# Patient Record
Sex: FEMALE | Race: WHITE | NOT HISPANIC OR LATINO | Employment: OTHER | URBAN - METROPOLITAN AREA
[De-identification: names, ages, dates, MRNs, and addresses within clinical notes are randomized per-mention and may not be internally consistent; named-entity substitution may affect disease eponyms.]

---

## 2017-04-10 ENCOUNTER — TRANSCRIBE ORDERS (OUTPATIENT)
Dept: MAMMOGRAPHY | Facility: HOSPITAL | Age: 67
End: 2017-04-10

## 2017-04-10 DIAGNOSIS — Z12.31 VISIT FOR SCREENING MAMMOGRAM: Primary | ICD-10-CM

## 2017-05-09 ENCOUNTER — HOSPITAL ENCOUNTER (OUTPATIENT)
Dept: MAMMOGRAPHY | Facility: HOSPITAL | Age: 67
Discharge: HOME | End: 2017-05-09
Admitting: PHYSICIAN ASSISTANT

## 2017-05-09 DIAGNOSIS — Z12.31 VISIT FOR SCREENING MAMMOGRAM: ICD-10-CM

## 2017-05-09 PROCEDURE — G0202 SCR MAMMO BI INCL CAD: HCPCS

## 2017-05-09 PROCEDURE — 77063 BREAST TOMOSYNTHESIS BI: CPT

## 2017-05-09 PROCEDURE — 77063 BREAST TOMOSYNTHESIS BI: CPT | Performed by: RADIOLOGY

## 2017-05-09 PROCEDURE — G0202 SCR MAMMO BI INCL CAD: HCPCS | Performed by: RADIOLOGY

## 2018-06-25 ENCOUNTER — TRANSCRIBE ORDERS (OUTPATIENT)
Dept: ADMINISTRATIVE | Facility: HOSPITAL | Age: 68
End: 2018-06-25

## 2018-06-25 DIAGNOSIS — Z12.31 VISIT FOR SCREENING MAMMOGRAM: Primary | ICD-10-CM

## 2018-08-30 ENCOUNTER — APPOINTMENT (OUTPATIENT)
Dept: MAMMOGRAPHY | Facility: HOSPITAL | Age: 68
End: 2018-08-30

## 2018-09-04 RX ORDER — CLONAZEPAM 1 MG/1
1 TABLET ORAL 2 TIMES DAILY PRN
COMMUNITY
End: 2022-04-12 | Stop reason: SDUPTHER

## 2018-09-04 RX ORDER — BUPROPION HYDROCHLORIDE 200 MG/1
200 TABLET, EXTENDED RELEASE ORAL 2 TIMES DAILY
COMMUNITY
End: 2022-06-27

## 2018-09-13 ENCOUNTER — OFFICE VISIT (OUTPATIENT)
Dept: BARIATRICS/WEIGHT MGMT | Facility: CLINIC | Age: 68
End: 2018-09-13

## 2018-09-13 VITALS
HEIGHT: 60 IN | RESPIRATION RATE: 18 BRPM | BODY MASS INDEX: 23.27 KG/M2 | SYSTOLIC BLOOD PRESSURE: 130 MMHG | HEART RATE: 67 BPM | OXYGEN SATURATION: 99 % | TEMPERATURE: 99.1 F | DIASTOLIC BLOOD PRESSURE: 88 MMHG | WEIGHT: 118.51 LBS

## 2018-09-13 DIAGNOSIS — R53.83 FATIGUE, UNSPECIFIED TYPE: ICD-10-CM

## 2018-09-13 DIAGNOSIS — E55.9 HYPOVITAMINOSIS D: ICD-10-CM

## 2018-09-13 DIAGNOSIS — K90.9 INTESTINAL MALABSORPTION, UNSPECIFIED TYPE: ICD-10-CM

## 2018-09-13 DIAGNOSIS — R11.0 NAUSEA: ICD-10-CM

## 2018-09-13 DIAGNOSIS — Z13.21 MALNUTRITION SCREEN: ICD-10-CM

## 2018-09-13 DIAGNOSIS — Z13.0 SCREENING, IRON DEFICIENCY ANEMIA: ICD-10-CM

## 2018-09-13 DIAGNOSIS — R10.84 GENERALIZED ABDOMINAL PAIN: ICD-10-CM

## 2018-09-13 DIAGNOSIS — Z98.84 STATUS POST BARIATRIC SURGERY: Primary | ICD-10-CM

## 2018-09-13 PROCEDURE — 99204 OFFICE O/P NEW MOD 45 MIN: CPT | Performed by: PHYSICIAN ASSISTANT

## 2018-09-13 RX ORDER — OMEPRAZOLE 40 MG/1
40 CAPSULE, DELAYED RELEASE ORAL DAILY
Qty: 30 CAPSULE | Refills: 0 | Status: SHIPPED | OUTPATIENT
Start: 2018-09-13 | End: 2018-10-10 | Stop reason: SDUPTHER

## 2018-09-13 RX ORDER — SUCRALFATE ORAL 1 G/10ML
1 SUSPENSION ORAL 4 TIMES DAILY
Qty: 1200 ML | Refills: 0 | Status: SHIPPED | OUTPATIENT
Start: 2018-09-13 | End: 2018-10-10 | Stop reason: SDUPTHER

## 2018-09-13 RX ORDER — GLUCOSAMINE/D3/BOSWELLIA SERRA 1500MG-400
TABLET ORAL
COMMUNITY
End: 2022-09-20

## 2018-09-13 RX ORDER — LANOLIN ALCOHOL/MO/W.PET/CERES
1000 CREAM (GRAM) TOPICAL DAILY
COMMUNITY
End: 2022-06-08 | Stop reason: SDUPTHER

## 2018-09-13 NOTE — PROGRESS NOTES
"[facility] Bariatric Surgery  [address]  [facility] [address] [phone]        Patient Name: Skye De Oliveira.  YOB: 1950      Date of Visit: 9/13/2018      CC: ADRIENNE s/p 4.5 years s/p RNY with abd pain/ nausea    HPI:  Patient is a 68yo pleasant female s/p laparoscopic gastric bypass with a 120 cm Minerva limb/ HHR by Dr. Shan Booker at [facility] in [facility] 1/22/14. S/p lap elicia/ VLAD for partial SBO by Dr. Booker 2/17/2014 presents to transfer care.  Patient brought her cousin with her today as support who is taking notes and making sure she asks all the right questions.     Since initial surgery, had intractable nausea/ vomiting x several months requiring  5-6 readmissions for elecrolyte replacement/ fluids/ antiemetics. Had multiple UGI swallow studies during this time period, no further procedures after the VLAD. Eventually was able to tolerate small amounts of food after several months, though has continued to have abdominal pain, nausea since POD#1. States the bypass was the \"worst decision she has ever made, but now she is just trying to deal with it\".  Persistent abdominal pain and inability to eat well has led to worsened depression. She has been successful in weight loss with presurgery weight 197.6lb, lowest weight 115lb, ,minimal fluctuation- weight today 118lb.  Has not had any recent imaging. Last EGD was prior to bypass.  She states HTN/ HLD completely resolved with weight loss s/p bypass.     Symptoms include nonradiating epigastric abdominal pain post prandial with all intake, daily. Every 3-4 days feels like she can't get food down/ feels like food is building up in chest that requires vomiting.   BM every 3-4 days, large amounts of diarrhea. Feels lethargic most of the time- no energy. Craves ice chips.  Denies reflux, paresthesias, vision change, memory issues, hair thinning. Though her cousin thinks her memory " is poor.       Diet-wise, not able to tolerate any sweets. Eats very limited portions and is hungry immediately after eating. Has extreme restrictions with dense meats, typically avoids this so she is able to eat more. Will go through a large bag of BBQ chips in 2 days between meals. Does not track intake or protein.  Does not supplement with protein. Drinking 64oz+ daily. Feels that she has to drink to get food to go down. Drinking mostly snapple diet tea (6 daily).     Breakfast: grits  Lunch: ham and cheese  Dinner: chili  Snacks: chips, popcorn    Taking Vitamins faithfully: biotin 69378, folic acid  800, B12 1000, probiotic, caltrate-D 600, K 595mcg, B complex, MVI.  Golfs regularly as hobby and exercise. Non smoker, not exposed to second hand smoke. Taking alkaselzer 4-6 a day, no other antacids. Gets steroid injection or PO steroids for costochondritis every 3 months. Takes tylenol arthritis daily. Avoids NSAIDS.     She feels her other medical issues are related to gastric bypass.  She struggles with costochondritis of ribs, what is described as SI joint/ pelvic pain, and arthritis of hands.  Recently told she has osteoporosis. Also been dealing with a lot of stress. Daughter is in an abusive relationship and she is no longer allowed to see her grandchildren after trying to help her get out of the relationship. Son is a drug addict.  Not in a happy marriage. Depressed and doesn't get out of the house much unless it is to golf or spend time with her cousin. Her cousin is prompting most of this information from patient.     Presugery weight 197.6lb. Lowest weight was 115lb.       Past Medical History:   Diagnosis Date   • Anxiety    • Chest pain     noted during the night, went to ED once. Relieved with alkaseltzer after 15 minutes   • Costochondral chest pain    • Depression    • Osteoporosis    • S/P bariatric surgery      Past Surgical History:   Procedure Laterality Date   • ABDOMINOPLASTY  2007   •  AUGMENTATION MAMMAPLASTY Bilateral 2008   • BREAST EXCISIONAL BIOPSY Bilateral [id number]   • CHOLECYSTECTOMY  2014   • COLONOSCOPY  2017   • ENDOSCOPY  2014    hiatal hernia, stomach polyps, ulcers   • GASTRIC BYPASS  2014    Dr. Jhony Matthews   • HAMMER TOE REPAIR     • HYSTERECTOMY  1975    AGE 25   • KNEE SURGERY  2010   • OOPHORECTOMY Right     10 YRS LATER AT AGE 35   • TONSILLECTOMY  1974         Current Outpatient Prescriptions:   •  Biotin 63429 MCG tablet, Take  by mouth., Disp: , Rfl:   •  buPROPion SR (WELLBUTRIN SR) 200 MG 12 hr tablet, Take 200 mg by mouth 2 (Two) Times a Day., Disp: , Rfl:   •  clonazePAM (KlonoPIN) 1 MG tablet, Take 1 mg by mouth 2 (Two) Times a Day As Needed for Seizures., Disp: , Rfl:   •  FOLIC ACID PO, Take  by mouth., Disp: , Rfl:   •  Potassium 95 MG tablet, Take  by mouth., Disp: , Rfl:   •  Probiotic Product (PROBIOTIC-10 PO), Take  by mouth., Disp: , Rfl:   •  vitamin B-12 (CYANOCOBALAMIN) 1000 MCG tablet, Take 1,000 mcg by mouth Daily., Disp: , Rfl:   •  omeprazole (priLOSEC) 40 MG capsule, Take 1 capsule by mouth Daily., Disp: 30 capsule, Rfl: 0  •  sucralfate (CARAFATE) 1 GM/10ML suspension, Take 10 mL by mouth 4 (Four) Times a Day for 30 days., Disp: 1200 mL, Rfl: 0    Allergies   Allergen Reactions   • Penicillins Hives     Tolerates cephalospins well       Family History   Problem Relation Age of Onset   • Lung cancer Mother    • Lung cancer Father    • Breast cancer Neg Hx    • Ovarian cancer Neg Hx      Social History     Social History   • Marital status:      Spouse name: N/A   • Number of children: N/A   • Years of education: N/A     Occupational History   • Not on file.     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 Lives with .        Review of Systems   Constitutional: Negative.    HENT: Negative.    Eyes: Negative.    Respiratory: Negative.    Cardiovascular: Positive for chest pain (occ CP at night, relieved with renaldo seltzer).   Gastrointestinal: Positive for abdominal pain, diarrhea, nausea and vomiting. Negative for blood in stool and constipation.   Genitourinary: Negative.    Musculoskeletal: Positive for arthralgias.   Skin: Negative.    Neurological: Negative.    Psychiatric/Behavioral: The patient is nervous/anxious.         Depression  insomnia       /88 (BP Location: Left arm, Patient Position: Sitting, Cuff Size: Large Adult)   Pulse 67   Temp 99.1 °F (37.3 °C) (Temporal Artery )   Resp 18   Ht 152.4 cm (60\")   Wt 53.8 kg (118 lb 8.2 oz)   SpO2 99%   BMI 23.14 kg/m²     Physical Exam   Constitutional: She is oriented to person, place, and time. She appears well-developed and well-nourished.   HENT:   Head: Normocephalic and atraumatic.   Mouth/Throat: Oropharynx is clear and moist.   Eyes: EOM are normal.   Neck: Normal range of motion. Neck supple. No thyromegaly present.   Cardiovascular: Normal rate, regular rhythm and normal heart sounds.    Pulmonary/Chest: Effort normal and breath sounds normal. No respiratory distress. She has no wheezes.   Abdominal: Soft. Bowel sounds are normal. She exhibits no distension. There is tenderness (epigastric TTP).   Musculoskeletal: Normal range of motion.   Neurological: She is alert and oriented to person, place, and time.   Skin: Skin is warm and dry.   Psychiatric: She has a normal mood and affect. Her behavior is normal. Judgment and thought content normal.   Vitals reviewed.        Assessment:   ADRIENNE s/p 4.5 years s/p RNY     ICD-10-CM ICD-9-CM   1. Status post bariatric surgery Z98.84 V45.86   2. Fatigue, unspecified type R53.83 780.79   3. Hypovitaminosis D E55.9 268.9   4. Screening, iron " deficiency anemia Z13.0 V78.0   5. Malnutrition screen Z13.21 V77.2   6. Intestinal malabsorption, unspecified type K90.9 579.9   7. Nausea R11.0 787.02   8. Generalized abdominal pain R10.84 789.07         Plan:  Will obtain UGI + SBFT, likely followed by EGD.  Advised start omeprazole 40mg daily and carafate slurry QID. Will obtain routine bariatric labs + h pylori. Handout given for medications to avoid such as ASA products (renaldo seltzer)/ NSAIDS, tramadol/ steroids/ tobacco.  Advised protein 70-100g with dietary handout. Advised starting full vitamin regimen with handout of recommended vitamins. Will further discuss plan with Dr. Jurado pending results. Please call or RTC with any concerns.     Addendum:  PPI was increased to BID, add zantac PRN. B1 injections x 3 weeks sent for memory issues.     UGI SBFT 9/19/18 at [facility] IMPRESSION:  Upper GI series:  1. Status post gastric bypass x5 years. There was no evidence of  extraluminal contrast. No postoperative strictures are seen.  2. Mild gastroesophageal reflux to the level of the thoracic inlet     Small bowel follow-through: Small bowel series appeared within normal  Limits.    Will proceed with EGD for further evaluation.        Aminta Mcclellan PA-C     Addendum:   UGI 9/19/18 at [facility] IMPRESSION:  Upper GI series:  1. Status post gastric bypass x5 years. There was no evidence of  extraluminal contrast. No postoperative strictures are seen.  2. Mild gastroesophageal reflux to the level of the thoracic inlet   Small bowel follow-through: Small bowel series appeared within normal  Limits.    EGD with Dr. Jurado 10/15/18- unremarkable. Couple small non obstructing sutures at GJ. No ulcers, esophagitis or visible HH. Small 3cm pouch measuring 37-40cm from incisors.     CT abd pelvis at [facility] 10/19/18 IMPRESSION:  Mild wall thickening seen diffusely throughout the right  colon. Findings suggesting possibly a mild colitis.

## 2018-09-18 LAB
25(OH)D3+25(OH)D2 SERPL-MCNC: 38.5 NG/ML (ref 30–100)
A-TOCOPHEROL VIT E SERPL-MCNC: 14.2 MG/L (ref 9–29)
ALBUMIN SERPL-MCNC: 4.4 G/DL (ref 3.6–4.8)
ALBUMIN/GLOB SERPL: 2.4 {RATIO} (ref 1.2–2.2)
ALP SERPL-CCNC: 99 IU/L (ref 39–117)
ALT SERPL-CCNC: 20 IU/L (ref 0–32)
AST SERPL-CCNC: 26 IU/L (ref 0–40)
BASOPHILS # BLD AUTO: 0 X10E3/UL (ref 0–0.2)
BASOPHILS NFR BLD AUTO: 0 %
BILIRUB SERPL-MCNC: 0.3 MG/DL (ref 0–1.2)
BUN SERPL-MCNC: 9 MG/DL (ref 8–27)
BUN/CREAT SERPL: 12 (ref 12–28)
CALCIUM SERPL-MCNC: 9.7 MG/DL (ref 8.7–10.3)
CHLORIDE SERPL-SCNC: 104 MMOL/L (ref 96–106)
CO2 SERPL-SCNC: 25 MMOL/L (ref 20–29)
CREAT SERPL-MCNC: 0.75 MG/DL (ref 0.57–1)
EOSINOPHIL # BLD AUTO: 0 X10E3/UL (ref 0–0.4)
EOSINOPHIL NFR BLD AUTO: 1 %
ERYTHROCYTE [DISTWIDTH] IN BLOOD BY AUTOMATED COUNT: 16 % (ref 12.3–15.4)
FOLATE SERPL-MCNC: 16.1 NG/ML
GAMMA TOCOPHEROL SERPL-MCNC: 1.5 MG/L (ref 0.5–4.9)
GLOBULIN SER CALC-MCNC: 1.8 G/DL (ref 1.5–4.5)
GLUCOSE SERPL-MCNC: 92 MG/DL (ref 65–99)
HCT VFR BLD AUTO: 39.4 % (ref 34–46.6)
HGB BLD-MCNC: 12.4 G/DL (ref 11.1–15.9)
IMM GRANULOCYTES # BLD: 0 X10E3/UL (ref 0–0.1)
IMM GRANULOCYTES NFR BLD: 0 %
IRON SERPL-MCNC: 62 UG/DL (ref 27–139)
LYMPHOCYTES # BLD AUTO: 1.8 X10E3/UL (ref 0.7–3.1)
LYMPHOCYTES NFR BLD AUTO: 37 %
Lab: NORMAL
MAGNESIUM SERPL-MCNC: 2.2 MG/DL (ref 1.6–2.3)
MCH RBC QN AUTO: 29.5 PG (ref 26.6–33)
MCHC RBC AUTO-ENTMCNC: 31.5 G/DL (ref 31.5–35.7)
MCV RBC AUTO: 94 FL (ref 79–97)
METHYLMALONATE SERPL-SCNC: 177 NMOL/L (ref 0–378)
MONOCYTES # BLD AUTO: 0.4 X10E3/UL (ref 0.1–0.9)
MONOCYTES NFR BLD AUTO: 8 %
NEUTROPHILS # BLD AUTO: 2.6 X10E3/UL (ref 1.4–7)
NEUTROPHILS NFR BLD AUTO: 54 %
PHOSPHATE SERPL-MCNC: 3.9 MG/DL (ref 2.5–4.5)
PLATELET # BLD AUTO: 284 X10E3/UL (ref 150–379)
POTASSIUM SERPL-SCNC: 5.8 MMOL/L (ref 3.5–5.2)
PREALB SERPL-MCNC: 22 MG/DL (ref 10–36)
PROT SERPL-MCNC: 6.2 G/DL (ref 6–8.5)
PTH-INTACT SERPL-MCNC: 21 PG/ML (ref 15–65)
RBC # BLD AUTO: 4.2 X10E6/UL (ref 3.77–5.28)
SODIUM SERPL-SCNC: 143 MMOL/L (ref 134–144)
TSH SERPL DL<=0.005 MIU/L-ACNC: 0.33 UIU/ML (ref 0.45–4.5)
UREA BREATH TEST QL: NEGATIVE
VIT A SERPL-MCNC: 39.3 UG/DL (ref 36.4–108)
VIT B1 BLD-SCNC: 131.8 NMOL/L (ref 66.5–200)
WBC # BLD AUTO: 4.9 X10E3/UL (ref 3.4–10.8)
ZINC SERPL-MCNC: 99 UG/DL (ref 56–134)

## 2018-09-19 ENCOUNTER — HOSPITAL ENCOUNTER (OUTPATIENT)
Dept: GENERAL RADIOLOGY | Facility: HOSPITAL | Age: 68
Discharge: HOME | End: 2018-09-19
Admitting: PHYSICIAN ASSISTANT

## 2018-09-19 DIAGNOSIS — R11.0 NAUSEA: ICD-10-CM

## 2018-09-19 DIAGNOSIS — R10.84 GENERALIZED ABDOMINAL PAIN: ICD-10-CM

## 2018-09-19 PROCEDURE — 74245: CPT

## 2018-09-19 PROCEDURE — A9270 NON-COVERED ITEM OR SERVICE: HCPCS | Performed by: PHYSICIAN ASSISTANT

## 2018-09-19 PROCEDURE — 63710000001 BARIUM SULFATE 96 % RECONSTITUTED SUSPENSION: Performed by: PHYSICIAN ASSISTANT

## 2018-09-19 RX ADMIN — BARIUM SULFATE 500 ML: 960 POWDER, FOR SUSPENSION ORAL at 13:00

## 2018-09-20 ENCOUNTER — TELEPHONE (OUTPATIENT)
Dept: BARIATRICS/WEIGHT MGMT | Facility: CLINIC | Age: 68
End: 2018-09-20

## 2018-09-20 DIAGNOSIS — R10.9 ABDOMINAL PAIN, UNSPECIFIED ABDOMINAL LOCATION: Primary | ICD-10-CM

## 2018-09-20 DIAGNOSIS — R13.10 DYSPHAGIA, UNSPECIFIED TYPE: ICD-10-CM

## 2018-09-20 NOTE — TELEPHONE ENCOUNTER
Notified pt that her vitamin levels looked ok, but her thyroid level was abnormal and her potassium was elevated and she needs to discuss this with her pcp.  I also let pt know that her UGI showed mild reflux otherwise ok.  I let pt know that Dr Jurado will provide more information when he reviews her records.  Told pt to increase her Omeprazole 40mg to bid and take zantac as needed instead of taking the renaldo seltzer and to continue taking the Carafate as prescribed.  Pt verbalized understanding.

## 2018-09-20 NOTE — TELEPHONE ENCOUNTER
Pt called in stating that she had her UGI done yesterday and done fine with that, but later in the day she started experiencing severe hear burn.  She stated she had to take a Shawna seltzer to help get rid of the heartburn, and she stated that she is not supposed to be taking this, but had no choice.  Pt stated he heart burn is getting worse, and not being able to really eat is getting worse and she was wondering if you have found out anything further to help her from her last appointment with you on 9/13/2018.  Please advise, thank you.

## 2018-09-24 RX ORDER — THIAMINE HYDROCHLORIDE 100 MG/ML
100 INJECTION, SOLUTION INTRAMUSCULAR; INTRAVENOUS DAILY
Qty: 21 ML | Refills: 0 | Status: SHIPPED | OUTPATIENT
Start: 2018-09-24 | End: 2022-06-08

## 2018-09-24 NOTE — TELEPHONE ENCOUNTER
Notified pt that Dr. Jurado has reviewed her UGI and wants to proceed with an EGD for further eval.  I let her know that Mauricio will be in contact with her to get her scheduled.  I also let pt know that we called in a 3 week rx for B1 injections into her pharmacy for her memory issues.  I sent in the rx.  Pt verbalized understanding.

## 2018-10-04 ENCOUNTER — HOSPITAL ENCOUNTER (OUTPATIENT)
Dept: MAMMOGRAPHY | Facility: HOSPITAL | Age: 68
Discharge: HOME | End: 2018-10-04
Admitting: PHYSICIAN ASSISTANT

## 2018-10-04 DIAGNOSIS — Z12.31 VISIT FOR SCREENING MAMMOGRAM: ICD-10-CM

## 2018-10-04 PROCEDURE — 77067 SCR MAMMO BI INCL CAD: CPT | Performed by: RADIOLOGY

## 2018-10-04 PROCEDURE — 77067 SCR MAMMO BI INCL CAD: CPT

## 2018-10-04 PROCEDURE — 77063 BREAST TOMOSYNTHESIS BI: CPT | Performed by: RADIOLOGY

## 2018-10-04 PROCEDURE — 77063 BREAST TOMOSYNTHESIS BI: CPT

## 2018-10-08 ENCOUNTER — TELEPHONE (OUTPATIENT)
Dept: BARIATRICS/WEIGHT MGMT | Facility: CLINIC | Age: 68
End: 2018-10-08

## 2018-10-08 NOTE — TELEPHONE ENCOUNTER
Pt called in requesting a refill on her carafate 1gm/10ml suspension and her Omeprazole 40mg to help her get through till she has her EGD when they can decide what they are going to do to help her with her nausea and vomiting. Pt saw Aminta.  Pt stated that the carafate and omeprazole has really helped her, and she is able to eat and drink now.

## 2018-10-10 RX ORDER — SUCRALFATE ORAL 1 G/10ML
1 SUSPENSION ORAL 4 TIMES DAILY
Qty: 3600 ML | Refills: 0 | Status: SHIPPED | OUTPATIENT
Start: 2018-10-10 | End: 2019-01-08

## 2018-10-10 RX ORDER — OMEPRAZOLE 40 MG/1
40 CAPSULE, DELAYED RELEASE ORAL 2 TIMES DAILY
Qty: 180 CAPSULE | Refills: 0 | Status: SHIPPED | OUTPATIENT
Start: 2018-10-10 | End: 2022-12-08

## 2018-10-10 NOTE — TELEPHONE ENCOUNTER
Notified pt that you are glad she is feeling better and to proceed with EGD as planned and her refills have been called into her pharmacy.  Pt verbalized understanding.

## 2018-10-11 ENCOUNTER — PREP FOR SURGERY (OUTPATIENT)
Dept: OTHER | Facility: HOSPITAL | Age: 68
End: 2018-10-11

## 2018-10-11 NOTE — H&P
"[facility] Bariatric Surgery  [address]  [facility] [address] [phone]           Patient Name: Skye De Oliveira.  YOB: 1950           CC: ADRIENNE s/p 4.5 years s/p RNY with abd pain/ nausea     HPI:  Patient is a 68yo pleasant female s/p laparoscopic gastric bypass with a 120 cm Minerva limb/ HHR by Dr. Shan Booker at [facility] in [facility] 1/22/14. S/p lap elicia/ VLAD for partial SBO by Dr. Booker 2/17/2014 presents to transfer care.  Patient brought her cousin with her today as support who is taking notes and making sure she asks all the right questions.      Since initial surgery, had intractable nausea/ vomiting x several months requiring  5-6 readmissions for elecrolyte replacement/ fluids/ antiemetics. Had multiple UGI swallow studies during this time period, no further procedures after the VLAD. Eventually was able to tolerate small amounts of food after several months, though has continued to have abdominal pain, nausea since POD#1. States the bypass was the \"worst decision she has ever made, but now she is just trying to deal with it\".  Persistent abdominal pain and inability to eat well has led to worsened depression. She has been successful in weight loss with presurgery weight 197.6lb, lowest weight 115lb, ,minimal fluctuation- weight today 118lb.  Has not had any recent imaging. Last EGD was prior to bypass.  She states HTN/ HLD completely resolved with weight loss s/p bypass.      Symptoms include nonradiating epigastric abdominal pain post prandial with all intake, daily. Every 3-4 days feels like she can't get food down/ feels like food is building up in chest that requires vomiting.   BM every 3-4 days, large amounts of diarrhea. Feels lethargic most of the time- no energy. Craves ice chips.  Denies reflux, paresthesias, vision change, memory issues, hair thinning. Though her cousin thinks her memory is poor.   "      Diet-wise, not able to tolerate any sweets. Eats very limited portions and is hungry immediately after eating. Has extreme restrictions with dense meats, typically avoids this so she is able to eat more. Will go through a large bag of BBQ chips in 2 days between meals. Does not track intake or protein.  Does not supplement with protein. Drinking 64oz+ daily. Feels that she has to drink to get food to go down. Drinking mostly snapple diet tea (6 daily).      Breakfast: grits  Lunch: ham and cheese  Dinner: chili  Snacks: chips, popcorn     Taking Vitamins faithfully: biotin 61311, folic acid  800, B12 1000, probiotic, caltrate-D 600, K 595mcg, B complex, MVI.  Golfs regularly as hobby and exercise. Non smoker, not exposed to second hand smoke. Taking alkaselzer 4-6 a day, no other antacids. Gets steroid injection or PO steroids for costochondritis every 3 months. Takes tylenol arthritis daily. Avoids NSAIDS.      She feels her other medical issues are related to gastric bypass.  She struggles with costochondritis of ribs, what is described as SI joint/ pelvic pain, and arthritis of hands.  Recently told she has osteoporosis. Also been dealing with a lot of stress. Daughter is in an abusive relationship and she is no longer allowed to see her grandchildren after trying to help her get out of the relationship. Son is a drug addict.  Not in a happy marriage. Depressed and doesn't get out of the house much unless it is to golf or spend time with her cousin. Her cousin is prompting most of this information from patient.      Presugery weight 197.6lb. Lowest weight was 115lb.        Past Medical History:   Diagnosis Date   • Anxiety    • Chest pain     noted during the night, went to ED once. Relieved with alkaseltzer after 15 minutes   • Costochondral chest pain    • Depression    • Osteoporosis    • S/P bariatric surgery      Past Surgical History:   Procedure Laterality Date   • ABDOMINOPLASTY  2007   •  AUGMENTATION MAMMAPLASTY Bilateral 2008   • BREAST EXCISIONAL BIOPSY Bilateral [id number]   • CHOLECYSTECTOMY  2014   • COLONOSCOPY  2017   • ENDOSCOPY  2014    hiatal hernia, stomach polyps, ulcers   • GASTRIC BYPASS  2014    Dr. Jhony Matthews   • HAMMER TOE REPAIR     • HYSTERECTOMY  1975    AGE 25   • KNEE SURGERY  2010   • OOPHORECTOMY Right     10 YRS LATER AT AGE 35   • TONSILLECTOMY  1974        Current Outpatient Prescriptions:   •  Biotin 97735 MCG tablet, Take  by mouth., Disp: , Rfl:   •  buPROPion SR (WELLBUTRIN SR) 200 MG 12 hr tablet, Take 200 mg by mouth 2 (Two) Times a Day., Disp: , Rfl:   •  clonazePAM (KlonoPIN) 1 MG tablet, Take 1 mg by mouth 2 (Two) Times a Day As Needed for Seizures., Disp: , Rfl:   •  FOLIC ACID PO, Take  by mouth., Disp: , Rfl:   •  Potassium 95 MG tablet, Take  by mouth., Disp: , Rfl:   •  Probiotic Product (PROBIOTIC-10 PO), Take  by mouth., Disp: , Rfl:   •  vitamin B-12 (CYANOCOBALAMIN) 1000 MCG tablet, Take 1,000 mcg by mouth Daily., Disp: , Rfl:   •  omeprazole (priLOSEC) 40 MG capsule, Take 1 capsule by mouth Daily., Disp: 30 capsule, Rfl: 0  •  sucralfate (CARAFATE) 1 GM/10ML suspension, Take 10 mL by mouth 4 (Four) Times a Day for 30 days., Disp: 1200 mL, Rfl: 0           Allergies   Allergen Reactions   • Penicillins Hives       Tolerates cephalospins well         Family History   Problem Relation Age of Onset   • Lung cancer Mother     • Lung cancer Father     • Breast cancer Neg Hx     • Ovarian cancer Neg Hx        Social History     Social History   • Marital status:      Spouse name: N/A   • Number of children: N/A   • Years of education: N/A     Occupational History   • Not on file.     Social History Main Topics   • Smoking status: Never Smoker   • Smokeless tobacco: Never Used   • Alcohol use No   • Drug use: No   • Sexual activity: Not on file      Comment: no hormones     Other Topics Concern   • Not on file     Social History  "Laurie    Works part time [place] [employer]- . Lives with .         Review of Systems   Constitutional: Negative.    HENT: Negative.    Eyes: Negative.    Respiratory: Negative.    Cardiovascular: Positive for chest pain (occ CP at night, relieved with renaldo seltzer).   Gastrointestinal: Positive for abdominal pain, diarrhea, nausea and vomiting. Negative for blood in stool and constipation.   Genitourinary: Negative.    Musculoskeletal: Positive for arthralgias.   Skin: Negative.    Neurological: Negative.    Psychiatric/Behavioral: The patient is nervous/anxious.         Depression, insomnia         /88 (BP Location: Left arm, Patient Position: Sitting, Cuff Size: Large Adult)   Pulse 67   Temp 99.1 °F (37.3 °C) (Temporal Artery )   Resp 18   Ht 152.4 cm (60\")   Wt 53.8 kg (118 lb 8.2 oz)   SpO2 99%   BMI 23.14 kg/m²      Physical Exam   Constitutional: She is oriented to person, place, and time. She appears well-developed and well-nourished.   HENT:   Head: Normocephalic and atraumatic.   Mouth/Throat: Oropharynx is clear and moist.   Eyes: EOM are normal.   Neck: Normal range of motion. Neck supple. No thyromegaly present.   Cardiovascular: Normal rate, regular rhythm and normal heart sounds.    Pulmonary/Chest: Effort normal and breath sounds normal. No respiratory distress. She has no wheezes.   Abdominal: Soft. Bowel sounds are normal. She exhibits no distension. There is tenderness (epigastric TTP).   Musculoskeletal: Normal range of motion.   Neurological: She is alert and oriented to person, place, and time.   Skin: Skin is warm and dry.   Psychiatric: She has a normal mood and affect. Her behavior is normal. Judgment and thought content normal.   Vitals reviewed.           Assessment:   ADRIENNE s/p 4.5 years s/p RNY       ICD-10-CM ICD-9-CM   1. Status post bariatric surgery Z98.84 V45.86   2. Fatigue, unspecified type R53.83 780.79   3. Hypovitaminosis D E55.9 " 268.9   4. Screening, iron deficiency anemia Z13.0 V78.0   5. Malnutrition screen Z13.21 V77.2   6. Intestinal malabsorption, unspecified type K90.9 579.9   7. Nausea R11.0 787.02   8. Generalized abdominal pain R10.84 789.07          Plan:  Will obtain UGI + SBFT, likely followed by EGD.  Advised start omeprazole 40mg daily and carafate slurry QID. Will obtain routine bariatric labs + h pylori. Handout given for medications to avoid such as ASA products (renaldo seltzer)/ NSAIDS, tramadol/ steroids/ tobacco.  Advised protein 70-100g with dietary handout. Advised starting full vitamin regimen with handout of recommended vitamins. Will further discuss plan with Dr. Jurado pending results. Please call or RTC with any concerns.      Addendum:  PPI was increased to BID, add zantac PRN. B1 injections x 3 weeks sent for memory issues.      UGI SBFT 9/19/18 at [facility] IMPRESSION:  1. Status post gastric bypass x5 years. There was no evidence of  extraluminal contrast. No postoperative strictures are seen.  2. Mild gastroesophageal reflux to the level of the thoracic inlet  3. Small bowel series appeared within normal limits.     Will proceed with EGD for further evaluation.

## 2018-10-15 ENCOUNTER — OUTSIDE FACILITY SERVICE (OUTPATIENT)
Dept: BARIATRICS/WEIGHT MGMT | Facility: CLINIC | Age: 68
End: 2018-10-15

## 2018-10-15 ENCOUNTER — LAB REQUISITION (OUTPATIENT)
Dept: LAB | Facility: HOSPITAL | Age: 68
End: 2018-10-15

## 2018-10-15 DIAGNOSIS — R10.9 ABDOMINAL PAIN: ICD-10-CM

## 2018-10-15 PROCEDURE — 43239 EGD BIOPSY SINGLE/MULTIPLE: CPT | Performed by: SURGERY

## 2018-10-15 PROCEDURE — 88305 TISSUE EXAM BY PATHOLOGIST: CPT | Performed by: SURGERY

## 2018-10-16 ENCOUNTER — TELEPHONE (OUTPATIENT)
Dept: BARIATRICS/WEIGHT MGMT | Facility: CLINIC | Age: 68
End: 2018-10-16

## 2018-10-16 DIAGNOSIS — R11.0 NAUSEA: ICD-10-CM

## 2018-10-16 DIAGNOSIS — R10.9 ABDOMINAL PAIN, UNSPECIFIED ABDOMINAL LOCATION: Primary | ICD-10-CM

## 2018-10-16 LAB
CYTO UR: NORMAL
LAB AP CASE REPORT: NORMAL
LAB AP CLINICAL INFORMATION: NORMAL
PATH REPORT.FINAL DX SPEC: NORMAL
PATH REPORT.GROSS SPEC: NORMAL

## 2018-10-16 NOTE — TELEPHONE ENCOUNTER
----- Message from Ton Jurado MD sent at 10/15/2018 10:39 AM EDT -----  EGD unrmk - order a CT scan abd/pelvis IV/po (barium based) contrast, ty

## 2018-10-16 NOTE — TELEPHONE ENCOUNTER
Notified pt that her EGD was reviewed and looked ok and Dr Jurado is wanting her to have a CT scan for further eval. Pt verbalized understanding.

## 2018-10-17 DIAGNOSIS — R10.9 ABDOMINAL PAIN, UNSPECIFIED ABDOMINAL LOCATION: ICD-10-CM

## 2018-10-17 DIAGNOSIS — R13.10 DYSPHAGIA, UNSPECIFIED TYPE: ICD-10-CM

## 2018-10-19 ENCOUNTER — HOSPITAL ENCOUNTER (OUTPATIENT)
Dept: CT IMAGING | Facility: HOSPITAL | Age: 68
Discharge: HOME | End: 2018-10-19
Admitting: PHYSICIAN ASSISTANT

## 2018-10-19 ENCOUNTER — APPOINTMENT (OUTPATIENT)
Dept: CT IMAGING | Facility: HOSPITAL | Age: 68
End: 2018-10-19

## 2018-10-19 DIAGNOSIS — R11.0 NAUSEA: ICD-10-CM

## 2018-10-19 DIAGNOSIS — R10.9 ABDOMINAL PAIN, UNSPECIFIED ABDOMINAL LOCATION: ICD-10-CM

## 2018-10-19 LAB — CREAT BLDA-MCNC: 0.8 MG/DL (ref 0.6–1.3)

## 2018-10-19 PROCEDURE — 82565 ASSAY OF CREATININE: CPT

## 2018-10-19 PROCEDURE — 74177 CT ABD & PELVIS W/CONTRAST: CPT

## 2018-10-19 PROCEDURE — A9270 NON-COVERED ITEM OR SERVICE: HCPCS | Performed by: SURGERY

## 2018-10-19 PROCEDURE — 63710000001 BARIUM 2 % SUSPENSION: Performed by: SURGERY

## 2018-10-19 PROCEDURE — 25010000002 IOPAMIDOL 61 % SOLUTION: Performed by: SURGERY

## 2018-10-19 RX ADMIN — IOPAMIDOL 95 ML: 612 INJECTION, SOLUTION INTRAVENOUS at 14:34

## 2018-10-19 RX ADMIN — BARIUM SULFATE 450 ML: 21 SUSPENSION ORAL at 13:25

## 2018-10-22 ENCOUNTER — TELEPHONE (OUTPATIENT)
Dept: BARIATRICS/WEIGHT MGMT | Facility: CLINIC | Age: 68
End: 2018-10-22

## 2018-10-22 NOTE — TELEPHONE ENCOUNTER
----- Message from Ton Jurado MD sent at 10/22/2018 10:03 AM EDT -----  Tell her all tests are normal.  The next step would be a diagnostic laparoscopy if she's interested.  No guarantee that it would alleviate her sx's.  Always welcome to get second opinion(s). Thanks,  ----- Message -----  From: Aminta Mcclellan PA-C  Sent: 10/22/2018   9:49 AM  To: Ton Jurado MD    Update- CT is resulted.   ADRIENNE- 4 years post bypass with nausea/ abdominal pain.  Let me know your thoughts, thanks!    UGI 9/19/18 at [facility] IMPRESSION:  Upper GI series:  1. Status post gastric bypass x5 years. There was no evidence of  extraluminal contrast. No postoperative strictures are seen.  2. Mild gastroesophageal reflux to the level of the thoracic inlet   Small bowel follow-through: Small bowel series appeared within normal  Limits.     EGD with Dr. Jurado 10/15/18- unremarkable. Couple small non obstructing sutures at GJ. No ulcers, esophagitis or visible HH. Small 3cm pouch measuring 37-40cm from incisors.      CT abd pelvis at [facility] 10/19/18 IMPRESSION:  Mild wall thickening seen diffusely throughout the right  colon. Findings suggesting possibly a mild colitis.    LOV- s/p laparoscopic gastric bypass with a 120 cm Minerva limb/ HHR by Dr. Shan Booker at [facility] in [facility] 1/22/14. S/p lap elicia/ VLAD for partial SBO by Dr. Booker 2/17/2014 presents to transfer care.  Patient brought her cousin with her today as support who is taking notes and making sure she asks all the right questions.      Since initial surgery, had intractable nausea/ vomiting x several months requiring  5-6 readmissions for elecrolyte replacement/ fluids/ antiemetics. Had multiple UGI swallow studies during this time period, no further procedures after the VLAD. Eventually was able to tolerate small amounts of food after several months, though has continued to have abdominal pain, nausea since POD#1. States the bypass was  "the \"worst decision she has ever made, but now she is just trying to deal with it\".  Persistent abdominal pain and inability to eat well has led to worsened depression. She has been successful in weight loss with presurgery weight 197.6lb, lowest weight 115lb, ,minimal fluctuation- weight today 118lb.  Has not had any recent imaging. Last EGD was prior to bypass.  She states HTN/ HLD completely resolved with weight loss s/p bypass.      Symptoms include nonradiating epigastric abdominal pain post prandial with all intake, daily. Every 3-4 days feels like she can't get food down/ feels like food is building up in chest that requires vomiting.   BM every 3-4 days, large amounts of diarrhea. Feels lethargic most of the time- no energy. Craves ice chips.  Denies reflux, paresthesias, vision change, memory issues, hair thinning. Though her cousin thinks her memory is poor.        Diet-wise, not able to tolerate any sweets. Eats very limited portions and is hungry immediately after eating. Has extreme restrictions with dense meats, typically avoids this so she is able to eat more. Will go through a large bag of BBQ chips in 2 days between meals. Does not track intake or protein.  Does not supplement with protein. Drinking 64oz+ daily. Feels that she has to drink to get food to go down. Drinking mostly snapple diet tea (6 daily).      Breakfast: grits  Lunch: ham and cheese  Dinner: chili  Snacks: chips, popcorn     Taking Vitamins faithfully: biotin 99122, folic acid  800, B12 1000, probiotic, caltrate-D 600, K 595mcg, B complex, MVI.  Golfs regularly as hobby and exercise. Non smoker, not exposed to second hand smoke. Taking alkaselzer 4-6 a day, no other antacids. Gets steroid injection or PO steroids for costochondritis every 3 months. Takes tylenol arthritis daily. Avoids NSAIDS.      She feels her other medical issues are related to gastric bypass.  She struggles with costochondritis of ribs, what is described as SI " joint/ pelvic pain, and arthritis of hands.  Recently told she has osteoporosis. Also been dealing with a lot of stress. Daughter is in an abusive relationship and she is no longer allowed to see her grandchildren after trying to help her get out of the relationship. Son is a drug addict.  Not in a happy marriage. Depressed and doesn't get out of the house much unless it is to golf or spend time with her cousin. Her cousin is prompting most of this information from patient.      Presugery weight 197.6lb. Lowest weight was 115lb.         ----- Message -----  From: Ton Jurado MD  Sent: 10/15/2018  10:39 AM  To: Aminta Mcclellan PA-C    EGD unrmk - order a CT scan abd/pelvis IV/po (barium based) contrast, ty

## 2018-10-22 NOTE — TELEPHONE ENCOUNTER
Notified pt that her CT scan showed mild wall thickening of her right colon suggesting possible colitis.  I let pt know that I will mail her the report so she can share the results with her pcp.  I let her know form a bariatric standpoint tests are normal.  I let pt know that the next step would be a diagnostic lap if interested.  The pt stated she was not interested in this at all.  I let her know that she needs to make a f/up appointment to discuss.  Pt is scheduling with Yecenia powell.

## 2018-10-23 ENCOUNTER — OFFICE VISIT (OUTPATIENT)
Dept: BARIATRICS/WEIGHT MGMT | Facility: CLINIC | Age: 68
End: 2018-10-23

## 2018-10-23 VITALS
HEART RATE: 73 BPM | OXYGEN SATURATION: 99 % | SYSTOLIC BLOOD PRESSURE: 122 MMHG | RESPIRATION RATE: 18 BRPM | WEIGHT: 118.5 LBS | HEIGHT: 60 IN | DIASTOLIC BLOOD PRESSURE: 70 MMHG | BODY MASS INDEX: 23.26 KG/M2 | TEMPERATURE: 97.8 F

## 2018-10-23 DIAGNOSIS — R10.13 DYSPEPSIA: Primary | ICD-10-CM

## 2018-10-23 PROCEDURE — 99214 OFFICE O/P EST MOD 30 MIN: CPT | Performed by: PHYSICIAN ASSISTANT

## 2019-04-09 RX ORDER — OMEPRAZOLE 40 MG/1
CAPSULE, DELAYED RELEASE ORAL
Qty: 180 CAPSULE | Refills: 0 | OUTPATIENT
Start: 2019-04-09

## 2020-01-09 ENCOUNTER — TRANSCRIBE ORDERS (OUTPATIENT)
Dept: ADMINISTRATIVE | Facility: HOSPITAL | Age: 70
End: 2020-01-09

## 2020-01-09 DIAGNOSIS — Z12.31 VISIT FOR SCREENING MAMMOGRAM: Primary | ICD-10-CM

## 2020-04-28 ENCOUNTER — APPOINTMENT (OUTPATIENT)
Dept: MAMMOGRAPHY | Facility: HOSPITAL | Age: 70
End: 2020-04-28

## 2020-06-06 ENCOUNTER — HOSPITAL ENCOUNTER (OUTPATIENT)
Dept: MAMMOGRAPHY | Facility: HOSPITAL | Age: 70
Discharge: HOME | End: 2020-06-06
Admitting: OBSTETRICS & GYNECOLOGY

## 2020-06-06 DIAGNOSIS — Z12.31 VISIT FOR SCREENING MAMMOGRAM: ICD-10-CM

## 2020-06-06 PROCEDURE — 77063 BREAST TOMOSYNTHESIS BI: CPT

## 2020-06-06 PROCEDURE — 77067 SCR MAMMO BI INCL CAD: CPT | Performed by: RADIOLOGY

## 2020-06-06 PROCEDURE — 77067 SCR MAMMO BI INCL CAD: CPT

## 2020-06-06 PROCEDURE — 77063 BREAST TOMOSYNTHESIS BI: CPT | Performed by: RADIOLOGY

## 2021-10-29 ENCOUNTER — TRANSCRIBE ORDERS (OUTPATIENT)
Dept: ADMINISTRATIVE | Facility: HOSPITAL | Age: 71
End: 2021-10-29

## 2021-10-29 DIAGNOSIS — Z12.31 VISIT FOR SCREENING MAMMOGRAM: Primary | ICD-10-CM

## 2021-12-04 ENCOUNTER — HOSPITAL ENCOUNTER (OUTPATIENT)
Dept: MAMMOGRAPHY | Facility: HOSPITAL | Age: 71
Discharge: HOME | End: 2021-12-04
Admitting: OBSTETRICS & GYNECOLOGY

## 2021-12-04 DIAGNOSIS — Z12.31 VISIT FOR SCREENING MAMMOGRAM: ICD-10-CM

## 2021-12-04 PROCEDURE — 77067 SCR MAMMO BI INCL CAD: CPT | Performed by: RADIOLOGY

## 2021-12-04 PROCEDURE — 77063 BREAST TOMOSYNTHESIS BI: CPT

## 2021-12-04 PROCEDURE — 77063 BREAST TOMOSYNTHESIS BI: CPT | Performed by: RADIOLOGY

## 2021-12-04 PROCEDURE — 77067 SCR MAMMO BI INCL CAD: CPT

## 2022-03-23 ENCOUNTER — TELEPHONE (OUTPATIENT)
Dept: FAMILY MEDICINE CLINIC | Facility: CLINIC | Age: 72
End: 2022-03-23

## 2022-03-23 NOTE — TELEPHONE ENCOUNTER
Patient is requesting medication refill for Klonopin 0.5mg to be sent to [facility] pharmacy. Patient states that she did her blood work on 3/18/22 and has a scheduled appointment with Dr. Garcia for a medication recheck on 4/12/22. She states that was the earliest appointment available. Patient will be out of town 3/31/22 - 4/9/22. Patient will run out at the end of the month.  She is requesting an earlier appointment or refill to be sent in before she leaves town.  Patient is available any time.  But on the 3/29 and 3/30 she can only do between 8am-11am. Patient would like a call back ph: [phone].

## 2022-03-24 NOTE — TELEPHONE ENCOUNTER
[facility] for her on 03/16/22 for 30 with 2 refills.  Pt informed and she will call us back if any trouble with the pharmacy.

## 2022-04-12 ENCOUNTER — OFFICE VISIT (OUTPATIENT)
Dept: FAMILY MEDICINE CLINIC | Facility: CLINIC | Age: 72
End: 2022-04-12

## 2022-04-12 VITALS
BODY MASS INDEX: 23.64 KG/M2 | DIASTOLIC BLOOD PRESSURE: 80 MMHG | SYSTOLIC BLOOD PRESSURE: 110 MMHG | TEMPERATURE: 98 F | OXYGEN SATURATION: 96 % | RESPIRATION RATE: 12 BRPM | HEART RATE: 76 BPM | WEIGHT: 120.4 LBS | HEIGHT: 60 IN

## 2022-04-12 DIAGNOSIS — E04.1 THYROID NODULE: ICD-10-CM

## 2022-04-12 DIAGNOSIS — E53.8 VITAMIN B12 DEFICIENCY: Primary | ICD-10-CM

## 2022-04-12 DIAGNOSIS — F51.01 PRIMARY INSOMNIA: ICD-10-CM

## 2022-04-12 DIAGNOSIS — F41.9 ANXIETY: ICD-10-CM

## 2022-04-12 PROBLEM — Z86.010 HISTORY OF ADENOMATOUS POLYP OF COLON: Status: ACTIVE | Noted: 2018-03-05

## 2022-04-12 PROBLEM — M15.9 PRIMARY OSTEOARTHRITIS INVOLVING MULTIPLE JOINTS: Status: ACTIVE | Noted: 2022-04-12

## 2022-04-12 PROBLEM — E55.9 VITAMIN D DEFICIENCY: Status: ACTIVE | Noted: 2021-06-28

## 2022-04-12 PROBLEM — M79.642 BILATERAL HAND PAIN: Status: ACTIVE | Noted: 2022-04-12

## 2022-04-12 PROBLEM — Z79.899 HIGH RISK MEDICATION USE: Status: ACTIVE | Noted: 2021-06-28

## 2022-04-12 PROBLEM — M81.0 SENILE OSTEOPOROSIS: Status: ACTIVE | Noted: 2022-04-12

## 2022-04-12 PROBLEM — K21.9 GASTROESOPHAGEAL REFLUX DISEASE WITHOUT ESOPHAGITIS: Status: ACTIVE | Noted: 2021-06-28

## 2022-04-12 PROBLEM — S52.501A CLOSED FRACTURE OF RIGHT DISTAL RADIUS: Status: ACTIVE | Noted: 2021-06-09

## 2022-04-12 PROBLEM — R29.6 RECURRENT FALLS: Status: ACTIVE | Noted: 2020-01-13

## 2022-04-12 PROBLEM — R41.3 MEMORY DEFICITS: Status: ACTIVE | Noted: 2020-01-13

## 2022-04-12 PROBLEM — M70.70 BURSITIS OF HIP: Status: ACTIVE | Noted: 2019-06-07

## 2022-04-12 PROBLEM — E87.6 HYPOKALEMIA: Status: ACTIVE | Noted: 2021-06-28

## 2022-04-12 PROBLEM — Z98.84 H/O GASTRIC BYPASS: Status: ACTIVE | Noted: 2017-07-13

## 2022-04-12 PROBLEM — R43.0 ANOSMIA: Status: ACTIVE | Noted: 2020-01-13

## 2022-04-12 PROBLEM — S76.019A TEAR OF GLUTEUS MEDIUS TENDON: Status: ACTIVE | Noted: 2020-03-03

## 2022-04-12 PROBLEM — F32.5 MAJOR DEPRESSION IN REMISSION: Status: ACTIVE | Noted: 2018-03-05

## 2022-04-12 PROBLEM — Z86.0101 HISTORY OF ADENOMATOUS POLYP OF COLON: Status: ACTIVE | Noted: 2018-03-05

## 2022-04-12 PROBLEM — M79.641 BILATERAL HAND PAIN: Status: ACTIVE | Noted: 2022-04-12

## 2022-04-12 PROBLEM — S92.101A: Status: ACTIVE | Noted: 2021-06-09

## 2022-04-12 PROBLEM — M15.0 PRIMARY OSTEOARTHRITIS INVOLVING MULTIPLE JOINTS: Status: ACTIVE | Noted: 2022-04-12

## 2022-04-12 PROBLEM — N18.2 CHRONIC KIDNEY DISEASE, STAGE 2 (MILD): Status: ACTIVE | Noted: 2018-03-05

## 2022-04-12 PROCEDURE — 99214 OFFICE O/P EST MOD 30 MIN: CPT | Performed by: FAMILY MEDICINE

## 2022-04-12 RX ORDER — DIPHENOXYLATE HYDROCHLORIDE AND ATROPINE SULFATE 2.5; .025 MG/1; MG/1
1 TABLET ORAL DAILY
COMMUNITY

## 2022-04-12 RX ORDER — RALOXIFENE HYDROCHLORIDE 60 MG/1
1 TABLET, FILM COATED ORAL DAILY
COMMUNITY
Start: 2021-11-12 | End: 2022-06-08

## 2022-04-12 RX ORDER — TOBRAMYCIN AND DEXAMETHASONE 3; 1 MG/ML; MG/ML
SUSPENSION/ DROPS OPHTHALMIC
COMMUNITY
Start: 2022-02-08 | End: 2022-06-08

## 2022-04-12 RX ORDER — CLONAZEPAM 0.5 MG/1
TABLET ORAL
COMMUNITY
Start: 2021-10-05 | End: 2022-04-12 | Stop reason: SDUPTHER

## 2022-04-12 RX ORDER — BENZONATATE 200 MG/1
CAPSULE ORAL
COMMUNITY
Start: 2022-01-19 | End: 2022-04-12

## 2022-04-12 RX ORDER — DENOSUMAB 60 MG/ML
1 INJECTION SUBCUTANEOUS
COMMUNITY
Start: 2021-11-09 | End: 2022-06-27

## 2022-04-12 RX ORDER — DEXTROMETHORPHAN HYDROBROMIDE AND PROMETHAZINE HYDROCHLORIDE 15; 6.25 MG/5ML; MG/5ML
SYRUP ORAL
COMMUNITY
Start: 2022-02-28 | End: 2022-06-08

## 2022-04-12 RX ORDER — TRAMADOL HYDROCHLORIDE 50 MG/1
TABLET ORAL
COMMUNITY
Start: 2022-03-16 | End: 2022-06-16 | Stop reason: SDUPTHER

## 2022-04-12 RX ORDER — BUPROPION HYDROCHLORIDE 150 MG/1
150 TABLET ORAL DAILY
COMMUNITY
End: 2022-04-12 | Stop reason: SDUPTHER

## 2022-04-12 RX ORDER — FERROUS SULFATE 325(65) MG
1 TABLET ORAL EVERY 12 HOURS
COMMUNITY
Start: 2021-10-05 | End: 2022-05-02

## 2022-04-12 RX ORDER — RALOXIFENE HYDROCHLORIDE 60 MG/1
TABLET, FILM COATED ORAL
COMMUNITY
Start: 2022-04-08 | End: 2022-04-12 | Stop reason: SDUPTHER

## 2022-04-12 NOTE — TELEPHONE ENCOUNTER
Rx Refill Note    Requested Prescriptions     Pending Prescriptions Disp Refills   • Cholecalciferol (vitamin D3) 125 MCG (5000 UT) tablet tablet [Pharmacy Med Name: VITAMIN D3 (CHOLECAL) 5000 IU TAB] 30 tablet      Sig: TAKE ONE TABLET BY MOUTH EVERY EVENING        Last office visit with prescribing clinician: 01/04/2022      Next office visit with prescribing clinician: 4/12/2022   Last labs: 01/04/2022  Last refill: 01/04/2022  Pharmacy [facility]

## 2022-04-12 NOTE — PROGRESS NOTES
Patient Name: Skye De Oliveira  : 1950   MRN: [id number]     Chief Complaint:    Chief Complaint   Patient presents with   • Anxiety     Pt here for medication refills       History of Present Illness: Skye De Oliveira is a 71 y.o. female who is here today for follow up on anxiety  HPI        Review of Systems:   Review of Systems   Constitutional: Negative.    HENT: Negative.    Eyes: Negative.    Respiratory: Negative.    Cardiovascular: Negative.    Gastrointestinal: Negative.    Neurological: Negative.         Past Medical History:   Past Medical History:   Diagnosis Date   • Adenomatous polyp of colon    • Age-related osteoporosis without current pathological fracture    • Anxiety    • Anxiety    • Anxiety    • Bowel obstruction (HCC)     W/ADHESIONS   • Chest pain     noted during the night, went to ED once. Relieved with alkaseltzer after 15 minutes   • Chronic kidney disease     STAGE 2   • Costochondral chest pain    • Depression    • DJD (degenerative joint disease)     MULTIPLE JOINTS   • GERD without esophagitis    • H/O mammogram 2016    [facility]   • High risk medication use    • Hypokalemia    • Major depression in remission (HCC)    • Osteoporosis    • Primary insomnia    • Primary insomnia    • Primary osteoarthritis involving multiple joints    • S/P bariatric surgery    • Senile osteoporosis    • Thyroid nodule     MULTIPLE   • Vitamin D deficiency        Past Surgical History:   Past Surgical History:   Procedure Laterality Date   • ABDOMINOPLASTY     • APPENDECTOMY     • AUGMENTATION MAMMAPLASTY Bilateral    • BLADDER REPAIR      [facility]   • BREAST EXCISIONAL BIOPSY Bilateral [id number]   • BREAST SURGERY      REMOVED IMPLANTS AND REMOVED MORE FIROIDS   • CATARACT EXTRACTION     • CHOLECYSTECTOMY     • COLONOSCOPY     • ENDOSCOPY  2014    hiatal hernia, stomach polyps, ulcers   • GASTRIC BYPASS      Dr. Jhony Matthews   • HAMMER TOE REPAIR      • HYSTERECTOMY  1975    AGE 25   • HYSTERECTOMY  1974    AND UILATERAL OOPHHORETOMY   • KNEE SURGERY  2010   • KNEE SURGERY Left    • MASTOPEXY Bilateral 1982    WITH IMPLANT RECONSTRUCTION   • OOPHORECTOMY Right     10 YRS LATER AT AGE 35   • OVARY SURGERY Right 1998    REMOVED   • SHOULDER SURGERY Bilateral     ROTATOR CUFF  RIGHT 2000 LEFT 2001   • STOMACH SURGERY      TUMMY TUCK   • TONSILLECTOMY  1974    ADENOIODECTOMY   • VOCAL CORD BIOPSY  1992    POLYPS REMOVED       Family History:   Family History   Problem Relation Age of Onset   • Lung cancer Mother    • Lung cancer Father 49   • Breast cancer Neg Hx    • Ovarian cancer Neg Hx    • Endometrial cancer Neg Hx        Social History:   Social History     Socioeconomic History   • Marital status:    Tobacco Use   • Smoking status: Never Smoker   • Smokeless tobacco: Never Used   Substance and Sexual Activity   • Alcohol use: No   • Drug use: No       Medications:     Current Outpatient Medications:   •  Biotin 62198 MCG tablet, Take  by mouth., Disp: , Rfl:   •  Cholecalciferol (vitamin D3) 125 MCG (5000 UT) tablet tablet, TAKE ONE TABLET BY MOUTH EVERY EVENING, Disp: 30 tablet, Rfl: 5  •  clonazePAM (KlonoPIN) 0.5 MG tablet, take 1 tablet by oral route  every NIGHT, Disp: , Rfl:   •  ferrous sulfate 325 (65 FE) MG tablet, Take 1 tablet by mouth Every 12 (Twelve) Hours., Disp: , Rfl:   •  FOLIC ACID PO, Take  by mouth., Disp: , Rfl:   •  multivitamin (THERAGRAN) tablet tablet, Take 1 tablet by mouth Daily., Disp: , Rfl:   •  omeprazole (priLOSEC) 40 MG capsule, Take 1 capsule by mouth 2 (Two) Times a Day., Disp: 180 capsule, Rfl: 0  •  Potassium 95 MG tablet, Take  by mouth., Disp: , Rfl:   •  Probiotic Product (PROBIOTIC-10 PO), Take  by mouth., Disp: , Rfl:   •  thiamine (B-1) 100 MG/ML injection, Inject 1 mL into the appropriate muscle as directed by prescriber Daily. Please provide pt needles and syringes x21 days.  Thank you., Disp: 21 mL, Rfl:  "0  •  traMADol (ULTRAM) 50 MG tablet, , Disp: , Rfl:   •  buPROPion SR (WELLBUTRIN SR) 200 MG 12 hr tablet, Take 200 mg by mouth 2 (Two) Times a Day., Disp: , Rfl:   •  clonazePAM (KlonoPIN) 1 MG tablet, Take 1 mg by mouth 2 (Two) Times a Day As Needed for Seizures., Disp: , Rfl:   •  denosumab (Prolia) 60 MG/ML solution prefilled syringe syringe, Inject 1 mL under the skin into the appropriate area as directed Every 6 (Six) Months., Disp: , Rfl:   •  promethazine-dextromethorphan (PROMETHAZINE-DM) 6.25-15 MG/5ML syrup, , Disp: , Rfl:   •  raloxifene (EVISTA) 60 MG tablet, Take 1 tablet by mouth Daily., Disp: , Rfl:   •  tobramycin-dexamethasone (TOBRADEX) 0.3-0.1 % ophthalmic suspension, , Disp: , Rfl:   •  vitamin B-12 (CYANOCOBALAMIN) 1000 MCG tablet, Take 1,000 mcg by mouth Daily., Disp: , Rfl:     Allergies:   Allergies   Allergen Reactions   • Penicillins Hives and Unknown - High Severity     Tolerates cephalospins well  Tolerates cephalospins well           Physical Exam:  Vital Signs:   Vitals:    04/12/22 0930   BP: 110/80   BP Location: Left arm   Patient Position: Sitting   Cuff Size: Adult   Pulse: 76   Resp: 12   Temp: 98 °F (36.7 °C)   SpO2: 96%   Weight: 54.6 kg (120 lb 6.4 oz)   Height: 152.4 cm (60\")   PainSc: 0-No pain     Body mass index is 23.51 kg/m².     Physical Exam  Vitals and nursing note reviewed.   Constitutional:       Appearance: Normal appearance. She is normal weight.   HENT:      Head: Normocephalic and atraumatic.      Right Ear: Tympanic membrane, ear canal and external ear normal.      Left Ear: Tympanic membrane, ear canal and external ear normal.      Nose: Nose normal.      Mouth/Throat:      Mouth: Mucous membranes are dry.      Pharynx: Oropharynx is clear.   Eyes:      Extraocular Movements: Extraocular movements intact.      Conjunctiva/sclera: Conjunctivae normal.      Pupils: Pupils are equal, round, and reactive to light.   Cardiovascular:      Rate and Rhythm: Normal " rate and regular rhythm.      Pulses: Normal pulses.      Heart sounds: Normal heart sounds.   Pulmonary:      Effort: Pulmonary effort is normal.      Breath sounds: Normal breath sounds.   Musculoskeletal:      Cervical back: Normal range of motion and neck supple.   Feet:      Comments:      Neurological:      Mental Status: She is alert.         Procedures      Assessment/Plan:   Diagnoses and all orders for this visit:    1. Vitamin B12 deficiency (Primary)  Assessment & Plan:  B12 is good.  We will continue to hold off on injections.  May restart in 6 months.    Orders:  -     Vitamin B12; Future  -     Comprehensive Metabolic Panel; Future  -     TSH Rfx On Abnormal To Free T4; Future  -     CBC & Differential; Future    2. Thyroid nodule  Assessment & Plan:  Last thyroid ultrasound was stable with no need for follow-up.    Orders:  -     Vitamin B12; Future  -     Comprehensive Metabolic Panel; Future  -     TSH Rfx On Abnormal To Free T4; Future  -     CBC & Differential; Future    3. Primary insomnia  -     Vitamin B12; Future  -     Comprehensive Metabolic Panel; Future  -     TSH Rfx On Abnormal To Free T4; Future  -     CBC & Differential; Future    4. Anxiety  Assessment & Plan:  Patient has done well on Klonopin.  We will continue for now.    Orders:  -     Vitamin B12; Future  -     Comprehensive Metabolic Panel; Future  -     TSH Rfx On Abnormal To Free T4; Future  -     CBC & Differential; Future           Follow Up:   No follow-ups on file.    Markus Garcia MD  [facility]

## 2022-04-12 NOTE — ASSESSMENT & PLAN NOTE
Patient has continued to have pain in her hands.  She had an x-ray in 2018 showing arthritis of both hands and an anti-CCP that was negative.  She may end up going to the [facility].

## 2022-04-14 ENCOUNTER — TELEPHONE (OUTPATIENT)
Dept: FAMILY MEDICINE CLINIC | Facility: CLINIC | Age: 72
End: 2022-04-14

## 2022-04-14 DIAGNOSIS — M79.642 BILATERAL HAND PAIN: Primary | ICD-10-CM

## 2022-04-14 DIAGNOSIS — M79.641 BILATERAL HAND PAIN: Primary | ICD-10-CM

## 2022-04-14 NOTE — TELEPHONE ENCOUNTER
Call pt and ask her if it is only her hands that hurt? How long and what has she taken to relieve pain?

## 2022-04-15 NOTE — TELEPHONE ENCOUNTER
Call pt and ask her if it is only her hands that hurt? How long and what has she taken to relieve pain?     Pt states yes only her hands, they have been really bad for 6 months, said you have tried meloxicam, celebrex, and now is on tramadol but it is not helping with the pain.

## 2022-05-02 RX ORDER — FERROUS SULFATE 325(65) MG
TABLET ORAL
Qty: 180 TABLET | Refills: 0 | Status: SHIPPED | OUTPATIENT
Start: 2022-05-02 | End: 2022-08-08

## 2022-05-02 NOTE — TELEPHONE ENCOUNTER
Rx Refill Note    Requested Prescriptions     Pending Prescriptions Disp Refills   • FeroSul 325 (65 Fe) MG tablet [Pharmacy Med Name: FEROSUL 325 MG TABLET] 180 tablet 0     Sig: TAKE ONE TABLET BY MOUTH TWICE A DAY        Last office visit with prescribing clinician: 4/12/2022      Next office visit with prescribing clinician: Visit date not found   Last labs:   Last refill: 01/04/22   Pharmacy (be sure to add in Epic). correct

## 2022-06-06 DIAGNOSIS — Z11.59 NEED FOR HEPATITIS C SCREENING TEST: Primary | ICD-10-CM

## 2022-06-07 ENCOUNTER — LAB (OUTPATIENT)
Dept: FAMILY MEDICINE CLINIC | Facility: CLINIC | Age: 72
End: 2022-06-07

## 2022-06-07 DIAGNOSIS — F41.9 ANXIETY: ICD-10-CM

## 2022-06-07 DIAGNOSIS — E04.1 THYROID NODULE: ICD-10-CM

## 2022-06-07 DIAGNOSIS — Z11.59 NEED FOR HEPATITIS C SCREENING TEST: ICD-10-CM

## 2022-06-07 DIAGNOSIS — F51.01 PRIMARY INSOMNIA: ICD-10-CM

## 2022-06-07 DIAGNOSIS — E53.8 VITAMIN B12 DEFICIENCY: ICD-10-CM

## 2022-06-07 PROCEDURE — 36415 COLL VENOUS BLD VENIPUNCTURE: CPT | Performed by: FAMILY MEDICINE

## 2022-06-08 ENCOUNTER — TELEPHONE (OUTPATIENT)
Dept: FAMILY MEDICINE CLINIC | Facility: CLINIC | Age: 72
End: 2022-06-08

## 2022-06-08 ENCOUNTER — OFFICE VISIT (OUTPATIENT)
Dept: FAMILY MEDICINE CLINIC | Facility: CLINIC | Age: 72
End: 2022-06-08

## 2022-06-08 VITALS
WEIGHT: 117.3 LBS | HEART RATE: 83 BPM | DIASTOLIC BLOOD PRESSURE: 74 MMHG | SYSTOLIC BLOOD PRESSURE: 118 MMHG | HEIGHT: 61 IN | OXYGEN SATURATION: 98 % | BODY MASS INDEX: 22.15 KG/M2

## 2022-06-08 DIAGNOSIS — R59.1 LYMPHADENOPATHY: Primary | ICD-10-CM

## 2022-06-08 PROBLEM — R07.9 CHEST PAIN: Status: ACTIVE | Noted: 2021-06-28

## 2022-06-08 PROBLEM — F32.A DEPRESSION: Status: ACTIVE | Noted: 2021-06-28

## 2022-06-08 PROBLEM — Z98.84 S/P BARIATRIC SURGERY: Status: ACTIVE | Noted: 2021-06-28

## 2022-06-08 PROBLEM — M81.0 OSTEOPOROSIS: Status: ACTIVE | Noted: 2021-06-28

## 2022-06-08 PROBLEM — R07.89 COSTOCHONDRAL CHEST PAIN: Status: ACTIVE | Noted: 2021-06-28

## 2022-06-08 PROBLEM — F41.9 ANXIETY: Status: ACTIVE | Noted: 2018-03-05

## 2022-06-08 LAB
ALBUMIN SERPL-MCNC: 4.3 G/DL (ref 3.8–4.8)
ALBUMIN/GLOB SERPL: 2.9 {RATIO} (ref 1.2–2.2)
ALP SERPL-CCNC: 97 IU/L (ref 44–121)
ALT SERPL-CCNC: 19 IU/L (ref 0–32)
AST SERPL-CCNC: 20 IU/L (ref 0–40)
BASOPHILS # BLD AUTO: 0 X10E3/UL (ref 0–0.2)
BASOPHILS NFR BLD AUTO: 0 %
BILIRUB SERPL-MCNC: 0.4 MG/DL (ref 0–1.2)
BUN SERPL-MCNC: 8 MG/DL (ref 8–27)
BUN/CREAT SERPL: 13 (ref 12–28)
CALCIUM SERPL-MCNC: 9.1 MG/DL (ref 8.7–10.3)
CHLORIDE SERPL-SCNC: 103 MMOL/L (ref 96–106)
CO2 SERPL-SCNC: 27 MMOL/L (ref 20–29)
CREAT SERPL-MCNC: 0.62 MG/DL (ref 0.57–1)
EGFRCR SERPLBLD CKD-EPI 2021: 96 ML/MIN/1.73
EOSINOPHIL # BLD AUTO: 0.1 X10E3/UL (ref 0–0.4)
EOSINOPHIL NFR BLD AUTO: 1 %
ERYTHROCYTE [DISTWIDTH] IN BLOOD BY AUTOMATED COUNT: 11.9 % (ref 11.7–15.4)
GLOBULIN SER CALC-MCNC: 1.5 G/DL (ref 1.5–4.5)
GLUCOSE SERPL-MCNC: 86 MG/DL (ref 65–99)
HCT VFR BLD AUTO: 42.5 % (ref 34–46.6)
HCV AB S/CO SERPL IA: 0.1 S/CO RATIO (ref 0–0.9)
HGB BLD-MCNC: 13.7 G/DL (ref 11.1–15.9)
IMM GRANULOCYTES # BLD AUTO: 0 X10E3/UL (ref 0–0.1)
IMM GRANULOCYTES NFR BLD AUTO: 0 %
LYMPHOCYTES # BLD AUTO: 1.7 X10E3/UL (ref 0.7–3.1)
LYMPHOCYTES NFR BLD AUTO: 34 %
MCH RBC QN AUTO: 31.3 PG (ref 26.6–33)
MCHC RBC AUTO-ENTMCNC: 32.2 G/DL (ref 31.5–35.7)
MCV RBC AUTO: 97 FL (ref 79–97)
MONOCYTES # BLD AUTO: 0.4 X10E3/UL (ref 0.1–0.9)
MONOCYTES NFR BLD AUTO: 7 %
NEUTROPHILS # BLD AUTO: 2.8 X10E3/UL (ref 1.4–7)
NEUTROPHILS NFR BLD AUTO: 58 %
PLATELET # BLD AUTO: 271 X10E3/UL (ref 150–450)
POTASSIUM SERPL-SCNC: 4.7 MMOL/L (ref 3.5–5.2)
PROT SERPL-MCNC: 5.8 G/DL (ref 6–8.5)
RBC # BLD AUTO: 4.38 X10E6/UL (ref 3.77–5.28)
SODIUM SERPL-SCNC: 143 MMOL/L (ref 134–144)
TSH SERPL DL<=0.005 MIU/L-ACNC: 1.63 UIU/ML (ref 0.45–4.5)
VIT B12 SERPL-MCNC: 308 PG/ML (ref 232–1245)
WBC # BLD AUTO: 4.9 X10E3/UL (ref 3.4–10.8)

## 2022-06-08 PROCEDURE — 99213 OFFICE O/P EST LOW 20 MIN: CPT | Performed by: FAMILY MEDICINE

## 2022-06-08 RX ORDER — OMEPRAZOLE 40 MG/1
1 CAPSULE, DELAYED RELEASE ORAL
COMMUNITY
Start: 2022-01-04 | End: 2022-06-08

## 2022-06-08 RX ORDER — TRAMADOL HYDROCHLORIDE 50 MG/1
TABLET ORAL
COMMUNITY
End: 2022-06-08

## 2022-06-08 RX ORDER — ACETAMINOPHEN 160 MG
TABLET,DISINTEGRATING ORAL EVERY 24 HOURS
COMMUNITY
End: 2022-06-08

## 2022-06-08 RX ORDER — RALOXIFENE HYDROCHLORIDE 60 MG/1
TABLET, FILM COATED ORAL EVERY 24 HOURS
COMMUNITY
End: 2022-06-08

## 2022-06-08 RX ORDER — CLONAZEPAM 0.5 MG/1
TABLET ORAL
COMMUNITY
End: 2022-06-16 | Stop reason: SDUPTHER

## 2022-06-08 RX ORDER — DIAZEPAM 5 MG/1
TABLET ORAL
COMMUNITY
Start: 2022-05-03 | End: 2022-06-08

## 2022-06-08 RX ORDER — DEXTROMETHORPHAN HYDROBROMIDE AND PROMETHAZINE HYDROCHLORIDE 15; 6.25 MG/5ML; MG/5ML
5 SYRUP ORAL EVERY 6 HOURS
COMMUNITY
Start: 2022-02-04 | End: 2022-06-08

## 2022-06-08 RX ORDER — OMEPRAZOLE 40 MG/1
CAPSULE, DELAYED RELEASE ORAL EVERY 24 HOURS
COMMUNITY
End: 2022-06-08

## 2022-06-08 RX ORDER — CLONAZEPAM 0.5 MG/1
TABLET ORAL
COMMUNITY
Start: 2022-05-29 | End: 2022-06-08

## 2022-06-08 NOTE — TELEPHONE ENCOUNTER
Patient called to check on the status of her rheumatology appt.  She has been waiting since April.

## 2022-06-09 LAB
CRP SERPL-MCNC: <1 MG/L (ref 0–10)
ERYTHROCYTE [SEDIMENTATION RATE] IN BLOOD BY WESTERGREN METHOD: 2 MM/HR (ref 0–40)

## 2022-06-10 ENCOUNTER — TELEPHONE (OUTPATIENT)
Dept: FAMILY MEDICINE CLINIC | Facility: CLINIC | Age: 72
End: 2022-06-10

## 2022-06-10 NOTE — TELEPHONE ENCOUNTER
Patient called for an update on her referral for an ultrasound for her lymph nodes.  She was informed that it is still pending.  She is concerned because her lymph nodes are swollen and because of the blood work she had done.  She does not want to wait too long for an appointment. She wanted a message sent to Dr. Auguste. Ph: [phone]

## 2022-06-10 NOTE — TELEPHONE ENCOUNTER
Can somebody check on the status of her ultrasound and see when we are able to get this scheduled for her

## 2022-06-16 ENCOUNTER — OFFICE VISIT (OUTPATIENT)
Dept: FAMILY MEDICINE CLINIC | Facility: CLINIC | Age: 72
End: 2022-06-16

## 2022-06-16 ENCOUNTER — TELEPHONE (OUTPATIENT)
Dept: FAMILY MEDICINE CLINIC | Facility: CLINIC | Age: 72
End: 2022-06-16

## 2022-06-16 VITALS
WEIGHT: 115.5 LBS | SYSTOLIC BLOOD PRESSURE: 110 MMHG | DIASTOLIC BLOOD PRESSURE: 60 MMHG | BODY MASS INDEX: 21.81 KG/M2 | HEIGHT: 61 IN | HEART RATE: 73 BPM | RESPIRATION RATE: 12 BRPM | TEMPERATURE: 97.7 F | OXYGEN SATURATION: 99 %

## 2022-06-16 DIAGNOSIS — N18.31 CHRONIC KIDNEY DISEASE, STAGE 3A: ICD-10-CM

## 2022-06-16 DIAGNOSIS — F41.9 ANXIETY: ICD-10-CM

## 2022-06-16 DIAGNOSIS — R10.11 RIGHT UPPER QUADRANT ABDOMINAL PAIN: Primary | ICD-10-CM

## 2022-06-16 DIAGNOSIS — Z79.899 ENCOUNTER FOR LONG-TERM (CURRENT) USE OF OTHER MEDICATIONS: ICD-10-CM

## 2022-06-16 LAB
POC AMPHETAMINES: NEGATIVE
POC BARBITURATES: NEGATIVE
POC BENZODIAZEPHINES: POSITIVE
POC COCAINE: NEGATIVE
POC METHADONE: NEGATIVE
POC METHAMPHETAMINE SCREEN URINE: NEGATIVE
POC OPIATES: NEGATIVE
POC OXYCODONE: NEGATIVE
POC PHENCYCLIDINE: NEGATIVE
POC PROPOXYPHENE: NEGATIVE
POC THC: NEGATIVE
POC TRICYCLIC ANTIDEPRESSANTS: NEGATIVE

## 2022-06-16 PROCEDURE — 99214 OFFICE O/P EST MOD 30 MIN: CPT | Performed by: FAMILY MEDICINE

## 2022-06-16 PROCEDURE — 80305 DRUG TEST PRSMV DIR OPT OBS: CPT | Performed by: FAMILY MEDICINE

## 2022-06-16 RX ORDER — TRAMADOL HYDROCHLORIDE 50 MG/1
50 TABLET ORAL DAILY
Qty: 30 TABLET | Refills: 2 | Status: SHIPPED | OUTPATIENT
Start: 2022-06-16

## 2022-06-16 RX ORDER — CLONAZEPAM 0.5 MG/1
0.5 TABLET ORAL NIGHTLY PRN
Qty: 30 TABLET | Refills: 2 | Status: SHIPPED | OUTPATIENT
Start: 2022-06-16 | End: 2022-09-14 | Stop reason: SDUPTHER

## 2022-06-16 NOTE — TELEPHONE ENCOUNTER
THIS REQUEST WAS FA[facility]ED TO [facility] ON 4/29/22. I SPOKE TO DION TODAY, SHE DID NOT HAVE IT EVEN THOUGH IT WAS FAXED TO THE CORRECT NUMBER. I CONFIRMED THE FAX AND REFAXED IT, AND ASKED HER TO LOOK FOR IT TO GET PATIENT SCHEDULED.

## 2022-06-16 NOTE — PROGRESS NOTES
Patient Name: Skye De Oliveira  : 1950   MRN: [id number]     Chief Complaint:    Chief Complaint   Patient presents with   • lab results     Pt here for lab results   • Anxiety   • lump under arm pit     Pt has a lump under her left arm pit that is very sore x 2 weeks, has appt for U/S today at 2pm [facility]       History of Present Illness: Skye De Oliveira is a 71 y.o. female who is here today for follow up on abdominal pain  HPI        Review of Systems:   Review of Systems   Constitutional: Negative.    HENT: Negative.    Eyes: Negative.    Respiratory: Negative.    Cardiovascular: Negative.    Gastrointestinal: Positive for abdominal distention and abdominal pain.   Neurological: Negative.         Past Medical History:   Past Medical History:   Diagnosis Date   • Adenomatous polyp of colon    • Age-related osteoporosis without current pathological fracture    • Anxiety    • Anxiety    • Anxiety    • Bowel obstruction (HCC)     W/ADHESIONS   • Chest pain     noted during the night, went to ED once. Relieved with alkaseltzer after 15 minutes   • Chronic kidney disease     STAGE 2   • Costochondral chest pain    • Depression    • DJD (degenerative joint disease)     MULTIPLE JOINTS   • GERD without esophagitis    • H/O mammogram 2016    [facility]   • High risk medication use    • Hypokalemia    • Major depression in remission (HCC)    • Osteoporosis    • Primary insomnia    • Primary insomnia    • Primary osteoarthritis involving multiple joints    • S/P bariatric surgery    • Senile osteoporosis    • Thyroid nodule     MULTIPLE   • Vitamin D deficiency        Past Surgical History:   Past Surgical History:   Procedure Laterality Date   • ABDOMINOPLASTY     • APPENDECTOMY     • AUGMENTATION MAMMAPLASTY Bilateral    • BLADDER REPAIR      [facility]   • BREAST EXCISIONAL BIOPSY Bilateral [id number]   • BREAST SURGERY      REMOVED IMPLANTS AND REMOVED MORE FIROIDS   • CATARACT EXTRACTION     •  CHOLECYSTECTOMY  2014   • COLONOSCOPY  2017   • ENDOSCOPY  2014    hiatal hernia, stomach polyps, ulcers   • GASTRIC BYPASS  2014    Dr. Jhony Matthews   • HAMMER TOE REPAIR     • HYSTERECTOMY  1975    AGE 25   • HYSTERECTOMY  1974    AND UILATERAL OOPHHORETOMY   • KNEE SURGERY  2010   • KNEE SURGERY Left    • MASTOPEXY Bilateral 1982    WITH IMPLANT RECONSTRUCTION   • OOPHORECTOMY Right     10 YRS LATER AT AGE 35   • OVARY SURGERY Right 1998    REMOVED   • SHOULDER SURGERY Bilateral     ROTATOR CUFF  RIGHT 2000 LEFT 2001   • STOMACH SURGERY      TUMMY TUCK   • TONSILLECTOMY  1974    ADENOIODECTOMY   • VOCAL CORD BIOPSY  1992    POLYPS REMOVED       Family History:   Family History   Problem Relation Age of Onset   • Lung cancer Mother    • Lung cancer Father 49   • Breast cancer Neg Hx    • Ovarian cancer Neg Hx    • Endometrial cancer Neg Hx        Social History:   Social History     Socioeconomic History   • Marital status:    Tobacco Use   • Smoking status: Never Smoker   • Smokeless tobacco: Never Used   Substance and Sexual Activity   • Alcohol use: No   • Drug use: No   • Sexual activity: Not Currently     Partners: Male     Comment: no hormones       Medications:     Current Outpatient Medications:   •  Biotin 30061 MCG tablet, Take  by mouth., Disp: , Rfl:   •  buPROPion SR (WELLBUTRIN SR) 200 MG 12 hr tablet, Take 200 mg by mouth 2 (Two) Times a Day., Disp: , Rfl:   •  Cholecalciferol (vitamin D3) 125 MCG (5000 UT) tablet tablet, TAKE ONE TABLET BY MOUTH EVERY EVENING, Disp: 30 tablet, Rfl: 5  •  clonazePAM (KlonoPIN) 0.5 MG tablet, 1 tab(s), Disp: , Rfl:   •  denosumab (Prolia) 60 MG/ML solution prefilled syringe syringe, Inject 1 mL under the skin into the appropriate area as directed Every 6 (Six) Months., Disp: , Rfl:   •  FeroSul 325 (65 Fe) MG tablet, TAKE ONE TABLET BY MOUTH TWICE A DAY, Disp: 180 tablet, Rfl: 0  •  FOLIC ACID PO, Take  by mouth., Disp: , Rfl:   •   "loratadine-pseudoephedrine (CLARITIN-D 12-hour) 5-120 MG per 12 hr tablet, Every 12 (Twelve) Hours., Disp: , Rfl:   •  multivitamin (THERAGRAN) tablet tablet, Take 1 tablet by mouth Daily., Disp: , Rfl:   •  omeprazole (priLOSEC) 40 MG capsule, Take 1 capsule by mouth 2 (Two) Times a Day., Disp: 180 capsule, Rfl: 0  •  Potassium 95 MG tablet, Take  by mouth., Disp: , Rfl:   •  Probiotic Product (PROBIOTIC-10 PO), Take  by mouth., Disp: , Rfl:   •  traMADol (ULTRAM) 50 MG tablet, , Disp: , Rfl:     Allergies:   Allergies   Allergen Reactions   • Penicillins Hives and Unknown - High Severity     Tolerates cephalospins well  Tolerates cephalospins well     • Penicillin V Rash         Physical Exam:  Vital Signs:   Vitals:    06/16/22 0830   BP: 110/60   BP Location: Left arm   Patient Position: Sitting   Cuff Size: Adult   Pulse: 73   Resp: 12   Temp: 97.7 °F (36.5 °C)   TempSrc: Temporal   SpO2: 99%   Weight: 52.4 kg (115 lb 8 oz)   Height: 154.9 cm (61\")   PainSc: 0-No pain     Body mass index is 21.82 kg/m².     Physical Exam  Vitals and nursing note reviewed.   Constitutional:       Appearance: Normal appearance. She is normal weight.   HENT:      Head: Normocephalic and atraumatic.      Right Ear: Tympanic membrane, ear canal and external ear normal.      Left Ear: Tympanic membrane, ear canal and external ear normal.      Nose: Nose normal.      Mouth/Throat:      Mouth: Mucous membranes are dry.      Pharynx: Oropharynx is clear.   Eyes:      Extraocular Movements: Extraocular movements intact.      Conjunctiva/sclera: Conjunctivae normal.      Pupils: Pupils are equal, round, and reactive to light.   Cardiovascular:      Rate and Rhythm: Normal rate and regular rhythm.      Pulses: Normal pulses.      Heart sounds: Normal heart sounds.   Pulmonary:      Effort: Pulmonary effort is normal.      Breath sounds: Normal breath sounds.   Abdominal:      Comments: Midepigastric abdominal tenderness.   Musculoskeletal: "      Cervical back: Normal range of motion and neck supple.   Feet:      Comments:      Neurological:      Mental Status: She is alert.         Procedures      Assessment/Plan:   Diagnoses and all orders for this visit:    1. Right upper quadrant abdominal pain (Primary)  Assessment & Plan:  Patient is tender in her midepigastrium.  She had very severe abdominal pain last week.  It woke her from sleep.  She did not have any associated symptoms with it.  She has never had this pain before.  Since she is really tender in the midepigastrium I feel that a CT would be warranted.  Her CBC and CMP were normal.  Recheck in 1 week.    Orders:  -     CT Abdomen With & Without Contrast; Future    2. Anxiety  Assessment & Plan:  Refill Klonopin.  We will follow.    Orders:  -     CT Abdomen With & Without Contrast; Future    3. Chronic kidney disease, stage 3a (HCC)  -     CT Abdomen With & Without Contrast; Future           Follow Up:   Return in about 1 week (around 6/23/2022).    Markus Garcia MD  [facility] [place] [facility]     Answers for HPI/ROS submitted by the patient on 6/9/2022  Please describe your symptoms.: Prescription refill  Have you had these symptoms before?: No  How long have you been having these symptoms?: 1-4 days  Please list any medications you are currently taking for this condition.: Already on file  What is the primary reason for your visit?: Other

## 2022-06-16 NOTE — ASSESSMENT & PLAN NOTE
Patient is tender in her midepigastrium.  She had very severe abdominal pain last week.  It woke her from sleep.  She did not have any associated symptoms with it.  She has never had this pain before.  Since she is really tender in the midepigastrium I feel that a CT would be warranted.  Her CBC and CMP were normal.  Recheck in 1 week.

## 2022-06-17 ENCOUNTER — TELEPHONE (OUTPATIENT)
Dept: FAMILY MEDICINE CLINIC | Facility: CLINIC | Age: 72
End: 2022-06-17

## 2022-06-17 DIAGNOSIS — R59.1 LYMPHADENOPATHY: ICD-10-CM

## 2022-06-24 DIAGNOSIS — R10.11 RIGHT UPPER QUADRANT ABDOMINAL PAIN: ICD-10-CM

## 2022-06-24 DIAGNOSIS — N18.31 CHRONIC KIDNEY DISEASE, STAGE 3A: ICD-10-CM

## 2022-06-24 DIAGNOSIS — Z79.899 ENCOUNTER FOR LONG-TERM (CURRENT) USE OF OTHER MEDICATIONS: ICD-10-CM

## 2022-06-24 DIAGNOSIS — F41.9 ANXIETY: ICD-10-CM

## 2022-06-27 ENCOUNTER — OFFICE VISIT (OUTPATIENT)
Dept: FAMILY MEDICINE CLINIC | Facility: CLINIC | Age: 72
End: 2022-06-27

## 2022-06-27 VITALS
BODY MASS INDEX: 22.28 KG/M2 | WEIGHT: 118 LBS | RESPIRATION RATE: 12 BRPM | SYSTOLIC BLOOD PRESSURE: 130 MMHG | HEIGHT: 61 IN | DIASTOLIC BLOOD PRESSURE: 80 MMHG | TEMPERATURE: 97.1 F | HEART RATE: 71 BPM | OXYGEN SATURATION: 97 %

## 2022-06-27 DIAGNOSIS — R10.11 RIGHT UPPER QUADRANT ABDOMINAL PAIN: Primary | ICD-10-CM

## 2022-06-27 DIAGNOSIS — M79.622 ARMPIT PAIN, LEFT: ICD-10-CM

## 2022-06-27 DIAGNOSIS — R63.4 LOSS OF WEIGHT: ICD-10-CM

## 2022-06-27 PROCEDURE — 99213 OFFICE O/P EST LOW 20 MIN: CPT | Performed by: FAMILY MEDICINE

## 2022-06-27 NOTE — PROGRESS NOTES
Patient Name: Skye De Oliveira  : 1950   MRN: [id number]     Chief Complaint:    Chief Complaint   Patient presents with   • Results     Pt here for CT of abd, and U/S results       History of Present Illness: Skye De Oliveira is a 71 y.o. female who is here today for follow up.  HPI        Review of Systems:   Review of Systems   Constitutional: Negative.  Negative for fever.   HENT: Negative.    Eyes: Negative.    Respiratory: Negative.    Cardiovascular: Negative.    Gastrointestinal: Positive for abdominal pain. Negative for constipation, diarrhea, nausea and vomiting.   Genitourinary: Negative for dysuria, frequency and hematuria.   Musculoskeletal: Negative for arthralgias and myalgias.   Neurological: Negative.  Negative for headaches.        Past Medical History:   Past Medical History:   Diagnosis Date   • Adenomatous polyp of colon    • Age-related osteoporosis without current pathological fracture    • Anxiety    • Anxiety    • Anxiety    • Bowel obstruction (HCC)     W/ADHESIONS   • Chest pain     noted during the night, went to ED once. Relieved with alkaseltzer after 15 minutes   • Chronic kidney disease     STAGE 2   • Costochondral chest pain    • Depression    • DJD (degenerative joint disease)     MULTIPLE JOINTS   • GERD without esophagitis    • H/O mammogram 2016    [facility]   • High risk medication use    • Hypokalemia    • Major depression in remission (HCC)    • Osteoporosis    • Primary insomnia    • Primary insomnia    • Primary osteoarthritis involving multiple joints    • S/P bariatric surgery    • Senile osteoporosis    • Thyroid nodule     MULTIPLE   • Vitamin D deficiency        Past Surgical History:   Past Surgical History:   Procedure Laterality Date   • ABDOMINOPLASTY     • APPENDECTOMY     • AUGMENTATION MAMMAPLASTY Bilateral    • BLADDER REPAIR      [facility] [place]   • BREAST EXCISIONAL BIOPSY Bilateral [id number]   • BREAST SURGERY      REMOVED IMPLANTS AND  REMOVED MORE FIROIDS   • CATARACT EXTRACTION     • CHOLECYSTECTOMY  2014   • COLONOSCOPY  2017   • ENDOSCOPY  2014    hiatal hernia, stomach polyps, ulcers   • GASTRIC BYPASS  2014    Dr. Jhony Matthews   • HAMMER TOE REPAIR     • HYSTERECTOMY  1975    AGE 25   • HYSTERECTOMY  1974    AND UILATERAL OOPHHORETOMY   • KNEE SURGERY  2010   • KNEE SURGERY Left    • MASTOPEXY Bilateral 1982    WITH IMPLANT RECONSTRUCTION   • OOPHORECTOMY Right     10 YRS LATER AT AGE 35   • OVARY SURGERY Right 1998    REMOVED   • SHOULDER SURGERY Bilateral     ROTATOR CUFF  RIGHT 2000 LEFT 2001   • STOMACH SURGERY      TUMMY TUCK   • TONSILLECTOMY  1974    ADENOIODECTOMY   • VOCAL CORD BIOPSY  1992    POLYPS REMOVED       Family History:   Family History   Problem Relation Age of Onset   • Lung cancer Mother    • Lung cancer Father 49   • Breast cancer Neg Hx    • Ovarian cancer Neg Hx    • Endometrial cancer Neg Hx        Social History:   Social History     Socioeconomic History   • Marital status:    Tobacco Use   • Smoking status: Never Smoker   • Smokeless tobacco: Never Used   Substance and Sexual Activity   • Alcohol use: No   • Drug use: No   • Sexual activity: Not Currently     Partners: Male     Comment: no hormones       Medications:     Current Outpatient Medications:   •  Biotin 25526 MCG tablet, Take  by mouth., Disp: , Rfl:   •  Cholecalciferol (vitamin D3) 125 MCG (5000 UT) tablet tablet, TAKE ONE TABLET BY MOUTH EVERY EVENING, Disp: 30 tablet, Rfl: 5  •  clonazePAM (KlonoPIN) 0.5 MG tablet, Take 1 tablet by mouth At Night As Needed for Anxiety., Disp: 30 tablet, Rfl: 2  •  FeroSul 325 (65 Fe) MG tablet, TAKE ONE TABLET BY MOUTH TWICE A DAY, Disp: 180 tablet, Rfl: 0  •  FOLIC ACID PO, Take  by mouth., Disp: , Rfl:   •  multivitamin (THERAGRAN) tablet tablet, Take 1 tablet by mouth Daily., Disp: , Rfl:   •  omeprazole (priLOSEC) 40 MG capsule, Take 1 capsule by mouth 2 (Two) Times a Day., Disp: 180  "capsule, Rfl: 0  •  Potassium 95 MG tablet, Take  by mouth., Disp: , Rfl:   •  Probiotic Product (PROBIOTIC-10 PO), Take  by mouth., Disp: , Rfl:   •  traMADol (ULTRAM) 50 MG tablet, Take 1 tablet by mouth Daily., Disp: 30 tablet, Rfl: 2  •  buPROPion SR (WELLBUTRIN SR) 200 MG 12 hr tablet, Take 200 mg by mouth 2 (Two) Times a Day., Disp: , Rfl:   •  denosumab (Prolia) 60 MG/ML solution prefilled syringe syringe, Inject 1 mL under the skin into the appropriate area as directed Every 6 (Six) Months., Disp: , Rfl:   •  loratadine-pseudoephedrine (CLARITIN-D 12-hour) 5-120 MG per 12 hr tablet, Every 12 (Twelve) Hours., Disp: , Rfl:     Allergies:   Allergies   Allergen Reactions   • Penicillins Hives and Unknown - High Severity     Tolerates cephalospins well  Tolerates cephalospins well     • Penicillin V Rash         Physical Exam:  Vital Signs:   Vitals:    06/27/22 1450   BP: 130/80   BP Location: Left arm   Patient Position: Sitting   Cuff Size: Adult   Pulse: 71   Resp: 12   Temp: 97.1 °F (36.2 °C)   TempSrc: Temporal   SpO2: 97%   Weight: 53.5 kg (118 lb)   Height: 154.9 cm (61\")   PainSc: 0-No pain     Body mass index is 22.3 kg/m².     Physical Exam  Vitals and nursing note reviewed.   Constitutional:       Appearance: Normal appearance. She is normal weight.   HENT:      Head: Normocephalic and atraumatic.      Right Ear: Tympanic membrane, ear canal and external ear normal.      Left Ear: Tympanic membrane, ear canal and external ear normal.      Nose: Nose normal.      Mouth/Throat:      Mouth: Mucous membranes are dry.      Pharynx: Oropharynx is clear.   Eyes:      Extraocular Movements: Extraocular movements intact.      Conjunctiva/sclera: Conjunctivae normal.      Pupils: Pupils are equal, round, and reactive to light.   Cardiovascular:      Rate and Rhythm: Normal rate and regular rhythm.      Pulses: Normal pulses.      Heart sounds: Normal heart sounds.   Pulmonary:      Effort: Pulmonary effort is " normal.      Breath sounds: Normal breath sounds.   Musculoskeletal:      Cervical back: Normal range of motion and neck supple.   Feet:      Comments:      Neurological:      Mental Status: She is alert.         Procedures      Assessment/Plan:   Diagnoses and all orders for this visit:    1. Right upper quadrant abdominal pain (Primary)  Assessment & Plan:  CT of abdomen pelvis looked okay.  It does appear that she could have had some organ rearrangement from surgery.  But nothing it looks acute.      2. Armpit pain, left  Assessment & Plan:  Eye exam today did not find any abnormality.  Her C-reactive protein was negative.  Her diagnostic ultrasound was negative.  Recheck in 3 months unless she gets night sweats fevers or any other abnormality      3. Loss of weight  Assessment & Plan:  She weighs 118.  She got down to 112.  She is doing better.  Recheck in 3 months.             Follow Up:   Return in about 3 months (around 9/27/2022).    Markus Garcia MD  [facility]     Answers for HPI/ROS submitted by the patient on 6/21/2022  What is the primary reason for your visit?: Abdominal Pain

## 2022-06-27 NOTE — ASSESSMENT & PLAN NOTE
CT of abdomen pelvis looked okay.  It does appear that she could have had some organ rearrangement from surgery.  But nothing it looks acute.

## 2022-08-08 RX ORDER — FERROUS SULFATE 325(65) MG
TABLET ORAL
Qty: 180 TABLET | Refills: 0 | Status: SHIPPED | OUTPATIENT
Start: 2022-08-08 | End: 2022-09-20

## 2022-08-08 NOTE — TELEPHONE ENCOUNTER
Rx Refill Note    Requested Prescriptions     Pending Prescriptions Disp Refills   • FeroSul 325 (65 Fe) MG tablet [Pharmacy Med Name: FEROSUL 325 MG TABLET] 180 tablet 0     Sig: TAKE ONE TABLET BY MOUTH TWICE A DAY        Last office visit with prescribing clinician: 6/27/2022      Next office visit with prescribing clinician: Visit date not found   Last labs:   Last refill: 05/02/2022   Pharmacy (be sure to add in Epic). correct

## 2022-09-01 ENCOUNTER — OFFICE VISIT (OUTPATIENT)
Dept: BARIATRICS/WEIGHT MGMT | Facility: CLINIC | Age: 72
End: 2022-09-01

## 2022-09-01 VITALS
HEART RATE: 76 BPM | BODY MASS INDEX: 21.69 KG/M2 | DIASTOLIC BLOOD PRESSURE: 84 MMHG | RESPIRATION RATE: 18 BRPM | SYSTOLIC BLOOD PRESSURE: 136 MMHG | WEIGHT: 110.5 LBS | TEMPERATURE: 97.5 F | OXYGEN SATURATION: 99 % | HEIGHT: 60 IN

## 2022-09-01 DIAGNOSIS — N18.9 ANEMIA DUE TO CHRONIC KIDNEY DISEASE, UNSPECIFIED CKD STAGE: ICD-10-CM

## 2022-09-01 DIAGNOSIS — E55.9 VITAMIN D DEFICIENCY: ICD-10-CM

## 2022-09-01 DIAGNOSIS — D63.1 ANEMIA DUE TO CHRONIC KIDNEY DISEASE, UNSPECIFIED CKD STAGE: ICD-10-CM

## 2022-09-01 DIAGNOSIS — Z13.0 SCREENING FOR IRON DEFICIENCY ANEMIA: ICD-10-CM

## 2022-09-01 DIAGNOSIS — Z98.84 H/O GASTRIC BYPASS: ICD-10-CM

## 2022-09-01 DIAGNOSIS — R53.83 FATIGUE, UNSPECIFIED TYPE: Primary | ICD-10-CM

## 2022-09-01 DIAGNOSIS — R63.4 WEIGHT LOSS, UNINTENTIONAL: ICD-10-CM

## 2022-09-01 DIAGNOSIS — R10.84 GENERALIZED ABDOMINAL PAIN: ICD-10-CM

## 2022-09-01 DIAGNOSIS — K21.9 GASTROESOPHAGEAL REFLUX DISEASE WITHOUT ESOPHAGITIS: ICD-10-CM

## 2022-09-01 DIAGNOSIS — Z13.21 MALNUTRITION SCREEN: ICD-10-CM

## 2022-09-01 PROCEDURE — 99204 OFFICE O/P NEW MOD 45 MIN: CPT | Performed by: PHYSICIAN ASSISTANT

## 2022-09-01 RX ORDER — CLONAZEPAM 0.5 MG/1
1 TABLET ORAL EVERY 12 HOURS
COMMUNITY
End: 2022-09-01

## 2022-09-01 NOTE — PROGRESS NOTES
[facility]  [address]  [address]  [facility] [address] [phone]        Patient Name:  Skye De Oliveira  :  1950      Date of Visit: 2022      Reason for Visit:   Weight loss      HPI: Skye De Oliveira is a 71 y.o. female s/p laparoscopic gastric bypass with a 120 cm Minerva limb/ HHR by Dr. Shan Booker at [facility] in [facility] 14. S/p lap elicia/ VLAD for partial SBO by Dr. Booker 2014    LOV 10/2018- complaints of chronic nausea and abdominal pain since POD #1.     Patient was initially seen by our office in 2018 as a transfer of care.  During that initial visit she reported having intractable nausea/vomiting since having her Minerva-en-Y.  She has had had multiple readmissions for electrolyte replacement/fluids and multiple upper GI swallow studies during that time period.  Her main with complaint was nonradiating epigastric abdominal pain postprandial with all intake.  She underwent an extensive work-up with our office that was unremarkable.  She was seen in follow-up in 2018 and had no new complaints.    UGI 18 at [facility] IMPRESSION:  Upper GI series:  1. Status post gastric bypass x5 years. There was no evidence of  extraluminal contrast. No postoperative strictures are seen.  2. Mild gastroesophageal reflux to the level of the thoracic inlet   Small bowel follow-through: Small bowel series appeared within normal  Limits.     EGD with Dr. Jurado 10/15/18- unremarkable. Couple small non obstructing sutures at GJ. No ulcers, esophagitis or visible HH. Small 3cm pouch measuring 37-40cm from incisors.      CT abd pelvis at [facility] 10/19/18 IMPRESSION:  Mild wall thickening seen diffusely throughout the right  colon. Findings suggesting possibly a mild colitis.      She returns today to our office after a 4-year hiatus with concerns of weight loss.   Lost about 10 pounds since May unintentionally. Diet has  "not changed, and may actually be eating more. Notes she feels more fatigued.Still reports the same \"abdominal pain\" and \"sick feeling\" every time she eats-her symptoms have been the same since she had surgery in 2014. No vomiting. Has BM every 2-3 days-usually diarrhea-this is not new.     She did report she had an acute episode of epigastric pain in May that was sharp, stabbing in nature. It only happened one time and has not had any more episodes. She denies any vomiting, fever, recurrent abdominal pain of that nature. 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  No evidence for mechanical obstructive process however.    Taking Omeprazole 40mg daily and renaldo seltzer PRN.  Denies reflux    Taking MVI, Calcium, Vitamin D, Iron.      No ulcerogenics     Past Medical History:   Diagnosis Date   • Adenomatous polyp of colon    • Age-related osteoporosis without current pathological fracture    • Anxiety    • Anxiety    • Anxiety    • Bowel obstruction (HCC)     W/ADHESIONS   • Chest pain     noted during the night, went to ED once. Relieved with alkaseltzer after 15 minutes   • Chronic kidney disease     STAGE 2   • Costochondral chest pain    • Depression    • DJD (degenerative joint disease)     MULTIPLE JOINTS   • GERD without esophagitis    • H/O mammogram 05/24/2016    [facility]   • High risk medication use    • Hypokalemia    • Major depression in remission (HCC)    • Osteoporosis    • Primary insomnia    • Primary insomnia    • Primary osteoarthritis involving multiple joints    • S/P bariatric surgery    • Senile osteoporosis    • Thyroid nodule     MULTIPLE   • Vitamin D deficiency  "     Past Surgical History:   Procedure Laterality Date   • ABDOMINOPLASTY  2007   • APPENDECTOMY     • AUGMENTATION MAMMAPLASTY Bilateral 2008   • BLADDER REPAIR  2011    [facility]   • BREAST AUGMENTATION  2008   • BREAST BIOPSY  2008    Both sides   • BREAST EXCISIONAL BIOPSY Bilateral [id number]   • BREAST SURGERY  2008    REMOVED IMPLANTS AND REMOVED MORE FIROIDS   • CATARACT EXTRACTION     • CHOLECYSTECTOMY  2014   • COLONOSCOPY  2017   • ENDOSCOPY  2014    hiatal hernia, stomach polyps, ulcers   • GASTRIC BYPASS  2014    Dr. Jhony Matthews   • HAMMER TOE REPAIR     • HYSTERECTOMY  1975    AGE 25   • HYSTERECTOMY  1974    AND UILATERAL OOPHHORETOMY   • KNEE SURGERY  2010   • KNEE SURGERY Left    • MASTOPEXY Bilateral 1982    WITH IMPLANT RECONSTRUCTION   • OOPHORECTOMY Right     10 YRS LATER AT AGE 35   • OVARY SURGERY Right 1998    REMOVED   • SHOULDER SURGERY Bilateral     ROTATOR CUFF  RIGHT 2000 LEFT 2001   • STOMACH SURGERY      TUMMY TUCK   • TONSILLECTOMY  1974    ADENOIODECTOMY   • VOCAL CORD BIOPSY  1992    POLYPS REMOVED     Outpatient Medications Marked as Taking for the 9/1/22 encounter (Office Visit) with Ankita Jefferson PA   Medication Sig Dispense Refill   • Biotin 81110 MCG tablet Take  by mouth.     • Cholecalciferol (vitamin D3) 125 MCG (5000 UT) tablet tablet TAKE ONE TABLET BY MOUTH EVERY EVENING 30 tablet 5   • clonazePAM (KlonoPIN) 0.5 MG tablet Take 1 tablet by mouth At Night As Needed for Anxiety. 30 tablet 2   • FeroSul 325 (65 Fe) MG tablet TAKE ONE TABLET BY MOUTH TWICE A  tablet 0   • FOLIC ACID PO Take  by mouth.     • loratadine-pseudoephedrine (CLARITIN-D 12-hour) 5-120 MG per 12 hr tablet Take 1 tablet by mouth Every 12 (Twelve) Hours.     • multivitamin (THERAGRAN) tablet tablet Take 1 tablet by mouth Daily.     • omeprazole (priLOSEC) 40 MG capsule Take 1 capsule by mouth 2 (Two) Times a Day. 180 capsule 0   • Potassium 95 MG tablet Take  by mouth.     •  "Probiotic Product (PROBIOTIC-10 PO) Take  by mouth.     • traMADol (ULTRAM) 50 MG tablet Take 1 tablet by mouth Daily. 30 tablet 2       Allergies   Allergen Reactions   • Penicillins Hives and Unknown - High Severity     Tolerates cephalospins well  Tolerates cephalospins well     • Penicillin V Rash       Social History     Socioeconomic History   • Marital status:    Tobacco Use   • Smoking status: Never Smoker   • Smokeless tobacco: Never Used   Substance and Sexual Activity   • Alcohol use: No   • Drug use: No   • Sexual activity: Not Currently     Partners: Male     Comment: no hormones     Social History     Social History Narrative    Works part time [place] [employer]- . Lives with .        Review of Systems   General: Positive for unintentional weight loss. Denies fever, chills  HEENT: Denies nasal congestion, change in vision, ear pain, change in hearing, soar throat   CARDIAC: Denies chest pain, palpitations, edema   RESP: denies shortness of breath, cough, wheezing   GI: Positive for abdominal pain, nausea, nausea.  Denies vomiting, reflux, dark, tarry stools, melena  Urinary: Denies polyuria, dysuria, hematuria   MSK: denies joint pain, swelling, gout, joint stiffness   Neuro: Denies seizures, weakness, numbness/tingling, LOC   Heme/Endo: denies bleeding, bruising, heat/cold intolerance, sweating   Psych: denies depression, anxiety    /84 (BP Location: Left arm, Patient Position: Sitting)   Pulse 76   Temp 97.5 °F (36.4 °C)   Resp 18   Ht 152.4 cm (60\")   Wt 50.1 kg (110 lb 8 oz)   SpO2 99%   BMI 21.58 kg/m²     Physical Exam  Constitutional:       Appearance: She is obese.   HENT:      Head: Normocephalic and atraumatic.   Cardiovascular:      Rate and Rhythm: Normal rate and regular rhythm.   Pulmonary:      Effort: Pulmonary effort is normal.      Breath sounds: Normal breath sounds.   Abdominal:      General: Bowel sounds are normal. There is no " distension.      Palpations: Abdomen is soft. There is no mass.      Tenderness: There is no abdominal tenderness. There is no guarding or rebound.   Skin:     General: Skin is warm and dry.   Neurological:      General: No focal deficit present.      Mental Status: She is alert and oriented to person, place, and time.   Psychiatric:         Mood and Affect: Mood normal.         Behavior: Behavior normal.           Assessment:      ICD-10-CM ICD-9-CM   1. Fatigue, unspecified type  R53.83 780.79   2. Vitamin D deficiency  E55.9 268.9   3. Malnutrition screen  Z13.21 V77.2   4. Screening for iron deficiency anemia  Z13.0 V78.0   5. Weight loss, unintentional  R63.4 783.21   6. H/O gastric bypass  Z98.84 V45.86   7. Anemia due to chronic kidney disease, unspecified CKD stage  N18.9 285.21    D63.1    8. Gastroesophageal reflux disease without esophagitis  K21.9 530.81       BMI is within normal parameters. No other follow-up for BMI required.      Plan: I will check routine bariatric labs on patient today.  I will also order an upper GI with small bowel follow-through.  She is not having any acute complaints and only notes the same chronic nausea/epigastric pain she has been having since she underwent gastric bypass surgery.  She does however have the CT scan from an outside facility with evidence for the possibility of some malrotation of the small bowel in the absence of acute symptoms.  I will attempt to get these images sent to our office for Dr. Jurado to review.  Avoid aspirin, NSAIDs, steroids for life.    Follow-up in 6 months, sooner if needed    Addendum:    I discussed CT report with Dr. Jurado and he would like to proceed with diagnostic laparoscopy at [facility] in [place].  I have discussed this with the patient and she is agreeable to proceed and understands that depending on findings at time of surgery could result in at least an overnight admission.

## 2022-09-02 ENCOUNTER — LAB (OUTPATIENT)
Dept: FAMILY MEDICINE CLINIC | Facility: CLINIC | Age: 72
End: 2022-09-02

## 2022-09-02 PROCEDURE — 36415 COLL VENOUS BLD VENIPUNCTURE: CPT | Performed by: PHYSICIAN ASSISTANT

## 2022-09-03 LAB
25(OH)D3+25(OH)D2 SERPL-MCNC: 62.9 NG/ML (ref 30–100)
ALBUMIN SERPL-MCNC: 4.2 G/DL (ref 3.8–4.8)
ALBUMIN/GLOB SERPL: 3.2 {RATIO} (ref 1.2–2.2)
ALP SERPL-CCNC: 87 IU/L (ref 44–121)
ALT SERPL-CCNC: 17 IU/L (ref 0–32)
AST SERPL-CCNC: 17 IU/L (ref 0–40)
BASOPHILS # BLD AUTO: 0 X10E3/UL (ref 0–0.2)
BASOPHILS NFR BLD AUTO: 0 %
BILIRUB SERPL-MCNC: 0.4 MG/DL (ref 0–1.2)
BUN SERPL-MCNC: 10 MG/DL (ref 8–27)
BUN/CREAT SERPL: 13 (ref 12–28)
CALCIUM SERPL-MCNC: 9.2 MG/DL (ref 8.7–10.3)
CHLORIDE SERPL-SCNC: 104 MMOL/L (ref 96–106)
CO2 SERPL-SCNC: 27 MMOL/L (ref 20–29)
CREAT SERPL-MCNC: 0.76 MG/DL (ref 0.57–1)
EGFRCR-CYS SERPLBLD CKD-EPI 2021: 84 ML/MIN/1.73
EOSINOPHIL # BLD AUTO: 0 X10E3/UL (ref 0–0.4)
EOSINOPHIL NFR BLD AUTO: 1 %
ERYTHROCYTE [DISTWIDTH] IN BLOOD BY AUTOMATED COUNT: 12.3 % (ref 11.7–15.4)
FERRITIN SERPL-MCNC: 139 NG/ML (ref 15–150)
FOLATE SERPL-MCNC: 9.8 NG/ML
GLOBULIN SER CALC-MCNC: 1.3 G/DL (ref 1.5–4.5)
GLUCOSE SERPL-MCNC: 88 MG/DL (ref 65–99)
HCT VFR BLD AUTO: 40.7 % (ref 34–46.6)
HGB BLD-MCNC: 13.3 G/DL (ref 11.1–15.9)
IMM GRANULOCYTES # BLD AUTO: 0 X10E3/UL (ref 0–0.1)
IMM GRANULOCYTES NFR BLD AUTO: 0 %
IRON SERPL-MCNC: 145 UG/DL (ref 27–139)
LYMPHOCYTES # BLD AUTO: 1.8 X10E3/UL (ref 0.7–3.1)
LYMPHOCYTES NFR BLD AUTO: 39 %
MAGNESIUM SERPL-MCNC: 2.5 MG/DL (ref 1.6–2.3)
MCH RBC QN AUTO: 32.2 PG (ref 26.6–33)
MCHC RBC AUTO-ENTMCNC: 32.7 G/DL (ref 31.5–35.7)
MCV RBC AUTO: 99 FL (ref 79–97)
MONOCYTES # BLD AUTO: 0.3 X10E3/UL (ref 0.1–0.9)
MONOCYTES NFR BLD AUTO: 7 %
NEUTROPHILS # BLD AUTO: 2.4 X10E3/UL (ref 1.4–7)
NEUTROPHILS NFR BLD AUTO: 53 %
PHOSPHATE SERPL-MCNC: 4 MG/DL (ref 3–4.3)
PLATELET # BLD AUTO: 237 X10E3/UL (ref 150–450)
POTASSIUM SERPL-SCNC: 5.4 MMOL/L (ref 3.5–5.2)
PREALB SERPL-MCNC: 21 MG/DL (ref 10–36)
PROT SERPL-MCNC: 5.5 G/DL (ref 6–8.5)
PTH-INTACT SERPL-MCNC: 32 PG/ML (ref 15–65)
RBC # BLD AUTO: 4.13 X10E6/UL (ref 3.77–5.28)
SODIUM SERPL-SCNC: 142 MMOL/L (ref 134–144)
WBC # BLD AUTO: 4.5 X10E3/UL (ref 3.4–10.8)

## 2022-09-06 RX ORDER — SCOLOPAMINE TRANSDERMAL SYSTEM 1 MG/1
1 PATCH, EXTENDED RELEASE TRANSDERMAL ONCE
Status: CANCELLED | OUTPATIENT
Start: 2022-09-06 | End: 2022-09-06

## 2022-09-06 RX ORDER — ACETAMINOPHEN 500 MG
1000 TABLET ORAL ONCE
Status: CANCELLED | OUTPATIENT
Start: 2022-09-06 | End: 2022-09-06

## 2022-09-06 RX ORDER — SODIUM CHLORIDE 9 MG/ML
150 INJECTION, SOLUTION INTRAVENOUS CONTINUOUS
Status: CANCELLED | OUTPATIENT
Start: 2022-09-06

## 2022-09-08 LAB
A-TOCOPHEROL VIT E SERPL-MCNC: 12.5 MG/L (ref 9–29)
GAMMA TOCOPHEROL SERPL-MCNC: 1.5 MG/L (ref 0.5–4.9)
METHYLMALONATE SERPL-SCNC: 353 NMOL/L (ref 0–378)
VIT A SERPL-MCNC: 38 UG/DL (ref 22–69.5)

## 2022-09-09 LAB — PHYTONADIONE SERPL-MCNC: 0.31 NG/ML (ref 0.1–2.2)

## 2022-09-10 LAB — VIT B1 BLD-SCNC: 113.7 NMOL/L (ref 66.5–200)

## 2022-09-13 LAB
25(OH)D3+25(OH)D2 SERPL-MCNC: 60.8 NG/ML (ref 30–100)
A-TOCOPHEROL VIT E SERPL-MCNC: 17.1 MG/L (ref 9–29)
ALBUMIN SERPL-MCNC: 4.4 G/DL (ref 3.8–4.8)
ALBUMIN/GLOB SERPL: 3.7 {RATIO} (ref 1.2–2.2)
ALP SERPL-CCNC: 92 IU/L (ref 44–121)
ALT SERPL-CCNC: 20 IU/L (ref 0–32)
AST SERPL-CCNC: 15 IU/L (ref 0–40)
BASOPHILS # BLD AUTO: 0 X10E3/UL (ref 0–0.2)
BASOPHILS NFR BLD AUTO: 0 %
BILIRUB SERPL-MCNC: 0.4 MG/DL (ref 0–1.2)
BUN SERPL-MCNC: 12 MG/DL (ref 8–27)
BUN/CREAT SERPL: 16 (ref 12–28)
CALCIUM SERPL-MCNC: 9.4 MG/DL (ref 8.7–10.3)
CHLORIDE SERPL-SCNC: 102 MMOL/L (ref 96–106)
CO2 SERPL-SCNC: 26 MMOL/L (ref 20–29)
CREAT SERPL-MCNC: 0.76 MG/DL (ref 0.57–1)
EGFRCR-CYS SERPLBLD CKD-EPI 2021: 84 ML/MIN/1.73
EOSINOPHIL # BLD AUTO: 0 X10E3/UL (ref 0–0.4)
EOSINOPHIL NFR BLD AUTO: 0 %
ERYTHROCYTE [DISTWIDTH] IN BLOOD BY AUTOMATED COUNT: 12.1 % (ref 11.7–15.4)
FERRITIN SERPL-MCNC: 139 NG/ML (ref 15–150)
FOLATE SERPL-MCNC: 10.4 NG/ML
GAMMA TOCOPHEROL SERPL-MCNC: 1.7 MG/L (ref 0.5–4.9)
GLOBULIN SER CALC-MCNC: 1.2 G/DL (ref 1.5–4.5)
GLUCOSE SERPL-MCNC: 91 MG/DL (ref 65–99)
HCT VFR BLD AUTO: 42.6 % (ref 34–46.6)
HGB BLD-MCNC: 14.2 G/DL (ref 11.1–15.9)
IMM GRANULOCYTES # BLD AUTO: 0 X10E3/UL (ref 0–0.1)
IMM GRANULOCYTES NFR BLD AUTO: 0 %
IRON SERPL-MCNC: 193 UG/DL (ref 27–139)
LYMPHOCYTES # BLD AUTO: 1.6 X10E3/UL (ref 0.7–3.1)
LYMPHOCYTES NFR BLD AUTO: 29 %
MAGNESIUM SERPL-MCNC: 2.3 MG/DL (ref 1.6–2.3)
MCH RBC QN AUTO: 32.3 PG (ref 26.6–33)
MCHC RBC AUTO-ENTMCNC: 33.3 G/DL (ref 31.5–35.7)
MCV RBC AUTO: 97 FL (ref 79–97)
METHYLMALONATE SERPL-SCNC: 361 NMOL/L (ref 0–378)
MONOCYTES # BLD AUTO: 0.4 X10E3/UL (ref 0.1–0.9)
MONOCYTES NFR BLD AUTO: 8 %
NEUTROPHILS # BLD AUTO: 3.4 X10E3/UL (ref 1.4–7)
NEUTROPHILS NFR BLD AUTO: 63 %
PHOSPHATE SERPL-MCNC: 4.3 MG/DL (ref 3–4.3)
PHYTONADIONE SERPL-MCNC: 0.35 NG/ML (ref 0.1–2.2)
PLATELET # BLD AUTO: 231 X10E3/UL (ref 150–450)
POTASSIUM SERPL-SCNC: 5.9 MMOL/L (ref 3.5–5.2)
PREALB SERPL-MCNC: 22 MG/DL (ref 10–36)
PROT SERPL-MCNC: 5.6 G/DL (ref 6–8.5)
PTH-INTACT SERPL-MCNC: 56 PG/ML (ref 15–65)
RBC # BLD AUTO: 4.4 X10E6/UL (ref 3.77–5.28)
SODIUM SERPL-SCNC: 141 MMOL/L (ref 134–144)
VIT A SERPL-MCNC: 44.2 UG/DL (ref 22–69.5)
VIT B1 BLD-SCNC: 170.5 NMOL/L (ref 66.5–200)
WBC # BLD AUTO: 5.5 X10E3/UL (ref 3.4–10.8)
ZINC SERPL-MCNC: 100 UG/DL (ref 44–115)

## 2022-09-14 ENCOUNTER — OFFICE VISIT (OUTPATIENT)
Dept: FAMILY MEDICINE CLINIC | Facility: CLINIC | Age: 72
End: 2022-09-14

## 2022-09-14 VITALS
HEIGHT: 60 IN | HEART RATE: 70 BPM | DIASTOLIC BLOOD PRESSURE: 60 MMHG | RESPIRATION RATE: 12 BRPM | WEIGHT: 116.5 LBS | SYSTOLIC BLOOD PRESSURE: 138 MMHG | BODY MASS INDEX: 22.87 KG/M2 | OXYGEN SATURATION: 97 % | TEMPERATURE: 98 F

## 2022-09-14 DIAGNOSIS — Z79.899 ENCOUNTER FOR LONG-TERM (CURRENT) USE OF OTHER MEDICATIONS: ICD-10-CM

## 2022-09-14 DIAGNOSIS — N18.31 CHRONIC KIDNEY DISEASE, STAGE 3A: ICD-10-CM

## 2022-09-14 DIAGNOSIS — R63.4 LOSS OF WEIGHT: ICD-10-CM

## 2022-09-14 DIAGNOSIS — F41.9 ANXIETY: Primary | ICD-10-CM

## 2022-09-14 PROCEDURE — 80305 DRUG TEST PRSMV DIR OPT OBS: CPT | Performed by: FAMILY MEDICINE

## 2022-09-14 PROCEDURE — 99214 OFFICE O/P EST MOD 30 MIN: CPT | Performed by: FAMILY MEDICINE

## 2022-09-14 RX ORDER — CLONAZEPAM 0.5 MG/1
0.5 TABLET ORAL NIGHTLY PRN
Qty: 30 TABLET | Refills: 2 | Status: SHIPPED | OUTPATIENT
Start: 2022-09-14 | End: 2022-12-09 | Stop reason: SDUPTHER

## 2022-09-14 NOTE — ASSESSMENT & PLAN NOTE
Patient has lost some weight and that is unexplained.  All Blood work that is normal.  Is following up with weight loss surgeon as they are looking into this problem.  Told her if they cannot find a reason to come see me.  Recheck in 3 months.

## 2022-09-14 NOTE — PROGRESS NOTES
Patient Name: Skye De Oliveira  : 1950   MRN: [id number]     Chief Complaint:    Chief Complaint   Patient presents with   • lab results     Pt here for lab results   • Anxiety   • Med Refill       History of Present Illness: Skye De Oliveira is a 71 y.o. female who is here today for follow up. On anxiety  HPI        Review of Systems:   Review of Systems   Constitutional: Negative.    HENT: Negative.    Eyes: Negative.    Respiratory: Negative.    Cardiovascular: Negative.    Gastrointestinal: Negative.    Neurological: Negative.         Past Medical History:   Past Medical History:   Diagnosis Date   • Adenomatous polyp of colon    • Age-related osteoporosis without current pathological fracture    • Anxiety    • Anxiety    • Anxiety    • Bowel obstruction (HCC)     W/ADHESIONS   • Chest pain     noted during the night, went to ED once. Relieved with alkaseltzer after 15 minutes   • Chronic kidney disease     STAGE 2   • Costochondral chest pain    • Depression    • DJD (degenerative joint disease)     MULTIPLE JOINTS   • GERD without esophagitis    • H/O mammogram 2016    [facility]   • High risk medication use    • Hypokalemia    • Major depression in remission (HCC)    • Osteoporosis    • Primary insomnia    • Primary insomnia    • Primary osteoarthritis involving multiple joints    • S/P bariatric surgery    • Senile osteoporosis    • Thyroid nodule     MULTIPLE   • Vitamin D deficiency        Past Surgical History:   Past Surgical History:   Procedure Laterality Date   • ABDOMINOPLASTY     • APPENDECTOMY     • AUGMENTATION MAMMAPLASTY Bilateral    • BLADDER REPAIR      [facility]   • BREAST AUGMENTATION     • BREAST BIOPSY  2008    Both sides   • BREAST EXCISIONAL BIOPSY Bilateral [id number]   • BREAST SURGERY      REMOVED IMPLANTS AND REMOVED MORE FIROIDS   • CATARACT EXTRACTION     • CHOLECYSTECTOMY     • COLONOSCOPY     • ENDOSCOPY  2014    hiatal hernia, stomach  polyps, ulcers   • GASTRIC BYPASS  2014    Dr. Jhony Matthews   • HAMMER TOE REPAIR     • HYSTERECTOMY  1975    AGE 25   • HYSTERECTOMY  1974    AND UILATERAL OOPHHORETOMY   • KNEE SURGERY  2010   • KNEE SURGERY Left    • MASTOPEXY Bilateral 1982    WITH IMPLANT RECONSTRUCTION   • OOPHORECTOMY Right     10 YRS LATER AT AGE 35   • OVARY SURGERY Right 1998    REMOVED   • SHOULDER SURGERY Bilateral     ROTATOR CUFF  RIGHT 2000 LEFT 2001   • STOMACH SURGERY      TUMMY TUCK   • TONSILLECTOMY  1974    ADENOIODECTOMY   • VOCAL CORD BIOPSY  1992    POLYPS REMOVED       Family History:   Family History   Problem Relation Age of Onset   • Lung cancer Mother    • Arthritis Mother    • Cancer Mother         Lung cancer   • Lung cancer Father 49   • Cancer Father         Lung cancer   • Diabetes Brother    • Hearing loss Brother    • Hypertension Brother    • Breast cancer Neg Hx    • Ovarian cancer Neg Hx    • Endometrial cancer Neg Hx        Social History:   Social History     Socioeconomic History   • Marital status:    Tobacco Use   • Smoking status: Never Smoker   • Smokeless tobacco: Never Used   Substance and Sexual Activity   • Alcohol use: No   • Drug use: No   • Sexual activity: Not Currently     Partners: Male     Comment: no hormones       Medications:     Current Outpatient Medications:   •  Biotin 42349 MCG tablet, Take  by mouth., Disp: , Rfl:   •  Cholecalciferol (vitamin D3) 125 MCG (5000 UT) tablet tablet, TAKE ONE TABLET BY MOUTH EVERY EVENING, Disp: 30 tablet, Rfl: 5  •  clonazePAM (KlonoPIN) 0.5 MG tablet, Take 1 tablet by mouth At Night As Needed for Anxiety., Disp: 30 tablet, Rfl: 2  •  Cyanocobalamin 1000 MCG/ML kit, Inject 1,000 mcg as directed Daily for 14 days., Disp: 14 kit, Rfl: 0  •  FeroSul 325 (65 Fe) MG tablet, TAKE ONE TABLET BY MOUTH TWICE A DAY, Disp: 180 tablet, Rfl: 0  •  FOLIC ACID PO, Take  by mouth., Disp: , Rfl:   •  loratadine-pseudoephedrine (CLARITIN-D 12-hour)  "5-120 MG per 12 hr tablet, Take 1 tablet by mouth Every 12 (Twelve) Hours., Disp: , Rfl:   •  multivitamin (THERAGRAN) tablet tablet, Take 1 tablet by mouth Daily., Disp: , Rfl:   •  omeprazole (priLOSEC) 40 MG capsule, Take 1 capsule by mouth 2 (Two) Times a Day., Disp: 180 capsule, Rfl: 0  •  Potassium 95 MG tablet, Take  by mouth., Disp: , Rfl:   •  Probiotic Product (PROBIOTIC-10 PO), Take  by mouth., Disp: , Rfl:   •  traMADol (ULTRAM) 50 MG tablet, Take 1 tablet by mouth Daily., Disp: 30 tablet, Rfl: 2    Allergies:   Allergies   Allergen Reactions   • Penicillins Hives and Unknown - High Severity     Tolerates cephalospins well  Tolerates cephalospins well     • Penicillin V Rash         Physical Exam:  Vital Signs:   Vitals:    09/14/22 1427   BP: 138/60   BP Location: Left arm   Patient Position: Sitting   Cuff Size: Adult   Pulse: 70   Resp: 12   Temp: 98 °F (36.7 °C)   TempSrc: Temporal   SpO2: 97%   Weight: 52.8 kg (116 lb 8 oz)   Height: 152.4 cm (60\")   PainSc: 0-No pain     Body mass index is 22.75 kg/m².     Physical Exam  Vitals and nursing note reviewed.   Constitutional:       Appearance: Normal appearance. She is normal weight.   HENT:      Head: Normocephalic and atraumatic.      Right Ear: Tympanic membrane, ear canal and external ear normal.      Left Ear: Tympanic membrane, ear canal and external ear normal.      Nose: Nose normal.      Mouth/Throat:      Mouth: Mucous membranes are dry.      Pharynx: Oropharynx is clear.   Eyes:      Extraocular Movements: Extraocular movements intact.      Conjunctiva/sclera: Conjunctivae normal.      Pupils: Pupils are equal, round, and reactive to light.   Cardiovascular:      Rate and Rhythm: Normal rate and regular rhythm.      Pulses: Normal pulses.      Heart sounds: Normal heart sounds.   Pulmonary:      Effort: Pulmonary effort is normal.      Breath sounds: Normal breath sounds.   Musculoskeletal:      Cervical back: Normal range of motion and neck " supple.   Feet:      Comments:      Neurological:      Mental Status: She is alert.         Procedures      Assessment/Plan:   Diagnoses and all orders for this visit:    1. Anxiety (Primary)  Assessment & Plan:  Refill Klonopin.  House bill 1.    Orders:  -     clonazePAM (KlonoPIN) 0.5 MG tablet; Take 1 tablet by mouth At Night As Needed for Anxiety.  Dispense: 30 tablet; Refill: 2    2. Encounter for long-term (current) use of other medications  Assessment & Plan:  We will follow.    Orders:  -     POC Urine Drug Screen, Triage    3. Chronic kidney disease, stage 3a (HCC)  Assessment & Plan:  Kidney function normal.    Orders:  -     clonazePAM (KlonoPIN) 0.5 MG tablet; Take 1 tablet by mouth At Night As Needed for Anxiety.  Dispense: 30 tablet; Refill: 2    4. Loss of weight  Assessment & Plan:  Patient has lost some weight and that is unexplained.  All Blood work that is normal.  Is following up with weight loss surgeon as they are looking into this problem.  Told her if they cannot find a reason to come see me.  Recheck in 3 months.             Follow Up:   Return in about 3 months (around 12/14/2022).    Markus Garcia MD  [facility]     Answers for HPI/ROS submitted by the patient on 9/7/2022  Please describe your symptoms.: Refill prescription  Have you had these symptoms before?: Yes  How long have you been having these symptoms?: Greater than 2 weeks  What is the primary reason for your visit?: Other

## 2022-09-16 ENCOUNTER — OFFICE VISIT (OUTPATIENT)
Dept: BARIATRICS/WEIGHT MGMT | Facility: CLINIC | Age: 72
End: 2022-09-16

## 2022-09-16 VITALS
HEART RATE: 78 BPM | BODY MASS INDEX: 21.89 KG/M2 | TEMPERATURE: 98 F | DIASTOLIC BLOOD PRESSURE: 70 MMHG | SYSTOLIC BLOOD PRESSURE: 126 MMHG | HEIGHT: 60 IN | WEIGHT: 111.5 LBS | OXYGEN SATURATION: 98 %

## 2022-09-16 DIAGNOSIS — Z01.818 PREOPERATIVE CLEARANCE: Primary | ICD-10-CM

## 2022-09-16 PROCEDURE — 99214 OFFICE O/P EST MOD 30 MIN: CPT | Performed by: PHYSICIAN ASSISTANT

## 2022-09-16 RX ORDER — RALOXIFENE HYDROCHLORIDE 60 MG/1
60 TABLET, FILM COATED ORAL DAILY
COMMUNITY
Start: 2022-09-05 | End: 2022-11-08

## 2022-09-16 NOTE — PROGRESS NOTES
"[facility]  [address]  [address]  [facility] [id number] [phone]        Patient Name:  Skye De Oliveira  :  1950      Date of Visit: 2022      Reason for Visit:   Abdominal pain    HPI: Skye De Oliveira is a 71 y.o. female s/p laparoscopic gastric bypass with a 120 cm Minerva limb/ HHR by Dr. Shan Booker at [facility] in [facility] 14. S/p lap elicia/ VLAD for partial SBO by Dr. Booker 2014    LOV 22:     \"LOV 10/2018- complaints of chronic nausea and abdominal pain since POD #1.      Patient was initially seen by our office in 2018 as a transfer of care.  During that initial visit she reported having intractable nausea/vomiting since having her Minerva-en-Y.  She has had had multiple readmissions for electrolyte replacement/fluids and multiple upper GI swallow studies during that time period.  Her main with complaint was nonradiating epigastric abdominal pain postprandial with all intake.  She underwent an extensive work-up with our office that was unremarkable.  She was seen in follow-up in 2018 and had no new complaints.     UGI 18 at [facility] IMPRESSION:  Upper GI series:  1. Status post gastric bypass x5 years. There was no evidence of  extraluminal contrast. No postoperative strictures are seen.  2. Mild gastroesophageal reflux to the level of the thoracic inlet   Small bowel follow-through: Small bowel series appeared within normal  Limits.     EGD with Dr. Jurado 10/15/18- unremarkable. Couple small non obstructing sutures at GJ. No ulcers, esophagitis or visible HH. Small 3cm pouch measuring 37-40cm from incisors.      CT abd pelvis at [facility] 10/19/18 IMPRESSION:  Mild wall thickening seen diffusely throughout the right  colon. Findings suggesting possibly a mild colitis.        She returns today to our office after a 4-year hiatus with concerns of weight loss.   Lost about 10 pounds since May " "unintentionally. Diet has not changed, and may actually be eating more. Notes she feels more fatigued.Still reports the same \"abdominal pain\" and \"sick feeling\" every time she eats-her symptoms have been the same since she had surgery in 2014. No vomiting. Has BM every 2-3 days-usually diarrhea-this is not new.      She did report she had an acute episode of epigastric pain in May that was sharp, stabbing in nature. It only happened one time and has not had any more episodes. She denies any vomiting, fever, recurrent abdominal pain of that nature. 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  No evidence for mechanical obstructive process however.     Taking Omeprazole 40mg daily and renaldo seltzer PRN.  Denies reflux     Taking MVI, Calcium, Vitamin D, Iron.       No ulcerogenics\"      Today's update:     Patient presents today to discuss her previous CT scan findings as well as diagnostic laparoscopy with possible small bowel resection as recommended by Dr. Jurado.  She presents today with her  and all questions were answered regarding her CT scan and the benefits versus risks of diagnostic laparoscopy.  She has not had any further acute episodes of abdominal pain.  She still has the same chronic discomfort she has had since having her Minerva-en-Y gastric bypass surgery.  She has not had any further weight loss and does feel that it has tapered.      Past Medical History:   Diagnosis Date   • Adenomatous polyp of colon    • Age-related osteoporosis without current pathological fracture    • Anxiety    • Anxiety    • Anxiety    • Bowel obstruction (HCC)     W/ADHESIONS   • Chest " pain     noted during the night, went to ED once. Relieved with alkaseltzer after 15 minutes   • Chronic kidney disease     STAGE 2   • Costochondral chest pain    • Depression    • DJD (degenerative joint disease)     MULTIPLE JOINTS   • GERD without esophagitis    • H/O mammogram 05/24/2016    [facility]   • High risk medication use    • Hypokalemia    • Major depression in remission (HCC)    • Osteoporosis    • Primary insomnia    • Primary insomnia    • Primary osteoarthritis involving multiple joints    • S/P bariatric surgery    • Senile osteoporosis    • Thyroid nodule     MULTIPLE   • Vitamin D deficiency      Past Surgical History:   Procedure Laterality Date   • ABDOMINOPLASTY  2007   • APPENDECTOMY     • AUGMENTATION MAMMAPLASTY Bilateral 2008   • BLADDER REPAIR  2011    [facility]   • BREAST AUGMENTATION  2008   • BREAST AUGMENTATION  2008   • BREAST AUGMENTATION  2008   • BREAST AUGMENTATION  2008   • BREAST AUGMENTATION  2008   • BREAST BIOPSY  2008    Both sides   • BREAST EXCISIONAL BIOPSY Bilateral [id number]   • BREAST SURGERY  2008    REMOVED IMPLANTS AND REMOVED MORE FIROIDS   • CATARACT EXTRACTION     • CHOLECYSTECTOMY  2014   • COLONOSCOPY  2017   • ENDOSCOPY  2014    hiatal hernia, stomach polyps, ulcers   • GASTRIC BYPASS  2014    Dr. Jhony Matthews   • HAMMER TOE REPAIR     • HYSTERECTOMY  1975    AGE 25   • HYSTERECTOMY  1974    AND UILATERAL OOPHHORETOMY   • KNEE SURGERY  2010   • KNEE SURGERY Left    • MASTOPEXY Bilateral 1982    WITH IMPLANT RECONSTRUCTION   • OOPHORECTOMY Right     10 YRS LATER AT AGE 35   • OVARY SURGERY Right 1998    REMOVED   • SHOULDER SURGERY Bilateral     ROTATOR CUFF  RIGHT 2000 LEFT 2001   • STOMACH SURGERY      [facility]   • TONSILLECTOMY  1974    ADENOIODECTOMY   • VOCAL CORD BIOPSY  1992    POLYPS REMOVED     Outpatient Medications Marked as Taking for the 9/16/22 encounter (Office Visit) with Ankita Jefferson PA   Medication Sig Dispense Refill   • Biotin  09047 MCG tablet Take  by mouth.     • Cholecalciferol (vitamin D3) 125 MCG (5000 UT) tablet tablet TAKE ONE TABLET BY MOUTH EVERY EVENING 30 tablet 5   • clonazePAM (KlonoPIN) 0.5 MG tablet Take 1 tablet by mouth At Night As Needed for Anxiety. 30 tablet 2   • Cyanocobalamin 1000 MCG/ML kit Inject 1,000 mcg into the appropriate muscle as directed by prescriber Daily for 14 days. 14 kit 0   • FeroSul 325 (65 Fe) MG tablet TAKE ONE TABLET BY MOUTH TWICE A  tablet 0   • FOLIC ACID PO Take  by mouth.     • loratadine-pseudoephedrine (CLARITIN-D 12-hour) 5-120 MG per 12 hr tablet Take 1 tablet by mouth Every 12 (Twelve) Hours.     • multivitamin (THERAGRAN) tablet tablet Take 1 tablet by mouth Daily.     • Needles & Syringes misc Inject 1 mL into the appropriate muscle as directed by prescriber Daily for 14 days. 14 each 0   • omeprazole (priLOSEC) 40 MG capsule Take 1 capsule by mouth 2 (Two) Times a Day. 180 capsule 0   • Potassium 95 MG tablet Take  by mouth.     • Probiotic Product (PROBIOTIC-10 PO) Take  by mouth.     • raloxifene (EVISTA) 60 MG tablet      • traMADol (ULTRAM) 50 MG tablet Take 1 tablet by mouth Daily. 30 tablet 2       Allergies   Allergen Reactions   • Penicillins Hives and Unknown - High Severity     Tolerates cephalospins well  Tolerates cephalospins well     • Penicillin V Rash       Social History     Socioeconomic History   • Marital status:    Tobacco Use   • Smoking status: Never Smoker   • Smokeless tobacco: Never Used   Substance and Sexual Activity   • Alcohol use: No   • Drug use: No   • Sexual activity: Not Currently     Partners: Male     Comment: no hormones     Social History     Social History Narrative    Works part time [place] [employer]- . Lives with .        Review of Systems   General: Denies fever, chills, unintentional weight loss   HEENT: Denies nasal congestion, change in vision, ear pain, change in hearing, soar throat  "  CARDIAC: Denies chest pain, palpitations, edema   RESP: denies shortness of breath, cough, wheezing   GI: Positive for abdominal pain, denies nausea, vomiting, diarrhea, constipation, reflux   Urinary: Denies polyuria, dysuria, hematuria   MSK: denies joint pain, swelling, gout, joint stiffness   Neuro: Denies seizures, weakness, numbness/tingling, LOC   Heme/Endo: denies bleeding, bruising, heat/cold intolerance, sweating   Psych: denies depression, anxiety    /70   Pulse 78   Temp 98 °F (36.7 °C)   Ht 152.4 cm (60\")   Wt 50.6 kg (111 lb 8 oz)   SpO2 98%   BMI 21.78 kg/m²     Physical Exam  HENT:      Head: Normocephalic and atraumatic.   Cardiovascular:      Rate and Rhythm: Normal rate and regular rhythm.   Pulmonary:      Effort: Pulmonary effort is normal.      Breath sounds: Normal breath sounds.   Abdominal:      General: Bowel sounds are normal. There is no distension.      Palpations: Abdomen is soft. There is no mass.      Tenderness: There is no abdominal tenderness.   Skin:     General: Skin is warm and dry.   Neurological:      General: No focal deficit present.      Mental Status: She is alert and oriented to person, place, and time.   Psychiatric:         Mood and Affect: Mood normal.         Behavior: Behavior normal.           Assessment:      ICD-10-CM ICD-9-CM   1. Preoperative clearance  Z01.818 V72.84       BMI is within normal parameters. No other follow-up for BMI required.      Plan: Recent CT scan with evidence of swirl sign, concerning for mesenteric defect and possible internal hernia from previous gastric bypass.  Dr. Jurado has recommended diagnostic laparoscopy at [facility] in [place] with the possibility of small bowel resection.    Will proceed with diagnostic laparoscopy with possible small bowel resection. R/b/a rx discussed including but not limited to bleeding, infection, damage to surrounding structures including bowel injury, pulm complications, venothromboembolic events, " death etc and wishes to proceed.     I will have patient obtain cardiac clearance prior to surgery.  She would like to discuss this further with her  as far as timing.  She understands that should she develop any acute symptoms she needs to contact our office or present to the ER.

## 2022-09-20 ENCOUNTER — OFFICE VISIT (OUTPATIENT)
Dept: CARDIOLOGY | Facility: CLINIC | Age: 72
End: 2022-09-20

## 2022-09-20 VITALS
BODY MASS INDEX: 22.19 KG/M2 | HEART RATE: 66 BPM | OXYGEN SATURATION: 98 % | WEIGHT: 113 LBS | HEIGHT: 60 IN | DIASTOLIC BLOOD PRESSURE: 88 MMHG | SYSTOLIC BLOOD PRESSURE: 154 MMHG

## 2022-09-20 DIAGNOSIS — Z01.818 PRE-OPERATIVE CLEARANCE: Primary | ICD-10-CM

## 2022-09-20 PROBLEM — R94.31 ABNORMAL EKG: Status: ACTIVE | Noted: 2022-09-20

## 2022-09-20 PROBLEM — R94.31 ABNORMAL EKG: Status: RESOLVED | Noted: 2022-09-20 | Resolved: 2022-09-20

## 2022-09-20 PROCEDURE — 93000 ELECTROCARDIOGRAM COMPLETE: CPT | Performed by: PHYSICIAN ASSISTANT

## 2022-09-20 PROCEDURE — 99213 OFFICE O/P EST LOW 20 MIN: CPT | Performed by: PHYSICIAN ASSISTANT

## 2022-09-20 NOTE — PROGRESS NOTES
[facility]  INITIAL OFFICE CONSULT      Skye De Oliveira  1950  PCP: Markus Garcia MD    SUBJECTIVE:   Skye De Oliveira is a 71 y.o. female seen for a consultation visit regarding the following:     Chief Complaint:   Chief Complaint   Patient presents with   • Surgical Clearance     consult          Consultation is requested by WILLIAMS Magaña for evaluation of Surgical Clearance (consult)        History:  Pleasant 71-year-old female retired from [employer] presents today for initial consultation and evaluation prior to requiring abdominal surgery.  Patient denies any previous cardiac history.  She states she underwent a cardiac evaluation previous to her Minerva-en-Y surgery back in 2014 as well as she recently had right hip surgery 2 years ago at .  She did well with both the surgeries from a cardiac standpoint.  She denies having any chest pain or chest tightness exertion suggesting angina pectoris.  She denies any heart failure symptoms dizziness near syncope or syncope.  She denies any history of having a cardiac murmur.  She states she is a quite active person she bowls on a regular basis she also plays golf on a regular basis and actually played golf treatment today.  She is quite active while walking dog is also exercising doing yoga she has had no symptoms that are concerning for cardiac issues.  Unfortunately she is having lower abdominal pain and this has been present for the most part ever since her previous gastric bypass surgery although she is lost a symptomatic weight she is also dealt with a lot of significant gastric pain.  She may require further surgery regarding small bowel disease.  She presents today for cardiac evaluation prior to her surgery.      Cardiac PMH: (Old records have been reviewed and summarized below)  1. Pre op evulation  2.  Moderate obesity, Gastric bypass 2014 with Lap eliciaVLAD for partial SBO Dr. Booker 2/17/2014.    3. 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   4. Right hip muscle tear, surgical repair  Hospital 2020  5. Reflux, GERD  6. Chang        Past Medical History, Past Surgical History, Family history, Social History, and Medications were all reviewed with the patient today and updated as necessary.     Current Outpatient Medications   Medication Sig Dispense Refill   • Cholecalciferol (vitamin D3) 125 MCG (5000 UT) tablet tablet TAKE ONE TABLET BY MOUTH EVERY EVENING 30 tablet 5   • clonazePAM (KlonoPIN) 0.5 MG tablet Take 1 tablet by mouth At Night As Needed for Anxiety. 30 tablet 2   • Cyanocobalamin 1000 MCG/ML kit Inject 1,000 mcg into the appropriate muscle as directed by prescriber Daily for 14 days. 14 kit 0   • loratadine-pseudoephedrine (CLARITIN-D 12-hour) 5-120 MG per 12 hr tablet Take 1 tablet by mouth Every 12 (Twelve) Hours.     • multivitamin (THERAGRAN) tablet tablet Take 1 tablet by mouth Daily.     • Needles & Syringes misc Inject 1 mL into the appropriate muscle as directed by prescriber Daily for 14 days. 14 each 0   • omeprazole (priLOSEC) 40 MG capsule Take 1 capsule by mouth 2 (Two) Times a Day. 180 capsule 0   • Probiotic Product (PROBIOTIC-10 PO) Take  by mouth.     • raloxifene (EVISTA) 60 MG tablet      • traMADol (ULTRAM) 50 MG tablet Take 1 tablet by mouth Daily. 30 tablet 2     No current facility-administered medications for this visit.     Allergies   Allergen Reactions   • Penicillins Hives and Unknown - High Severity     Tolerates cephalospins well  Tolerates cephalospins well     • Penicillin V Rash         Past Medical History:   Diagnosis Date   • Adenomatous polyp of colon    • Age-related osteoporosis without current pathological fracture    • Anxiety    • Anxiety    • Anxiety    • Bowel obstruction (HCC)     W/ADHESIONS   • Chest pain     noted during the night, went to ED once. Relieved with alkaseltzer after 15 minutes   • Chronic kidney  disease     STAGE 2   • Costochondral chest pain    • Depression    • DJD (degenerative joint disease)     MULTIPLE JOINTS   • GERD without esophagitis    • H/O mammogram 05/24/2016    [facility]   • High risk medication use    • Hypokalemia    • Major depression in remission (HCC)    • Osteoporosis    • Primary insomnia    • Primary insomnia    • Primary osteoarthritis involving multiple joints    • S/P bariatric surgery    • Senile osteoporosis    • Thyroid nodule     MULTIPLE   • Vitamin D deficiency      Past Surgical History:   Procedure Laterality Date   • ABDOMINOPLASTY  2007   • APPENDECTOMY     • AUGMENTATION MAMMAPLASTY Bilateral 2008   • BLADDER REPAIR  2011    [facility]   • BREAST AUGMENTATION  2008   • BREAST AUGMENTATION  2008   • BREAST AUGMENTATION  2008   • BREAST AUGMENTATION  2008   • BREAST AUGMENTATION  2008   • BREAST BIOPSY  2008    Both sides   • BREAST EXCISIONAL BIOPSY Bilateral [id number]   • BREAST SURGERY  2008    REMOVED IMPLANTS AND REMOVED MORE FIROIDS   • CATARACT EXTRACTION     • CHOLECYSTECTOMY  2014   • COLONOSCOPY  2017   • ENDOSCOPY  2014    hiatal hernia, stomach polyps, ulcers   • GASTRIC BYPASS  2014    Dr. Jhony Matthews   • HAMMER TOE REPAIR     • HIP ARTHROPLASTY  2020   • HYSTERECTOMY  1975    AGE 25   • HYSTERECTOMY  1974    AND UILATERAL OOPHHORETOMY   • KNEE SURGERY  2010   • KNEE SURGERY Left    • MASTOPEXY Bilateral 1982    WITH IMPLANT RECONSTRUCTION   • OOPHORECTOMY Right     10 YRS LATER AT AGE 35   • OVARY SURGERY Right 1998    REMOVED   • SHOULDER SURGERY Bilateral     ROTATOR CUFF  RIGHT 2000 LEFT 2001   • STOMACH SURGERY      TUMMY TUCK   • TONSILLECTOMY  1974    ADENOIODECTOMY   • VOCAL CORD BIOPSY  1992    POLYPS REMOVED     Family History   Problem Relation Age of Onset   • Lung cancer Mother    • Arthritis Mother    • Cancer Mother         Lung cancer   • Lung cancer Father 49   • Cancer Father         Lung cancer   • Diabetes Brother    • Hearing loss  "Brother    • Hypertension Brother    • Breast cancer Neg Hx    • Ovarian cancer Neg Hx    • Endometrial cancer Neg Hx      Social History     Tobacco Use   • Smoking status: Never Smoker   • Smokeless tobacco: Never Used   Substance Use Topics   • Alcohol use: No       ROS:  Review of Symptoms:  General: no recent weight loss/gain, weakness or fatigue  Skin: no rashes, lumps, or other skin changes  HEENT: no dizziness, lightheadedness, or vision changes  Respiratory: no cough or hemoptysis  Cardiovascular: no palpitations, and tachycardia  Gastrointestinal: no black/tarry stools or diarrhea  Urinary: no change in frequency or urgency  Peripheral Vascular: no claudication or leg cramps  Musculoskeletal: Right hip pain  Psychiatric: Anxiety/depression  Neurological: no sensory or motor loss, no syncope  Hematologic: no anemia, easy bruising or bleeding  Endocrine: no thyroid problems, nor heat or cold intolerance         PHYSICAL EXAM:   /88 (BP Location: Right arm, Patient Position: Sitting)   Pulse 66   Ht 152.4 cm (60\")   Wt 51.3 kg (113 lb)   SpO2 98%   BMI 22.07 kg/m²      Wt Readings from Last 5 Encounters:   09/20/22 51.3 kg (113 lb)   09/16/22 50.6 kg (111 lb 8 oz)   09/14/22 52.8 kg (116 lb 8 oz)   09/01/22 50.1 kg (110 lb 8 oz)   06/27/22 53.5 kg (118 lb)     BP Readings from Last 5 Encounters:   09/20/22 154/88   09/16/22 126/70   09/14/22 138/60   09/01/22 136/84   06/27/22 130/80       General-Well Nourished, Well developed  Eyes - PERRLA  Neck- supple, No mass  CV- regular rate and rhythm, no MRG  Lung- clear bilaterally  Abd- soft, +BS  Musc/skel - Norm strength and range of motion  Skin- warm and dry  Neuro - Alert & Oriented x 3, appropriate mood.    Patient's external notes were reviewed.  Independent interpretation of test performed by another physician in facility were reviewed.  Outside laboratory data was also reviewed.    Medical problems and test results were reviewed with the " "patient today.     Results for orders placed or performed in visit on 09/14/22   POC Urine Drug Screen, Triage    Specimen: Urine   Result Value Ref Range    Methamphetaine Screen, Urine Negative Negative    POC Amphetamines Negative Negative    Barbiturates Screen Negative Negative    Benzodiazepine Screen Positive (A) Negative    Cocaine Screen Negative Negative    Methadone Screen Negative Negative    Opiate Screen Negative Negative    Oxycodone, Screen Negative Negative    Phencyclidine (PCP) Screen Negative Negative    Propoxyphene Screen Negative Negative    THC, Screen Negative Negative    Tricyclic Antidepressants Screen Negative Negative           EKG:  (EKG/Tracing has been independently visualized by me and summarized below)      ECG 12 Lead    Date/Time: 9/20/2022 9:30 AM  Performed by: Herrera Raymond PA  Authorized by: Herrera Raymond PA   Rhythm: sinus rhythm  Rate: normal  Conduction: conduction normal  ST Segments: ST segments normal  T Waves: T waves normal  QRS axis: normal    Clinical impression: normal ECG            ASSESSMENT   1. Pre op clearance  2. Epigastric pain/Abnormal CT scan.   3.  Remote gastric bypass surgery revision 2014  4.  Right hip muscle tear, surgical revision  2020  5.  Anxiety            PLAN  · Pleasant 71-year-old female with no previous cardiac history.  Her EKG today is normal.  She is having no symptoms that suggest cardiac disease such as no angina type symptoms or heart failure symptoms.  She has had no palpitations dizziness near syncope or syncope.  Her physical exam today is normal.  She did have elevated blood pressure which she states could be \"whitecoat\" syndrome as she has been nervous coming to the cardiologist in the past.  Overall from a cardiac standpoint she is low risk to pursue abdominal surgery.  She return follow-up her office as needed basis.          Cardiology/Electrophysiology  09/20/22  09:26 EDT  Will Segundo FRANCO  "

## 2022-09-21 PROBLEM — R10.84 GENERALIZED ABDOMINAL PAIN: Status: ACTIVE | Noted: 2022-09-21

## 2022-09-28 ENCOUNTER — HOSPITAL ENCOUNTER (OUTPATIENT)
Dept: GENERAL RADIOLOGY | Facility: HOSPITAL | Age: 72
Discharge: HOME | End: 2022-09-28
Admitting: PHYSICIAN ASSISTANT

## 2022-09-28 DIAGNOSIS — K21.9 GASTROESOPHAGEAL REFLUX DISEASE WITHOUT ESOPHAGITIS: ICD-10-CM

## 2022-09-28 PROCEDURE — 74248 X-RAY SM INT F-THRU STD: CPT

## 2022-09-28 PROCEDURE — 74240 X-RAY XM UPR GI TRC 1CNTRST: CPT

## 2022-10-03 ENCOUNTER — TELEMEDICINE (OUTPATIENT)
Dept: BARIATRICS/WEIGHT MGMT | Facility: CLINIC | Age: 72
End: 2022-10-03

## 2022-10-03 VITALS — WEIGHT: 110 LBS | HEIGHT: 60 IN | BODY MASS INDEX: 21.6 KG/M2

## 2022-10-03 DIAGNOSIS — G89.29 CHRONIC ABDOMINAL PAIN: Primary | ICD-10-CM

## 2022-10-03 DIAGNOSIS — R10.9 CHRONIC ABDOMINAL PAIN: Primary | ICD-10-CM

## 2022-10-03 DIAGNOSIS — K21.9 CHRONIC GERD: ICD-10-CM

## 2022-10-03 DIAGNOSIS — R11.0 CHRONIC NAUSEA: ICD-10-CM

## 2022-10-03 DIAGNOSIS — R93.89 ABNORMAL FINDING ON CT SCAN: ICD-10-CM

## 2022-10-03 PROCEDURE — 99214 OFFICE O/P EST MOD 30 MIN: CPT | Performed by: PHYSICIAN ASSISTANT

## 2022-10-03 NOTE — PROGRESS NOTES
"[facility]  [address]  [address]  [facility] [id number] [phone]        Patient Name:  Skye De Oliveira  :  1950      Date of Visit: 10/03/2022      Reason for Visit:   chronic abdominal pain, nausea    HPI: Skye De Oliveira is a 71 y.o. female s/p laparoscopic gastric bypass with a 120 cm Minerva limb/ HHR by Dr. Shan Booker at [facility] in [facility] 14, s/p lap elicia/ VLAD for partial SBO by Dr. Booker 14.    Transferred care to [facility] 2018 w/ numerous complaints.  Most noteworthy, c/o chronic nausea and abd.pain since POD#1.      Prior Eval included:  Bariatric labs 18 WNL.  H.Pylori UBT negative.  UGI/SBFT 18 - reflux, s/p RNY, o/w unremarkable.  EGJ 10/15/18 w/ Dr. Jurado - small pouch, essentially unremarkable.  CT ab/pel w/ IV/PO contrast 10/19/18 - suggestive of mild colitis, o/w unremarkable.  Patient declined further eval (w/ dx lap) or 2nd opinion.  Did not wish to discuss bypass reversal.      Returned to the office 2022 w/ ongoing issues/concerns - chronic nausea unchanged.  Reported isolated episode of acute epigastric pain 2022 - sharp, stabbing, no associated symptoms. PCP ordered CT Abd w/wo contrast 22 @[facility] (scanned into media) - showing \"Postsurgical changes proximal gastric region without acute inflammatory change however swirling in the right mesentery concerning for malrotation configuration of the small bowel and root of mesentery with moderate colonic stool burden.  No evidence for mechanical obstructive process however.\"    UGI/SBFT 22 @[facility] reads small sliding hiatal hernia, significant reflux, normal small bowel.     Continues to c/o chronic postprandial nausea and abd.bloating.  Has uncontrolled heartburn/reflux, since having surgery, despite taking BID Prilosec - \"eats renaldo-seltzer all day long.\"  Has frequent dysphagia, often has to stand up " or lean back to help her food pass through.  Notes unintentional weight loss (10+lbs), over the last 4 months.      Taking Tramadol daily (x 3 months) for arthritis pain.  Denies NSAIDS/steroids/tobacco.     Bariatric labs 9/2/22 - high potassium, high magnesium, high iron, high MMA, prealbumin 21.  Taking MVI + Vit D daily.  Finishing course of daily B12 injections.        Past Medical History:   Diagnosis Date   • Abnormal EKG 09/20/2022   • Adenomatous polyp of colon    • Age-related osteoporosis without current pathological fracture    • Anxiety    • Chronic kidney disease     STAGE 2   • Costochondral chest pain    • Depression    • DJD (degenerative joint disease)     MULTIPLE JOINTS   • GERD without esophagitis    • H/O mammogram 05/24/2016    [facility]   • High risk medication use    • Hypokalemia    • Major depression in remission (HCC)    • Primary insomnia    • Primary osteoarthritis involving multiple joints    • S/P bariatric surgery    • Thyroid nodule     MULTIPLE   • Vitamin D deficiency      Past Surgical History:   Procedure Laterality Date   • ABDOMINOPLASTY  2007   • APPENDECTOMY     • BLADDER REPAIR  2011    [facility]   • BREAST SURGERY  2008    REMOVED IMPLANTS AND REMOVED MORE FIROIDS   • CATARACT EXTRACTION     • CHOLECYSTECTOMY  2014    s/p lap elicia/ VLAD for partial SBO by Dr. Booker 2/17/14.   • COLONOSCOPY  2017   • GASTRIC BYPASS  2014    with a 120 cm Minerva limb/ HHR by Dr. Shan Booker at [facility] in [facility] 1/22/14   • HAMMER TOE REPAIR     • HIP ARTHROPLASTY  2020   • HYSTERECTOMY  1974    AND UILATERAL OOPHHORETOMY   • KNEE SURGERY  2010   • MASTOPEXY Bilateral 1982    WITH IMPLANT RECONSTRUCTION   • OVARY SURGERY Right 1998    REMOVED   • SHOULDER SURGERY Bilateral     ROTATOR CUFF  RIGHT 2000 LEFT 2001   • TONSILLECTOMY  1974    ADENOIODECTOMY   • VOCAL CORD BIOPSY  1992    POLYPS REMOVED     Outpatient Medications Marked as Taking for the 10/3/22 encounter  "(Telemedicine) with Peggy Peña PA   Medication Sig Dispense Refill   • Cholecalciferol (vitamin D3) 125 MCG (5000 UT) tablet tablet TAKE ONE TABLET BY MOUTH EVERY EVENING 30 tablet 5   • clonazePAM (KlonoPIN) 0.5 MG tablet Take 1 tablet by mouth At Night As Needed for Anxiety. 30 tablet 2   • multivitamin (THERAGRAN) tablet tablet Take 1 tablet by mouth Daily.     • omeprazole (priLOSEC) 40 MG capsule Take 1 capsule by mouth 2 (Two) Times a Day. 180 capsule 0   • raloxifene (EVISTA) 60 MG tablet      • traMADol (ULTRAM) 50 MG tablet Take 1 tablet by mouth Daily. 30 tablet 2       Allergies   Allergen Reactions   • Penicillins Hives     Tolerates cephalospins well         Social History     Socioeconomic History   • Marital status:    Tobacco Use   • Smoking status: Never Smoker   • Smokeless tobacco: Never Used   Vaping Use   • Vaping Use: Never used   Substance and Sexual Activity   • Alcohol use: No   • Drug use: No   • Sexual activity: Not Currently     Partners: Male     Comment: no hormones     Social History     Social History Narrative    Works part time [place] [employer]- . Lives with .        Review of Systems   General: denies fever, chills, unintentional weight loss   HEENT: denies nasal congestion, change in vision, ear pain, change in hearing, sore throat   CARDIAC: denies chest pain, palpitations, edema   RESP: denies shortness of breath, cough, wheezing   GI: positive for abdominal pain, nausea reflux, denies vomiting, diarrhea, constipation   Urinary: denies polyuria, dysuria, hematuria   MSK: denies joint pain, swelling, gout, joint stiffness   Neuro: denies seizures, weakness, numbness/tingling, LOC   Heme/Endo: denies bleeding, bruising, heat/cold intolerance, sweating   Psych: denies depression, anxiety    ROS reviewed - updated.     Ht 152.4 cm (60\")   Wt 49.9 kg (110 lb)   BMI 21.48 kg/m²     From LOV 9/16/22:   Physical Exam  HENT:      Head: " Normocephalic and atraumatic.   Cardiovascular:      Rate and Rhythm: Normal rate and regular rhythm.   Pulmonary:      Effort: Pulmonary effort is normal.      Breath sounds: Normal breath sounds.   Abdominal:      General: Bowel sounds are normal. There is no distension.      Palpations: Abdomen is soft. There is no mass.      Tenderness: There is no abdominal tenderness.   Skin:     General: Skin is warm and dry.   Neurological:      General: No focal deficit present.      Mental Status: She is alert and oriented to person, place, and time.   Psychiatric:         Mood and Affect: Mood normal.         Behavior: Behavior normal.           Assessment:  71 y.o. female s/p lap RNY/HHR 2014 w/ Dr. Booker in [place]      ICD-10-CM ICD-9-CM   1. Chronic abdominal pain  R10.9 789.00    G89.29 338.29   2. Chronic nausea  R11.0 787.02   3. Chronic GERD  K21.9 530.81   4. Abnormal finding on CT scan  R93.89 793.99       Plan:  Diagnostic laparoscopy w/ possible small bowel resection, laparoscopic recurrent hiatal hernia repair, possible upper endoscopy @[facility] w/ Dr. Jurado.  Avoid ASA/NSAIDS/tramadol x 1 week prior.     Risks/benefits discussed.  Patient understands there is no guarantee symptoms will be alleviated - willing to proceed.    Cardiac Clearance obtained 9/20/22 - low risk.        Note: This was an audio and video enabled telemedicine encounter conducted via [employer].  Patient is located in [place].  Provider is at her office.  Consent was obtained prior to the visit.

## 2022-10-03 NOTE — H&P (VIEW-ONLY)
"[facility]ariat[facility] Surgery  [address]  [address]  [place], located within [facility] [address] [phone]        Patient Name:  Skye De Oliveira  :  1950      Date of Visit: 10/03/2022      Reason for Visit:   chronic abdominal pain, nausea    HPI: Skye De Oliveira is a 71 y.o. female s/p laparoscopic gastric bypass with a 120 cm Minerva limb/ HHR by Dr. Shan Booker at [facility] in [facility] 14, s/p lap elicia/ VLAD for partial SBO by Dr. Booker 14.    Transferred care to [facility] 2018 w/ numerous complaints.  Most noteworthy, c/o chronic nausea and abd.pain since POD#1.      Prior Eval included:  Bariatric labs 18 WNL.  H.Pylori UBT negative.  UGI/SBFT 18 - reflux, s/p RNY, o/w unremarkable.  EGJ 10/15/18 w/ Dr. Jurado - small pouch, essentially unremarkable.  CT ab/pel w/ IV/PO contrast 10/19/18 - suggestive of mild colitis, o/w unremarkable.  Patient declined further eval (w/ dx lap) or 2nd opinion.  Did not wish to discuss bypass reversal.      Returned to the office 2022 w/ ongoing issues/concerns - chronic nausea unchanged.  Reported isolated episode of acute epigastric pain 2022 - sharp, stabbing, no associated symptoms. PCP ordered CT Abd w/wo contrast 22 @[facility] (scanned into media) - showing \"Postsurgical changes proximal gastric region without acute inflammatory change however swirling in the right mesentery concerning for malrotation configuration of the small bowel and root of mesentery with moderate colonic stool burden.  No evidence for mechanical obstructive process however.\"    UGI/SBFT 22 @[facility] reads small sliding hiatal hernia, significant reflux, normal small bowel.     Continues to c/o chronic postprandial nausea and abd.bloating.  Has uncontrolled heartburn/reflux, since having surgery, despite taking BID Prilosec - \"eats renaldo-seltzer all day long.\"  Has frequent dysphagia, often has to stand up " or lean back to help her food pass through.  Notes unintentional weight loss (10+lbs), over the last 4 months.      Taking Tramadol daily (x 3 months) for arthritis pain.  Denies NSAIDS/steroids/tobacco.     Bariatric labs 9/2/22 - high potassium, high magnesium, high iron, high MMA, prealbumin 21.  Taking MVI + Vit D daily.  Finishing course of daily B12 injections.        Past Medical History:   Diagnosis Date   • Abnormal EKG 09/20/2022   • Adenomatous polyp of colon    • Age-related osteoporosis without current pathological fracture    • Anxiety    • Chronic kidney disease     STAGE 2   • Costochondral chest pain    • Depression    • DJD (degenerative joint disease)     MULTIPLE JOINTS   • GERD without esophagitis    • H/O mammogram 05/24/2016    [facility]   • High risk medication use    • Hypokalemia    • Major depression in remission (HCC)    • Primary insomnia    • Primary osteoarthritis involving multiple joints    • S/P bariatric surgery    • Thyroid nodule     MULTIPLE   • Vitamin D deficiency      Past Surgical History:   Procedure Laterality Date   • ABDOMINOPLASTY  2007   • APPENDECTOMY     • BLADDER REPAIR  2011    [facility]   • BREAST SURGERY  2008    REMOVED IMPLANTS AND REMOVED MORE FIROIDS   • CATARACT EXTRACTION     • CHOLECYSTECTOMY  2014    s/p lap elicia/ VLAD for partial SBO by Dr. Booker 2/17/14.   • COLONOSCOPY  2017   • GASTRIC BYPASS  2014    with a 120 cm Minerva limb/ HHR by Dr. Shna Booker at [facility] in [facility] 1/22/14   • HAMMER TOE REPAIR     • HIP ARTHROPLASTY  2020   • HYSTERECTOMY  1974    AND UILATERAL OOPHHORETOMY   • KNEE SURGERY  2010   • MASTOPEXY Bilateral 1982    WITH IMPLANT RECONSTRUCTION   • OVARY SURGERY Right 1998    REMOVED   • SHOULDER SURGERY Bilateral     ROTATOR CUFF  RIGHT 2000 LEFT 2001   • TONSILLECTOMY  1974    ADENOIODECTOMY   • VOCAL CORD BIOPSY  1992    POLYPS REMOVED     Outpatient Medications Marked as Taking for the 10/3/22 encounter  "(Telemedicine) with Peggy Peña PA   Medication Sig Dispense Refill   • Cholecalciferol (vitamin D3) 125 MCG (5000 UT) tablet tablet TAKE ONE TABLET BY MOUTH EVERY EVENING 30 tablet 5   • clonazePAM (KlonoPIN) 0.5 MG tablet Take 1 tablet by mouth At Night As Needed for Anxiety. 30 tablet 2   • multivitamin (THERAGRAN) tablet tablet Take 1 tablet by mouth Daily.     • omeprazole (priLOSEC) 40 MG capsule Take 1 capsule by mouth 2 (Two) Times a Day. 180 capsule 0   • raloxifene (EVISTA) 60 MG tablet      • traMADol (ULTRAM) 50 MG tablet Take 1 tablet by mouth Daily. 30 tablet 2       Allergies   Allergen Reactions   • Penicillins Hives     Tolerates cephalospins well         Social History     Socioeconomic History   • Marital status:    Tobacco Use   • Smoking status: Never Smoker   • Smokeless tobacco: Never Used   Vaping Use   • Vaping Use: Never used   Substance and Sexual Activity   • Alcohol use: No   • Drug use: No   • Sexual activity: Not Currently     Partners: Male     Comment: no hormones     Social History     Social History Narrative    Works part time [place] [employer]- . Lives with .        Review of Systems   General: denies fever, chills, unintentional weight loss   HEENT: denies nasal congestion, change in vision, ear pain, change in hearing, sore throat   CARDIAC: denies chest pain, palpitations, edema   RESP: denies shortness of breath, cough, wheezing   GI: positive for abdominal pain, nausea reflux, denies vomiting, diarrhea, constipation   Urinary: denies polyuria, dysuria, hematuria   MSK: denies joint pain, swelling, gout, joint stiffness   Neuro: denies seizures, weakness, numbness/tingling, LOC   Heme/Endo: denies bleeding, bruising, heat/cold intolerance, sweating   Psych: denies depression, anxiety    ROS reviewed - updated.     Ht 152.4 cm (60\")   Wt 49.9 kg (110 lb)   BMI 21.48 kg/m²     From LOV 9/16/22:   Physical Exam  HENT:      Head: " Normocephalic and atraumatic.   Cardiovascular:      Rate and Rhythm: Normal rate and regular rhythm.   Pulmonary:      Effort: Pulmonary effort is normal.      Breath sounds: Normal breath sounds.   Abdominal:      General: Bowel sounds are normal. There is no distension.      Palpations: Abdomen is soft. There is no mass.      Tenderness: There is no abdominal tenderness.   Skin:     General: Skin is warm and dry.   Neurological:      General: No focal deficit present.      Mental Status: She is alert and oriented to person, place, and time.   Psychiatric:         Mood and Affect: Mood normal.         Behavior: Behavior normal.           Assessment:  71 y.o. female s/p lap RNY/HHR 2014 w/ Dr. Booker in [place]      ICD-10-CM ICD-9-CM   1. Chronic abdominal pain  R10.9 789.00    G89.29 338.29   2. Chronic nausea  R11.0 787.02   3. Chronic GERD  K21.9 530.81   4. Abnormal finding on CT scan  R93.89 793.99       Plan:  Diagnostic laparoscopy w/ possible small bowel resection, laparoscopic recurrent hiatal hernia repair, possible upper endoscopy @[facility] w/ Dr. Jurado.  Avoid ASA/NSAIDS/tramadol x 1 week prior.     Risks/benefits discussed.  Patient understands there is no guarantee symptoms will be alleviated - willing to proceed.    Cardiac Clearance obtained 9/20/22 - low risk.        Note: This was an audio and video enabled telemedicine encounter conducted via [employer].  Patient is located in [place].  Provider is at her office.  Consent was obtained prior to the visit.

## 2022-10-05 ENCOUNTER — TELEPHONE (OUTPATIENT)
Dept: BARIATRICS/WEIGHT MGMT | Facility: CLINIC | Age: 72
End: 2022-10-05

## 2022-10-07 ENCOUNTER — PRE-ADMISSION TESTING (OUTPATIENT)
Dept: PREADMISSION TESTING | Facility: HOSPITAL | Age: 72
End: 2022-10-07

## 2022-10-07 VITALS — BODY MASS INDEX: 21.62 KG/M2 | WEIGHT: 114.53 LBS | HEIGHT: 61 IN

## 2022-10-07 LAB
DEPRECATED RDW RBC AUTO: 46.5 FL (ref 37–54)
ERYTHROCYTE [DISTWIDTH] IN BLOOD BY AUTOMATED COUNT: 12.2 % (ref 12.3–15.4)
HBA1C MFR BLD: 5.4 % (ref 4.8–5.6)
HCT VFR BLD AUTO: 39.8 % (ref 34–46.6)
HGB BLD-MCNC: 13.1 G/DL (ref 12–15.9)
MCH RBC QN AUTO: 33.3 PG (ref 26.6–33)
MCHC RBC AUTO-ENTMCNC: 32.9 G/DL (ref 31.5–35.7)
MCV RBC AUTO: 101.3 FL (ref 79–97)
PLATELET # BLD AUTO: 225 10*3/MM3 (ref 140–450)
PMV BLD AUTO: 9.7 FL (ref 6–12)
RBC # BLD AUTO: 3.93 10*6/MM3 (ref 3.77–5.28)
WBC NRBC COR # BLD: 5.2 10*3/MM3 (ref 3.4–10.8)

## 2022-10-07 PROCEDURE — 83036 HEMOGLOBIN GLYCOSYLATED A1C: CPT

## 2022-10-07 PROCEDURE — 36415 COLL VENOUS BLD VENIPUNCTURE: CPT

## 2022-10-07 PROCEDURE — 85027 COMPLETE CBC AUTOMATED: CPT

## 2022-10-07 RX ORDER — CHOLECALCIFEROL (VITAMIN D3) 125 MCG
5 CAPSULE ORAL NIGHTLY PRN
COMMUNITY

## 2022-10-07 NOTE — DISCHARGE INSTRUCTIONS
Patient to apply Chlorhexadine wipes  to surgical area (as instructed) the night before procedure and the AM of procedure. Wipes provided.     Patient instructed to drink 20 ounces of Gatorade and it needs to be completed 1 hour (for Main OR patients) or 2 hours (scheduled  section & [facility] patients) before given arrival time for procedure (NO RED Gatorade)    Patient verbalized understanding.     Patient viewed general PAT education video as instructed in their preoperative information received from their surgeon.  Patient stated the general PAT education video was viewed in its entirety and survey completed.  Copies of PAT general education handouts (Incentive Spirometry, Meds to Beds Program, Patient Belongings, Pre-op skin preparation instructions, Blood Glucose testing, Visitor policy, Surgery FAQ, Code H) distributed to patient if not printed. Education related to the PAT pass and skin preparation for surgery (if applicable) completed in PAT as a reinforcement to PAT education video. Patient instructed to return PAT pass provided today as well as completed skin preparation sheet (if applicable) on the day of procedure.     Additionally if patient had not viewed video yet but intended to view it at home or in our waiting area, then referred them to the handout with QR code/link provided during PAT visit.  Instructed patient to complete survey after viewing the video in its entirety.  Encouraged patient/family to read PAT general education handouts thoroughly and notify PAT staff with any questions or concerns. Patient verbalized understanding of all information and priority content.

## 2022-10-07 NOTE — PAT
Permit not signed in PAT. After testing , Cisco Peña called pt and told her she needed Hiatal hernia repair and possible EGD. ( see cisco's note) . This will need to be added to permit and procedure schedule. Message left for Dr Jurado surgery scheduler. Pt will need to sign permit in preop.    Patient denies any current skin issues.     An arrival time for procedure was not provided during PAT visit. If patient had any questions or concerns about their arrival time, they were instructed to contact their surgeon/physician.  Additionally, if the patient referred to an arrival time that was acquired from their my chart account, patient was encouraged to verify that time with their surgeon/physician. Arrival times are NOT provided in Pre Admission Testing Department.    Patient to apply Chlorhexadine wipes  to surgical area (as instructed) the night before procedure and the AM of procedure. Wipes provided.    Patient instructed to drink 20 ounces of Gatorade and it needs to be completed 1 hour (for Main OR patients) or 2 hours (scheduled  section & [facility] patients) before given arrival time for procedure (NO RED Gatorade)    Patient verbalized understanding.

## 2022-10-19 ENCOUNTER — ANESTHESIA EVENT CONVERTED (OUTPATIENT)
Dept: ANESTHESIOLOGY | Facility: HOSPITAL | Age: 72
End: 2022-10-19

## 2022-10-19 ENCOUNTER — HOSPITAL ENCOUNTER (OUTPATIENT)
Facility: HOSPITAL | Age: 72
LOS: 1 days | Discharge: HOME | End: 2022-10-20
Attending: SURGERY | Admitting: SURGERY

## 2022-10-19 ENCOUNTER — ANESTHESIA EVENT (OUTPATIENT)
Dept: PERIOP | Facility: HOSPITAL | Age: 72
End: 2022-10-19

## 2022-10-19 ENCOUNTER — ANESTHESIA (OUTPATIENT)
Dept: PERIOP | Facility: HOSPITAL | Age: 72
End: 2022-10-19

## 2022-10-19 DIAGNOSIS — R10.84 GENERALIZED ABDOMINAL PAIN: ICD-10-CM

## 2022-10-19 DIAGNOSIS — K21.9 HIATAL HERNIA WITH GASTROESOPHAGEAL REFLUX: Primary | ICD-10-CM

## 2022-10-19 DIAGNOSIS — K44.9 HIATAL HERNIA WITH GASTROESOPHAGEAL REFLUX: Primary | ICD-10-CM

## 2022-10-19 LAB — POTASSIUM SERPL-SCNC: 5.5 MMOL/L (ref 3.5–5.2)

## 2022-10-19 PROCEDURE — 25010000002 HYDROMORPHONE 1 MG/ML SOLUTION: Performed by: SURGERY

## 2022-10-19 PROCEDURE — 44238 UNLISTED LAPS PX INTESTINE: CPT | Performed by: SURGERY

## 2022-10-19 PROCEDURE — 25010000002 HYDROMORPHONE PER 4 MG: Performed by: NURSE ANESTHETIST, CERTIFIED REGISTERED

## 2022-10-19 PROCEDURE — 25010000002 PROPOFOL 10 MG/ML EMULSION: Performed by: NURSE ANESTHETIST, CERTIFIED REGISTERED

## 2022-10-19 PROCEDURE — 84132 ASSAY OF SERUM POTASSIUM: CPT | Performed by: SURGERY

## 2022-10-19 PROCEDURE — 63710000001 PROMETHAZINE PER 25 MG

## 2022-10-19 PROCEDURE — 25010000002 FENTANYL CITRATE (PF) 50 MCG/ML SOLUTION: Performed by: NURSE ANESTHETIST, CERTIFIED REGISTERED

## 2022-10-19 PROCEDURE — 25010000002 ONDANSETRON PER 1 MG

## 2022-10-19 PROCEDURE — 43280 LAPAROSCOPY FUNDOPLASTY: CPT | Performed by: SURGERY

## 2022-10-19 PROCEDURE — 25010000002 HYDROMORPHONE 1 MG/ML SOLUTION

## 2022-10-19 PROCEDURE — 25010000002 DEXAMETHASONE SODIUM PHOSPHATE 10 MG/ML SOLUTION: Performed by: NURSE ANESTHETIST, CERTIFIED REGISTERED

## 2022-10-19 PROCEDURE — 25010000002 ONDANSETRON PER 1 MG: Performed by: NURSE ANESTHETIST, CERTIFIED REGISTERED

## 2022-10-19 PROCEDURE — 94799 UNLISTED PULMONARY SVC/PX: CPT

## 2022-10-19 PROCEDURE — 25010000002 FENTANYL CITRATE (PF) 50 MCG/ML SOLUTION

## 2022-10-19 PROCEDURE — 25010000002 CEFAZOLIN PER 500 MG: Performed by: SURGERY

## 2022-10-19 PROCEDURE — 25010000002 ONDANSETRON PER 1 MG: Performed by: SURGERY

## 2022-10-19 PROCEDURE — C1889 IMPLANT/INSERT DEVICE, NOC: HCPCS | Performed by: SURGERY

## 2022-10-19 DEVICE — IMPLANTABLE DEVICE: Type: IMPLANTABLE DEVICE | Site: ABDOMEN | Status: FUNCTIONAL

## 2022-10-19 DEVICE — ABSORBABLE WOUND CLOSURE DEVICE
Type: IMPLANTABLE DEVICE | Site: ABDOMEN | Status: FUNCTIONAL
Brand: SYNETURE

## 2022-10-19 DEVICE — ENDOPATH ECHELON ENDOSCOPIC LINEAR CUTTER RELOADS, GREEN, 60MM
Type: IMPLANTABLE DEVICE | Site: ABDOMEN | Status: FUNCTIONAL
Brand: ECHELON ENDOPATH

## 2022-10-19 RX ORDER — BUPIVACAINE HCL/0.9 % NACL/PF 0.125 %
PLASTIC BAG, INJECTION (ML) EPIDURAL AS NEEDED
Status: DISCONTINUED | OUTPATIENT
Start: 2022-10-19 | End: 2022-10-19 | Stop reason: SURG

## 2022-10-19 RX ORDER — ACETAMINOPHEN 500 MG
1000 TABLET ORAL EVERY 8 HOURS SCHEDULED
Status: DISCONTINUED | OUTPATIENT
Start: 2022-10-19 | End: 2022-10-20 | Stop reason: HOSPADM

## 2022-10-19 RX ORDER — DIPHENHYDRAMINE HYDROCHLORIDE 50 MG/ML
25 INJECTION INTRAMUSCULAR; INTRAVENOUS EVERY 4 HOURS PRN
Status: DISCONTINUED | OUTPATIENT
Start: 2022-10-19 | End: 2022-10-20 | Stop reason: HOSPADM

## 2022-10-19 RX ORDER — ONDANSETRON 2 MG/ML
INJECTION INTRAMUSCULAR; INTRAVENOUS
Status: COMPLETED
Start: 2022-10-19 | End: 2022-10-19

## 2022-10-19 RX ORDER — PROPOFOL 10 MG/ML
VIAL (ML) INTRAVENOUS AS NEEDED
Status: DISCONTINUED | OUTPATIENT
Start: 2022-10-19 | End: 2022-10-19 | Stop reason: SURG

## 2022-10-19 RX ORDER — ONDANSETRON 2 MG/ML
INJECTION INTRAMUSCULAR; INTRAVENOUS AS NEEDED
Status: DISCONTINUED | OUTPATIENT
Start: 2022-10-19 | End: 2022-10-19 | Stop reason: SURG

## 2022-10-19 RX ORDER — DEXAMETHASONE SODIUM PHOSPHATE 10 MG/ML
INJECTION, SOLUTION INTRAMUSCULAR; INTRAVENOUS AS NEEDED
Status: DISCONTINUED | OUTPATIENT
Start: 2022-10-19 | End: 2022-10-19 | Stop reason: SURG

## 2022-10-19 RX ORDER — CEFAZOLIN SODIUM 2 G/100ML
2 INJECTION, SOLUTION INTRAVENOUS EVERY 8 HOURS
Status: DISCONTINUED | OUTPATIENT
Start: 2022-10-19 | End: 2022-10-19

## 2022-10-19 RX ORDER — LIDOCAINE HYDROCHLORIDE 10 MG/ML
0.5 INJECTION, SOLUTION EPIDURAL; INFILTRATION; INTRACAUDAL; PERINEURAL ONCE AS NEEDED
Status: COMPLETED | OUTPATIENT
Start: 2022-10-19 | End: 2022-10-19

## 2022-10-19 RX ORDER — SODIUM CHLORIDE AND POTASSIUM CHLORIDE 150; 450 MG/100ML; MG/100ML
125 INJECTION, SOLUTION INTRAVENOUS CONTINUOUS
Status: DISCONTINUED | OUTPATIENT
Start: 2022-10-19 | End: 2022-10-19

## 2022-10-19 RX ORDER — CYANOCOBALAMIN 1000 UG/ML
1000 INJECTION, SOLUTION INTRAMUSCULAR; SUBCUTANEOUS ONCE
Status: COMPLETED | OUTPATIENT
Start: 2022-10-20 | End: 2022-10-20

## 2022-10-19 RX ORDER — SODIUM CHLORIDE 0.9 % (FLUSH) 0.9 %
10 SYRINGE (ML) INJECTION AS NEEDED
Status: DISCONTINUED | OUTPATIENT
Start: 2022-10-19 | End: 2022-10-19 | Stop reason: HOSPADM

## 2022-10-19 RX ORDER — ONDANSETRON 2 MG/ML
4 INJECTION INTRAMUSCULAR; INTRAVENOUS ONCE AS NEEDED
Status: COMPLETED | OUTPATIENT
Start: 2022-10-19 | End: 2022-10-19

## 2022-10-19 RX ORDER — CHOLECALCIFEROL (VITAMIN D3) 125 MCG
5 CAPSULE ORAL NIGHTLY PRN
Status: DISCONTINUED | OUTPATIENT
Start: 2022-10-19 | End: 2022-10-20 | Stop reason: HOSPADM

## 2022-10-19 RX ORDER — FENTANYL CITRATE 50 UG/ML
50 INJECTION, SOLUTION INTRAMUSCULAR; INTRAVENOUS
Status: DISCONTINUED | OUTPATIENT
Start: 2022-10-19 | End: 2022-10-19 | Stop reason: HOSPADM

## 2022-10-19 RX ORDER — BUPIVACAINE HYDROCHLORIDE 2.5 MG/ML
INJECTION, SOLUTION EPIDURAL; INFILTRATION; INTRACAUDAL
Status: COMPLETED | OUTPATIENT
Start: 2022-10-19 | End: 2022-10-19

## 2022-10-19 RX ORDER — LORAZEPAM 1 MG/1
1 TABLET ORAL EVERY 12 HOURS PRN
Status: DISCONTINUED | OUTPATIENT
Start: 2022-10-19 | End: 2022-10-20 | Stop reason: HOSPADM

## 2022-10-19 RX ORDER — MAGNESIUM HYDROXIDE 1200 MG/15ML
LIQUID ORAL AS NEEDED
Status: DISCONTINUED | OUTPATIENT
Start: 2022-10-19 | End: 2022-10-19 | Stop reason: HOSPADM

## 2022-10-19 RX ORDER — ALBUTEROL SULFATE 2.5 MG/3ML
2.5 SOLUTION RESPIRATORY (INHALATION) EVERY 4 HOURS PRN
Status: DISCONTINUED | OUTPATIENT
Start: 2022-10-19 | End: 2022-10-20 | Stop reason: HOSPADM

## 2022-10-19 RX ORDER — OXYCODONE HYDROCHLORIDE 5 MG/1
5 TABLET ORAL EVERY 6 HOURS PRN
Status: DISCONTINUED | OUTPATIENT
Start: 2022-10-19 | End: 2022-10-20 | Stop reason: HOSPADM

## 2022-10-19 RX ORDER — DEXAMETHASONE SODIUM PHOSPHATE 10 MG/ML
INJECTION, SOLUTION INTRAMUSCULAR; INTRAVENOUS
Status: COMPLETED | OUTPATIENT
Start: 2022-10-19 | End: 2022-10-19

## 2022-10-19 RX ORDER — SIMETHICONE 80 MG
80 TABLET,CHEWABLE ORAL 4 TIMES DAILY PRN
Status: DISCONTINUED | OUTPATIENT
Start: 2022-10-19 | End: 2022-10-20 | Stop reason: HOSPADM

## 2022-10-19 RX ORDER — SODIUM CHLORIDE 0.9 % (FLUSH) 0.9 %
10 SYRINGE (ML) INJECTION EVERY 12 HOURS SCHEDULED
Status: DISCONTINUED | OUTPATIENT
Start: 2022-10-19 | End: 2022-10-19 | Stop reason: HOSPADM

## 2022-10-19 RX ORDER — CEFAZOLIN SODIUM IN 0.9 % NACL 2 G/100 ML
2 PLASTIC BAG, INJECTION (ML) INTRAVENOUS
Status: COMPLETED | OUTPATIENT
Start: 2022-10-19 | End: 2022-10-19

## 2022-10-19 RX ORDER — SODIUM CHLORIDE 450 MG/100ML
125 INJECTION, SOLUTION INTRAVENOUS CONTINUOUS
Status: DISCONTINUED | OUTPATIENT
Start: 2022-10-19 | End: 2022-10-20 | Stop reason: HOSPADM

## 2022-10-19 RX ORDER — FENTANYL CITRATE 50 UG/ML
INJECTION, SOLUTION INTRAMUSCULAR; INTRAVENOUS
Status: COMPLETED
Start: 2022-10-19 | End: 2022-10-19

## 2022-10-19 RX ORDER — TRAMADOL HYDROCHLORIDE 50 MG/1
50 TABLET ORAL DAILY
Status: DISCONTINUED | OUTPATIENT
Start: 2022-10-19 | End: 2022-10-20 | Stop reason: HOSPADM

## 2022-10-19 RX ORDER — PROMETHAZINE HYDROCHLORIDE 12.5 MG/1
12.5 TABLET ORAL EVERY 6 HOURS PRN
Status: DISCONTINUED | OUTPATIENT
Start: 2022-10-19 | End: 2022-10-20 | Stop reason: HOSPADM

## 2022-10-19 RX ORDER — ACETAMINOPHEN 160 MG/5ML
1000 SOLUTION ORAL EVERY 8 HOURS SCHEDULED
Status: DISCONTINUED | OUTPATIENT
Start: 2022-10-19 | End: 2022-10-20 | Stop reason: HOSPADM

## 2022-10-19 RX ORDER — NALOXONE HCL 0.4 MG/ML
0.4 VIAL (ML) INJECTION
Status: DISCONTINUED | OUTPATIENT
Start: 2022-10-19 | End: 2022-10-20 | Stop reason: HOSPADM

## 2022-10-19 RX ORDER — ALPRAZOLAM 0.25 MG/1
0.25 TABLET ORAL ONCE AS NEEDED
Status: DISCONTINUED | OUTPATIENT
Start: 2022-10-19 | End: 2022-10-20 | Stop reason: HOSPADM

## 2022-10-19 RX ORDER — GABAPENTIN 100 MG/1
100 CAPSULE ORAL 3 TIMES DAILY
Status: DISCONTINUED | OUTPATIENT
Start: 2022-10-19 | End: 2022-10-20 | Stop reason: HOSPADM

## 2022-10-19 RX ORDER — ROCURONIUM BROMIDE 10 MG/ML
INJECTION, SOLUTION INTRAVENOUS AS NEEDED
Status: DISCONTINUED | OUTPATIENT
Start: 2022-10-19 | End: 2022-10-19 | Stop reason: SURG

## 2022-10-19 RX ORDER — FAMOTIDINE 20 MG/1
20 TABLET, FILM COATED ORAL
Status: COMPLETED | OUTPATIENT
Start: 2022-10-19 | End: 2022-10-19

## 2022-10-19 RX ORDER — SODIUM CHLORIDE 9 MG/ML
150 INJECTION, SOLUTION INTRAVENOUS CONTINUOUS
Status: DISCONTINUED | OUTPATIENT
Start: 2022-10-19 | End: 2022-10-19

## 2022-10-19 RX ORDER — HYDROMORPHONE HYDROCHLORIDE 2 MG/1
2 TABLET ORAL EVERY 4 HOURS PRN
Status: DISCONTINUED | OUTPATIENT
Start: 2022-10-19 | End: 2022-10-20 | Stop reason: HOSPADM

## 2022-10-19 RX ORDER — LORAZEPAM 2 MG/ML
0.5 INJECTION INTRAMUSCULAR EVERY 12 HOURS PRN
Status: DISCONTINUED | OUTPATIENT
Start: 2022-10-19 | End: 2022-10-20 | Stop reason: HOSPADM

## 2022-10-19 RX ORDER — BUPIVACAINE HYDROCHLORIDE AND EPINEPHRINE 5; 5 MG/ML; UG/ML
INJECTION, SOLUTION PERINEURAL AS NEEDED
Status: DISCONTINUED | OUTPATIENT
Start: 2022-10-19 | End: 2022-10-19 | Stop reason: HOSPADM

## 2022-10-19 RX ORDER — PANTOPRAZOLE SODIUM 40 MG/10ML
40 INJECTION, POWDER, LYOPHILIZED, FOR SOLUTION INTRAVENOUS
Status: DISCONTINUED | OUTPATIENT
Start: 2022-10-20 | End: 2022-10-20 | Stop reason: HOSPADM

## 2022-10-19 RX ORDER — NALOXONE HCL 0.4 MG/ML
0.1 VIAL (ML) INJECTION
Status: DISCONTINUED | OUTPATIENT
Start: 2022-10-19 | End: 2022-10-20 | Stop reason: HOSPADM

## 2022-10-19 RX ORDER — HYDROMORPHONE HYDROCHLORIDE 1 MG/ML
0.5 INJECTION, SOLUTION INTRAMUSCULAR; INTRAVENOUS; SUBCUTANEOUS
Status: DISCONTINUED | OUTPATIENT
Start: 2022-10-19 | End: 2022-10-19 | Stop reason: HOSPADM

## 2022-10-19 RX ORDER — ACETAMINOPHEN 500 MG
1000 TABLET ORAL ONCE
Status: COMPLETED | OUTPATIENT
Start: 2022-10-19 | End: 2022-10-19

## 2022-10-19 RX ORDER — PROCHLORPERAZINE MALEATE 10 MG
10 TABLET ORAL EVERY 6 HOURS PRN
Status: DISCONTINUED | OUTPATIENT
Start: 2022-10-19 | End: 2022-10-20 | Stop reason: HOSPADM

## 2022-10-19 RX ORDER — RALOXIFENE HYDROCHLORIDE 60 MG/1
60 TABLET, FILM COATED ORAL DAILY
Status: DISCONTINUED | OUTPATIENT
Start: 2022-10-20 | End: 2022-10-20 | Stop reason: HOSPADM

## 2022-10-19 RX ORDER — PROMETHAZINE HYDROCHLORIDE 25 MG/1
TABLET ORAL
Status: COMPLETED
Start: 2022-10-19 | End: 2022-10-19

## 2022-10-19 RX ORDER — MORPHINE SULFATE 4 MG/ML
4 INJECTION, SOLUTION INTRAMUSCULAR; INTRAVENOUS
Status: DISCONTINUED | OUTPATIENT
Start: 2022-10-19 | End: 2022-10-20 | Stop reason: HOSPADM

## 2022-10-19 RX ORDER — GABAPENTIN 250 MG/5ML
100 SOLUTION ORAL 3 TIMES DAILY
Status: DISCONTINUED | OUTPATIENT
Start: 2022-10-19 | End: 2022-10-20 | Stop reason: HOSPADM

## 2022-10-19 RX ORDER — ONDANSETRON 2 MG/ML
4 INJECTION INTRAMUSCULAR; INTRAVENOUS EVERY 4 HOURS PRN
Status: DISCONTINUED | OUTPATIENT
Start: 2022-10-19 | End: 2022-10-20 | Stop reason: HOSPADM

## 2022-10-19 RX ORDER — FENTANYL CITRATE 50 UG/ML
INJECTION, SOLUTION INTRAMUSCULAR; INTRAVENOUS AS NEEDED
Status: DISCONTINUED | OUTPATIENT
Start: 2022-10-19 | End: 2022-10-19 | Stop reason: SURG

## 2022-10-19 RX ORDER — CLONAZEPAM 0.5 MG/1
0.5 TABLET ORAL NIGHTLY PRN
Status: DISCONTINUED | OUTPATIENT
Start: 2022-10-19 | End: 2022-10-20 | Stop reason: HOSPADM

## 2022-10-19 RX ORDER — ONDANSETRON 4 MG/1
4 TABLET, FILM COATED ORAL EVERY 4 HOURS PRN
Status: DISCONTINUED | OUTPATIENT
Start: 2022-10-19 | End: 2022-10-20 | Stop reason: HOSPADM

## 2022-10-19 RX ORDER — LIDOCAINE HYDROCHLORIDE 10 MG/ML
INJECTION, SOLUTION EPIDURAL; INFILTRATION; INTRACAUDAL; PERINEURAL AS NEEDED
Status: DISCONTINUED | OUTPATIENT
Start: 2022-10-19 | End: 2022-10-19 | Stop reason: SURG

## 2022-10-19 RX ORDER — BUPIVACAINE HCL/0.9 % NACL/PF 0.1 %
2 PLASTIC BAG, INJECTION (ML) EPIDURAL EVERY 8 HOURS
Status: COMPLETED | OUTPATIENT
Start: 2022-10-19 | End: 2022-10-20

## 2022-10-19 RX ORDER — ONDANSETRON 4 MG/1
4 TABLET, FILM COATED ORAL EVERY 6 HOURS PRN
Status: DISCONTINUED | OUTPATIENT
Start: 2022-10-23 | End: 2022-10-20 | Stop reason: HOSPADM

## 2022-10-19 RX ORDER — HYDRALAZINE HYDROCHLORIDE 20 MG/ML
10 INJECTION INTRAMUSCULAR; INTRAVENOUS
Status: DISCONTINUED | OUTPATIENT
Start: 2022-10-19 | End: 2022-10-20 | Stop reason: HOSPADM

## 2022-10-19 RX ADMIN — SODIUM CHLORIDE: 9 INJECTION, SOLUTION INTRAVENOUS at 08:34

## 2022-10-19 RX ADMIN — SODIUM CHLORIDE 500 ML: 9 INJECTION, SOLUTION INTRAVENOUS at 06:36

## 2022-10-19 RX ADMIN — ONDANSETRON 4 MG: 2 INJECTION INTRAMUSCULAR; INTRAVENOUS at 21:03

## 2022-10-19 RX ADMIN — FAMOTIDINE 20 MG: 20 TABLET ORAL at 06:35

## 2022-10-19 RX ADMIN — HYDROMORPHONE HYDROCHLORIDE 0.5 MG: 1 INJECTION, SOLUTION INTRAMUSCULAR; INTRAVENOUS; SUBCUTANEOUS at 10:34

## 2022-10-19 RX ADMIN — CEFAZOLIN 2 G: 10 INJECTION, POWDER, FOR SOLUTION INTRAVENOUS at 07:34

## 2022-10-19 RX ADMIN — HYDROMORPHONE HYDROCHLORIDE 1 MG: 1 INJECTION, SOLUTION INTRAMUSCULAR; INTRAVENOUS; SUBCUTANEOUS at 15:48

## 2022-10-19 RX ADMIN — Medication 200 MCG: at 07:47

## 2022-10-19 RX ADMIN — GABAPENTIN 100 MG: 100 CAPSULE ORAL at 20:30

## 2022-10-19 RX ADMIN — PROPOFOL 20 MG: 10 INJECTION, EMULSION INTRAVENOUS at 09:29

## 2022-10-19 RX ADMIN — ONDANSETRON 4 MG: 2 INJECTION INTRAMUSCULAR; INTRAVENOUS at 10:24

## 2022-10-19 RX ADMIN — FENTANYL CITRATE 50 MCG: 50 INJECTION, SOLUTION INTRAMUSCULAR; INTRAVENOUS at 10:56

## 2022-10-19 RX ADMIN — PROMETHAZINE HYDROCHLORIDE 12.5 MG: 12.5 TABLET ORAL at 10:28

## 2022-10-19 RX ADMIN — Medication 100 MCG: at 09:01

## 2022-10-19 RX ADMIN — PROMETHAZINE HYDROCHLORIDE 12.5 MG: 25 TABLET ORAL at 10:28

## 2022-10-19 RX ADMIN — Medication 200 MCG: at 07:55

## 2022-10-19 RX ADMIN — DEXAMETHASONE SODIUM PHOSPHATE 4 MG: 10 INJECTION, SOLUTION INTRAMUSCULAR; INTRAVENOUS at 07:38

## 2022-10-19 RX ADMIN — LIDOCAINE HYDROCHLORIDE 0.5 ML: 10 INJECTION, SOLUTION EPIDURAL; INFILTRATION; INTRACAUDAL; PERINEURAL at 06:36

## 2022-10-19 RX ADMIN — ROCURONIUM BROMIDE 20 MG: 10 INJECTION, SOLUTION INTRAVENOUS at 07:59

## 2022-10-19 RX ADMIN — ROCURONIUM BROMIDE 50 MG: 10 INJECTION, SOLUTION INTRAVENOUS at 07:34

## 2022-10-19 RX ADMIN — ACETAMINOPHEN 1000 MG: 500 TABLET ORAL at 20:30

## 2022-10-19 RX ADMIN — PROPOFOL 20 MG: 10 INJECTION, EMULSION INTRAVENOUS at 09:24

## 2022-10-19 RX ADMIN — ROCURONIUM BROMIDE 10 MG: 10 INJECTION, SOLUTION INTRAVENOUS at 08:44

## 2022-10-19 RX ADMIN — BUPIVACAINE HYDROCHLORIDE 50 ML: 2.5 INJECTION, SOLUTION EPIDURAL; INFILTRATION; INTRACAUDAL at 07:38

## 2022-10-19 RX ADMIN — FENTANYL CITRATE 100 MCG: 50 INJECTION, SOLUTION INTRAMUSCULAR; INTRAVENOUS at 07:34

## 2022-10-19 RX ADMIN — Medication 100 MCG: at 07:42

## 2022-10-19 RX ADMIN — SODIUM CHLORIDE 125 ML/HR: 4.5 INJECTION, SOLUTION INTRAVENOUS at 16:39

## 2022-10-19 RX ADMIN — Medication 100 MCG: at 08:06

## 2022-10-19 RX ADMIN — HYDROMORPHONE HYDROCHLORIDE 0.5 MG: 1 INJECTION, SOLUTION INTRAMUSCULAR; INTRAVENOUS; SUBCUTANEOUS at 10:26

## 2022-10-19 RX ADMIN — SUGAMMADEX 200 MG: 100 INJECTION, SOLUTION INTRAVENOUS at 09:24

## 2022-10-19 RX ADMIN — Medication 100 MCG: at 08:26

## 2022-10-19 RX ADMIN — ONDANSETRON 4 MG: 2 INJECTION INTRAMUSCULAR; INTRAVENOUS at 07:34

## 2022-10-19 RX ADMIN — LIDOCAINE HYDROCHLORIDE 50 MG: 10 INJECTION, SOLUTION EPIDURAL; INFILTRATION; INTRACAUDAL; PERINEURAL at 07:34

## 2022-10-19 RX ADMIN — DEXAMETHASONE SODIUM PHOSPHATE 6 MG: 10 INJECTION, SOLUTION INTRAMUSCULAR; INTRAVENOUS at 07:34

## 2022-10-19 RX ADMIN — ACETAMINOPHEN 1000 MG: 500 TABLET, FILM COATED ORAL at 06:35

## 2022-10-19 RX ADMIN — ROCURONIUM BROMIDE 10 MG: 10 INJECTION, SOLUTION INTRAVENOUS at 09:01

## 2022-10-19 RX ADMIN — SODIUM CHLORIDE: 9 INJECTION, SOLUTION INTRAVENOUS at 07:34

## 2022-10-19 RX ADMIN — PROPOFOL 200 MG: 10 INJECTION, EMULSION INTRAVENOUS at 07:34

## 2022-10-19 RX ADMIN — FENTANYL CITRATE 100 MCG: 50 INJECTION, SOLUTION INTRAMUSCULAR; INTRAVENOUS at 09:32

## 2022-10-19 RX ADMIN — CEFAZOLIN 2 G: 10 INJECTION, POWDER, FOR SOLUTION INTRAVENOUS at 15:48

## 2022-10-19 RX ADMIN — ONDANSETRON 4 MG: 2 INJECTION INTRAMUSCULAR; INTRAVENOUS at 15:47

## 2022-10-19 RX ADMIN — ROCURONIUM BROMIDE 20 MG: 10 INJECTION, SOLUTION INTRAVENOUS at 08:15

## 2022-10-19 RX ADMIN — OXYCODONE 5 MG: 5 TABLET ORAL at 20:30

## 2022-10-19 RX ADMIN — Medication 100 MCG: at 08:44

## 2022-10-19 NOTE — ANESTHESIA PROCEDURE NOTES
"Peripheral Block      Patient reassessed immediately prior to procedure    Patient location during procedure: OR  Reason for block: at surgeon's request and post-op pain management  Performed by  CRNA/CAA: Regan Crum, CRNA  Preanesthetic Checklist  Completed: patient identified, IV checked, site marked, risks and benefits discussed, surgical consent, monitors and equipment checked, pre-op evaluation and timeout performed  Prep:  Pt Position: supine  Sterile barriers:cap, gloves, mask and washed/disinfected hands  Prep: ChloraPrep  Patient monitoring: blood pressure monitoring, continuous pulse oximetry and EKG  Procedure    Sedation: yes  Performed under: general  Guidance:ultrasound guided  Images:still images obtained, printed/placed on chart    Laterality:Bilateral  Block Type:TAP  Injection Technique:single-shot  Needle Type:short-bevel and echogenic  Needle Gauge:20 G  Resistance on Injection: none    Medications Used: bupivacaine PF (MARCAINE) 0.25 % injection - Injection   50 mL - 10/19/2022 7:38:00 AM  dexamethasone sodium phosphate injection - Injection   4 mg - 10/19/2022 7:38:00 AM      Medications  Comment:Block Injection:  LA dose divided between Right and Left block        Post Assessment  Injection Assessment: negative aspiration for heme, incremental injection and no paresthesia on injection  Patient Tolerance:comfortable throughout block  Complications:no  Additional Notes  Subcostal TAPs  A high-frequency linear transducer, with sterile cover, was placed sub-xiphoid to identify [place], right and left Rectus Abdominus Muscles (TORREY). The transducer was moved either right or left subcostally to identify the TORREY and the Transverse Abdominus Muscle (AIKEN). The insertion site was prepped in sterile fashion and then localized with 2-5 ml of 1% Lidocaine. Using ultrasound-guidance, a 20-gauge B-De Jesus 4\" Ultraplex 360 non-stimulating echogenic needle was advanced in plane, from medial to lateral, " "until the tip of the needle was in the fascial plane between the TORREY and AIKEN. 1-3ml of preservative free normal saline was used to hydro-dissect the fascial planes. After the fascial plane was verified, the local anesthetic (LA) was injected. The procedure was repeated on the opposite side for bilateral coverage. Aspiration every 5 ml to prevent intravascular injection. Injection was completed with negative aspiration of blood and negative intravascular injection. Injection pressures were normal with minimal resistance. The subcostal approach to the TAP nerve block ideally anesthetizes the intercostal nerves T6-T9.     Mid-Axillary/Lateral TAPs  A high-frequency linear transducer, with sterile cover, was placed in the midaxillary line between the ASIS and costal margin. The External Oblique Muscle (EOM), Internal Oblique Muscle (IOM), Transverse Abdominus Muscle (AIKEN), and Peritoneum were identified. The insertion site was prepped in sterile fashion and then localized with 2-5 ml of 1% Lidocaine. Using ultrasound-guidance, a 20-gauge B-De Jesus 4\" Ultraplex 360 non-stimulating echogenic needle was advanced in plane, from medial to lateral, until the tip of the needle was in the fascial plane between the IOM and AIKEN. 1-3ml of preservative free normal saline was used to hydro-dissect the fascial planes. After the fascial plane was verified, the local anesthetic (LA) was injected. The procedure was repeated on the opposite side for bilateral coverage. Aspiration every 5 ml to prevent intravascular injection. Injection was completed with negative aspiration of blood and negative intravascular injection. Injection pressures were normal with minimal resistance. Midaxillary TAPs should reach intercostal nerves T10- T11 and the subcostal nerve T12.           "

## 2022-10-19 NOTE — ANESTHESIA PREPROCEDURE EVALUATION
Anesthesia Evaluation     Patient summary reviewed and Nursing notes reviewed   history of anesthetic complications: PONV               Airway   Mallampati: II  TM distance: >3 FB  Neck ROM: full  No difficulty expected  Dental - normal exam     Pulmonary - negative pulmonary ROS and normal exam   Cardiovascular - negative cardio ROS and normal exam        Neuro/Psych  (+) psychiatric history Anxiety and Depression,    GI/Hepatic/Renal/Endo    (+)  GERD,  renal disease CRI, thyroid problem hypothyroidism    ROS Comment: H/O GASTRIC BYPASS    Musculoskeletal     Abdominal  - normal exam    Bowel sounds: normal.   Substance History - negative use     OB/GYN negative ob/gyn ROS         Other   arthritis,                      Anesthesia Plan    ASA 3     general     (TAPS FOR POSTOP PAIN)  intravenous induction     Anesthetic plan, risks, benefits, and alternatives have been provided, discussed and informed consent has been obtained with: patient.    Plan discussed with CRNA.        CODE STATUS:

## 2022-10-19 NOTE — PLAN OF CARE
Goal Outcome Evaluation:      Pt. Admitted to floor from PACU, VSS on RA, c/o pain/nausea controlled with prns, pt. Still quiote drowsy,  at bedside, will continue POC

## 2022-10-19 NOTE — ANESTHESIA PROCEDURE NOTES
Airway  Urgency: elective    Airway not difficult    General Information and Staff    Patient location during procedure: OR  CRNA/CAA: Regan Crum CRNA    Indications and Patient Condition  Indications for airway management: airway protection    Preoxygenated: yes  MILS not maintained throughout  Mask difficulty assessment: 0 - not attempted    Final Airway Details  Final airway type: endotracheal airway      Successful airway: ETT  Cuffed: yes   Successful intubation technique: direct laryngoscopy and RSI  Facilitating devices/methods: intubating stylet  Endotracheal tube insertion site: oral  Blade: Aldo  Blade size: 3  ETT size (mm): 6.5  Cormack-Lehane Classification: grade I - full view of glottis  Placement verified by: chest auscultation and capnometry   Measured from: lips  ETT/EBT  to lips (cm): 20  Number of attempts at approach: 1  Assessment: lips, teeth, and gum same as pre-op and atraumatic intubation    Additional Comments  Negative epigastric sounds, Breath sound equal bilaterally with symmetric chest rise and fall

## 2022-10-19 NOTE — BRIEF OP NOTE
HIATAL HERNIA REPAIR LAPAROSCOPIC, NISSEN FUNDOPLICATION LAPAROSCOPIC, ESOPHAGOGASTRODUODENOSCOPY  Progress Note    Skye De Oliveira  10/19/2022    Pre-op Diagnosis:   Generalized abdominal pain [R10.84]       Post-Op Diagnosis Codes:     * Generalized abdominal pain [R10.84]     * Hx of gastric bypass [Z98.84]     * Hiatal hernia with gastroesophageal reflux [K44.9, K21.9]     * Internal hernia [K45.8]    Procedure/CPT® Codes:  MO LAP,ESOPHAGOGAST FUNDOPLASTY [70728]  MO REDUCE VOLVULUS,INTUSS,INTERN HERNIA [[id number]]  MO ESOPHAGOGASTRODUODENOSCOPY TRANSORAL DIAGNOSTIC [[id number]]  REPAIR INTERNAL HERNIA LAPAROSCOPIC    Procedure(s):  HIATAL HERNIA REPAIR LAPAROSCOPIC  NISSEN FUNDOPLICATION LAPAROSCOPIC  REPAIR INTERNAL HERNIA LAPAROSCOPIC  ESOPHAGOGASTRODUODENOSCOPY        Surgeon(s):  Ton Jurado MD    Anesthesia: General with Block    Staff:   Circulator: Norah Flores RN  Scrub Person: Agueda Godwin  Nursing Assistant: Michelle Thomas CNA         Estimated Blood Loss: minimal    Urine Voided: * No values recorded between 10/19/2022  7:30 AM and 10/19/2022  9:42 AM *    Specimens:                None          Drains: * No LDAs found *    Findings:         Complications: none          Ton Jurado MD     Date: 10/19/2022  Time: 09:42 EDT

## 2022-10-19 NOTE — ANESTHESIA POSTPROCEDURE EVALUATION
Patient: Skye De Oliveira    Procedure Summary     Date: 10/19/22 Room / Location:  [facility]    Anesthesia Start: 0731 Anesthesia Stop: 0948    Procedures:       HIATAL HERNIA REPAIR LAPAROSCOPIC (Abdomen)      NISSEN FUNDOPLICATION LAPAROSCOPIC (Abdomen)      ESOPHAGOGASTRODUODENOSCOPY (Esophagus) Diagnosis:       Generalized abdominal pain      Hx of gastric bypass      Hiatal hernia with gastroesophageal reflux      Internal hernia      (Generalized abdominal pain [R10.84])    Surgeons: Ton Jurado MD Provider: Lopez Hanks MD    Anesthesia Type: general ASA Status: 3          Anesthesia Type: general    Vitals  Vitals Value Taken Time   BP     Temp     Pulse     Resp     SpO2 99 % 10/19/22 0948           Post Anesthesia Care and Evaluation    Patient location during evaluation: PACU  Patient participation: complete - patient participated  Level of consciousness: awake and alert  Pain management: adequate    Airway patency: patent  Anesthetic complications: No anesthetic complications  PONV Status: none  Cardiovascular status: hemodynamically stable and acceptable  Respiratory status: nonlabored ventilation, acceptable and nasal cannula  Hydration status: acceptable

## 2022-10-19 NOTE — OP NOTE
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 Booker, [facility]), status post laparoscopic cholecystectomy and lysis of adhesions for partial small bowel obstruction February 2014  (Dr. Booker, [facility])        Postoperative Diagnosis:         Same with moderate to large recurrent  hiatal hernia, large internal hernia through Morales's space involving the entire small bowel                           Procedure:                Laparoscopic recurrent hiatal hernia repair (not paraesophageal) with remnant Nissen fundoplication              Laparoscopic reduction and repair of internal hernia Morales's space   EGD                                                                                                                              Surgeon:                                                       MILLER Jurado MD     Anesthesia:                                                   GETA     EBL:                                                              Min     Fluids:                                                           Crystalloid     Specimens:                                                   None     Drains:                                                           None     Counts:                                                          Correct     Complications:                                               None     Indications:   This is a 71-year-old previously morbidly obese female status post laparoscopic Minerva-en-Y gastric bypass and hiatal hernia repair by Dr. Booker in [facility] on January 22, 2014.  She was returned to the operating room approximately 3 weeks later and underwent laparoscopic cholecystectomy and lysis of adhesions for partial small bowel  obstruction.  She says for the last 9 years she has been miserable with GI complaints and had extensive work-ups.  In 2018 she had upper GI with small bowel follow-through that showed reflux otherwise unremarkable.  I performed upper endoscopy in 2018 showing a small pouch otherwise unremarkable.  CT scan in 2018 was unremarkable.  She was offered diagnostic laparoscopy or second opinion and she declined.  She returned last month with ongoing issues and concerns unchanged.  A CT scan had been performed in June at [facility] showing swirling in the right mesentery concerning for malrotation of the small bowel.  Repeat upper GI with small bowel follow-through showed a small sliding hiatal hernia and significant reflux.  She continues to have uncontrolled heartburn and reflux despite twice daily Prilosec and takes Shawna-Swanquarter in addition, has frequent dysphagia, and has had an unintentional weight loss of over 10 pounds in last 4 months.  Takes daily tramadol.  Please see our office notes risks, benefits, and alternative therapies were discussed and she wished to proceed with diagnostic laparoscopy, possible small bowel resection possible laparoscopic recurrent hiatal hernia repair, possible upper endoscopy.  Current BMI is 21.64.  She is status post abdominoplasty.     Operative technique:      The patient was brought to the operating room, and placed supine upon the operating room table.  SCD hose were placed, she underwent uneventful general endotracheal anesthesia per the anesthesiology staff, she received IV Ancef (her penicillin allergy noted), the anesthesiology staff performed a tap block, and her abdomen was prepped and draped with ChloraPrep in a sterile fashion, an Ioban was used as well, a Briseno catheter was not placed.    The peritoneal cavity was entered above into the left of the umbilicus using a 5 mm trocar utilizing an Optiview technique and the abdomen was insufflated to a  pressure of 12 mmHg CO2 gas.  Exploratory laparoscopy revealed no evidence of injury from the entrance technique, normal-appearing left lobe of the liver, status postcholecystectomy, nondilated antecolic Minerva limb and distal gastric remnant.    Remaining trocars were placed under direct visualization including 2 additional 5 mm trochars on the left, 1 on the right.    Through a stab incision in the epigastrium a Homar retractor was placed and used to elevate the left lobe of the liver.    Some adhesions with large clips were divided to the undersurface of the left lobe of the liver exposing the hiatus where there was a moderate to large recurrent hiatal hernia.  It appeared that a replaced hepatic vessel had been divided between several large clips on both sides.  Several laparoscopic photos taken throughout the procedure.  The hiatal hernia was mostly anterior.    Adhesions of the base of the right parag to the caudate lobe were divided with the Enseal device.  The hernia sac was incised along the base of the right parag and extended up and across the phrenoesophageal membrane.  Omental adhesions to the gastrojejunostomy were divided and the superior gastric pouch was dissected free from the left parag.  I could not identify any previous crural sutures.  The hernia sac was incised along the base of the left parag and this was also extended up and across the phrenoesophageal membrane.  The hernia sac and it's contents were dissected out of the mediastinum and reduced below the level of the crura.    There was not a paraesophageal component.  The GE junction was then lengthened to well below the level of the crura by dissecting areolar tissue well into the mediastinum.  A penrose drain was used temporarily for esophageal retraction. The anterior and posterior vagus nerves were preserved.    The crura were dissected to their meeting point inferiorly.  The hiatal hernia repair was then performed posteriorly using a  "running nonabsorbable 2-0 V-loc suture.  The same suture was used to place an anterior interrupted crural stitch.  Fascia came together fairly nicely without tension and there was good fascia on each parag but I felt the left parag was attenuated.  I did not feel mesh would add much benefit but I did feel given the size of the hernia that a remnant Nissen fundoplication would be of benefit.    Short gastric vessels were divided beginning approximately a third of the way around the greater curvature.  The superior gastric pouch was noted to be intimately adherent to the cardia/fundus of the remnant.  I felt the best course of action would be to divide this area with a stapler.  Under direct visualization the right lateral abdominal trocar was changed out to a 12 mm trocar in this area was divided with an [place] 60 mm articulating electric stapler with a green load and a single absorbable Veritas Ngoc-Strip.  The cardia/fundus of the remnant was then passed behind the distal esophagus in front of the crural repair where was passed back-and-forth in appropriate orientation (\"shoeshine maneuver\") and where the right fundic bundle rested nicely without tension and did not want to slip back to the left.  Where was noted to wrap back to itself in a 360 fashion without significant tension.  A loose floppy remnant Nissen fundoplication was performed approximately 2 cm in length using a running seromuscular nonabsorbable 2-0 V-loc suture with good result the most inferior stitch including a bite of the anterior esophageal wall at the GE junction away from the anterior vagus nerve.  Incidentally the gastric pouch was of appropriate size and rested below the Nissen fundoplication wrap.  No significant excess blind Minerva limb noted.    Attention was directed towards evaluation of the previous antecolic antegastric Minerva-en-Y gastric bypass.  The Minerva limb was followed distally however appeared to be going through Morales's space " to the right.  I then exposed Morales's space or small bowel appeared to be herniating.  I tried reducing the small bowel unsuccessfully.  Next identified the ligament of Treitz and followed it was also herniating through Morales's space.  At this point I could not delineate the appropriate anatomy and elected to run the bowel from distal to proximal.    Under direct visualization an additional 5 mm trocar was placed in the left lateral subcostal position.  The cecum was identified.  The patient was noted to be status post appendectomy.  The small bowel was run from the ileocecal valve to the jejunojejunostomy.  No abnormalities of the ileum noted, no fat encroachment or Meckel's pathology etc.    I was now able to run the Minerva limb from the gastrojejunostomy to the jejunojejunostomy.  It measured roughly 135 cm.  A large mesenteric defect noted at Morales's space, no visible previous suture material noted.  No mesenteric defect noted at the jejunojejunostomy.  Biliopancreatic limb unremarkable, approximately 20 cm. Morales's space was closed using a running nonabsorbable 2-0 V-loc suture.      The Minerva limb several centimeters distal to the gastrojejunostomy was occluded with a noncrushing bowel clamp.  I scrubbed out and performed upper endoscopy with a standard flexible endoscope.  Esophagus unremarkable.  No visible hiatal hernia Z-line 39 cm.  Gastric pouch appropriate.  Mild dilatation of the gastrojejunostomy stoma without ulcers or foreign bodies.  No mucosal ischemia noted.  Endoscopic photodocumentation obtained of the GE junction and gastrojejunostomy.    I scrubbed back in.  The Homar retractor was removed.  Fascia at the right lateral 12 mm trocar site incision was infiltrated under direct visualization with local anesthetic and closed with a horizontal mattress 0 Vicryl suture placed with a suture passer under direct visualization and tying the knot extracorporeally.  Remaining trocars were  removed under direct visualization, no bleeding noted from their sites.  Skin in each of the incisions which were gaped open due to previous abdominoplasty were closed using 3-0 Monocryl plus in an interrupted subcuticular stitch followed by skin glue.      The patient tolerated the procedure well without complication, was taken to the recovery room in stable condition.

## 2022-10-20 VITALS
WEIGHT: 114.64 LBS | HEIGHT: 61 IN | BODY MASS INDEX: 21.64 KG/M2 | TEMPERATURE: 98.9 F | SYSTOLIC BLOOD PRESSURE: 154 MMHG | RESPIRATION RATE: 16 BRPM | HEART RATE: 67 BPM | DIASTOLIC BLOOD PRESSURE: 72 MMHG | OXYGEN SATURATION: 91 %

## 2022-10-20 DIAGNOSIS — K44.9 HIATAL HERNIA WITH GASTROESOPHAGEAL REFLUX: Primary | ICD-10-CM

## 2022-10-20 DIAGNOSIS — K21.9 HIATAL HERNIA WITH GASTROESOPHAGEAL REFLUX: Primary | ICD-10-CM

## 2022-10-20 PROBLEM — R10.84 GENERALIZED ABDOMINAL PAIN: Status: RESOLVED | Noted: 2022-10-19 | Resolved: 2022-10-20

## 2022-10-20 LAB
ALBUMIN SERPL-MCNC: 3.8 G/DL (ref 3.5–5.2)
ALBUMIN/GLOB SERPL: 3.2 G/DL
ALP SERPL-CCNC: 87 U/L (ref 39–117)
ALT SERPL W P-5'-P-CCNC: 82 U/L (ref 1–33)
ANION GAP SERPL CALCULATED.3IONS-SCNC: 7 MMOL/L (ref 5–15)
AST SERPL-CCNC: 101 U/L (ref 1–32)
BASOPHILS # BLD AUTO: 0.01 10*3/MM3 (ref 0–0.2)
BASOPHILS NFR BLD AUTO: 0.1 % (ref 0–1.5)
BILIRUB SERPL-MCNC: 0.3 MG/DL (ref 0–1.2)
BUN SERPL-MCNC: 9 MG/DL (ref 8–23)
BUN/CREAT SERPL: 13.2 (ref 7–25)
CALCIUM SPEC-SCNC: 8.8 MG/DL (ref 8.6–10.5)
CHLORIDE SERPL-SCNC: 101 MMOL/L (ref 98–107)
CO2 SERPL-SCNC: 30 MMOL/L (ref 22–29)
CREAT SERPL-MCNC: 0.68 MG/DL (ref 0.57–1)
DEPRECATED RDW RBC AUTO: 41.8 FL (ref 37–54)
EGFRCR SERPLBLD CKD-EPI 2021: 93.8 ML/MIN/1.73
EOSINOPHIL # BLD AUTO: 0 10*3/MM3 (ref 0–0.4)
EOSINOPHIL NFR BLD AUTO: 0 % (ref 0.3–6.2)
ERYTHROCYTE [DISTWIDTH] IN BLOOD BY AUTOMATED COUNT: 12 % (ref 12.3–15.4)
GLOBULIN UR ELPH-MCNC: 1.2 GM/DL
GLUCOSE SERPL-MCNC: 87 MG/DL (ref 65–99)
HCT VFR BLD AUTO: 35.4 % (ref 34–46.6)
HGB BLD-MCNC: 12.2 G/DL (ref 12–15.9)
IMM GRANULOCYTES # BLD AUTO: 0.04 10*3/MM3 (ref 0–0.05)
IMM GRANULOCYTES NFR BLD AUTO: 0.3 % (ref 0–0.5)
IRON 24H UR-MRATE: 53 MCG/DL (ref 37–145)
LYMPHOCYTES # BLD AUTO: 1.77 10*3/MM3 (ref 0.7–3.1)
LYMPHOCYTES NFR BLD AUTO: 14.4 % (ref 19.6–45.3)
MCH RBC QN AUTO: 32.9 PG (ref 26.6–33)
MCHC RBC AUTO-ENTMCNC: 34.5 G/DL (ref 31.5–35.7)
MCV RBC AUTO: 95.4 FL (ref 79–97)
MONOCYTES # BLD AUTO: 1.02 10*3/MM3 (ref 0.1–0.9)
MONOCYTES NFR BLD AUTO: 8.3 % (ref 5–12)
NEUTROPHILS NFR BLD AUTO: 76.9 % (ref 42.7–76)
NEUTROPHILS NFR BLD AUTO: 9.49 10*3/MM3 (ref 1.7–7)
NRBC BLD AUTO-RTO: 0 /100 WBC (ref 0–0.2)
PLATELET # BLD AUTO: 201 10*3/MM3 (ref 140–450)
PMV BLD AUTO: 9.7 FL (ref 6–12)
POTASSIUM SERPL-SCNC: 5.4 MMOL/L (ref 3.5–5.2)
PREALB SERPL-MCNC: 17.4 MG/DL (ref 20–40)
PROT SERPL-MCNC: 5 G/DL (ref 6–8.5)
RBC # BLD AUTO: 3.71 10*6/MM3 (ref 3.77–5.28)
SODIUM SERPL-SCNC: 138 MMOL/L (ref 136–145)
WBC NRBC COR # BLD: 12.33 10*3/MM3 (ref 3.4–10.8)

## 2022-10-20 PROCEDURE — 85025 COMPLETE CBC W/AUTO DIFF WBC: CPT | Performed by: SURGERY

## 2022-10-20 PROCEDURE — 25010000002 CEFAZOLIN PER 500 MG: Performed by: SURGERY

## 2022-10-20 PROCEDURE — 84134 ASSAY OF PREALBUMIN: CPT | Performed by: SURGERY

## 2022-10-20 PROCEDURE — 25010000002 CYANOCOBALAMIN PER 1000 MCG: Performed by: SURGERY

## 2022-10-20 PROCEDURE — 99024 POSTOP FOLLOW-UP VISIT: CPT | Performed by: SURGERY

## 2022-10-20 PROCEDURE — 83540 ASSAY OF IRON: CPT | Performed by: SURGERY

## 2022-10-20 PROCEDURE — 80053 COMPREHEN METABOLIC PANEL: CPT | Performed by: SURGERY

## 2022-10-20 PROCEDURE — 25010000002 THIAMINE PER 100 MG: Performed by: SURGERY

## 2022-10-20 RX ORDER — OXYCODONE HYDROCHLORIDE 5 MG/1
5 TABLET ORAL EVERY 4 HOURS PRN
Qty: 10 TABLET | Refills: 0 | Status: SHIPPED | OUTPATIENT
Start: 2022-10-20 | End: 2022-10-20

## 2022-10-20 RX ORDER — OXYCODONE HYDROCHLORIDE 5 MG/1
5 TABLET ORAL EVERY 4 HOURS PRN
Qty: 10 TABLET | Refills: 0 | Status: CANCELLED | OUTPATIENT
Start: 2022-10-20

## 2022-10-20 RX ORDER — PROMETHAZINE HYDROCHLORIDE 12.5 MG/1
12.5 TABLET ORAL EVERY 4 HOURS PRN
Qty: 10 TABLET | Refills: 0 | Status: SHIPPED | OUTPATIENT
Start: 2022-10-20 | End: 2022-10-20

## 2022-10-20 RX ORDER — OXYCODONE HYDROCHLORIDE 5 MG/1
5 TABLET ORAL EVERY 4 HOURS PRN
Qty: 10 TABLET | Refills: 0 | Status: SHIPPED | OUTPATIENT
Start: 2022-10-20 | End: 2022-11-08

## 2022-10-20 RX ORDER — PROMETHAZINE HYDROCHLORIDE 12.5 MG/1
12.5 TABLET ORAL EVERY 4 HOURS PRN
Qty: 15 TABLET | Refills: 0 | Status: SHIPPED | OUTPATIENT
Start: 2022-10-20 | End: 2022-12-09

## 2022-10-20 RX ADMIN — SODIUM CHLORIDE 125 ML/HR: 4.5 INJECTION, SOLUTION INTRAVENOUS at 10:19

## 2022-10-20 RX ADMIN — PANTOPRAZOLE SODIUM 40 MG: 40 INJECTION, POWDER, FOR SOLUTION INTRAVENOUS at 05:15

## 2022-10-20 RX ADMIN — CYANOCOBALAMIN 1000 MCG: 1000 INJECTION, SOLUTION INTRAMUSCULAR; SUBCUTANEOUS at 08:46

## 2022-10-20 RX ADMIN — THIAMINE HYDROCHLORIDE 100 ML/HR: 100 INJECTION, SOLUTION INTRAMUSCULAR; INTRAVENOUS at 05:15

## 2022-10-20 RX ADMIN — SODIUM CHLORIDE 125 ML/HR: 4.5 INJECTION, SOLUTION INTRAVENOUS at 00:57

## 2022-10-20 RX ADMIN — ACETAMINOPHEN 1000 MG: 500 TABLET ORAL at 05:15

## 2022-10-20 RX ADMIN — OXYCODONE 5 MG: 5 TABLET ORAL at 10:36

## 2022-10-20 RX ADMIN — TRAMADOL HYDROCHLORIDE 50 MG: 50 TABLET, COATED ORAL at 08:47

## 2022-10-20 RX ADMIN — ACETAMINOPHEN 1000 MG: 500 TABLET ORAL at 14:10

## 2022-10-20 RX ADMIN — HYDROMORPHONE HYDROCHLORIDE 2 MG: 2 TABLET ORAL at 15:49

## 2022-10-20 RX ADMIN — GABAPENTIN 100 MG: 100 CAPSULE ORAL at 08:47

## 2022-10-20 RX ADMIN — CEFAZOLIN 2 G: 10 INJECTION, POWDER, FOR SOLUTION INTRAVENOUS at 00:43

## 2022-10-20 NOTE — PLAN OF CARE
Goal Outcome Evaluation:  Plan of Care Reviewed With: patient        Progress: improving     Problem: Adult Inpatient Plan of Care  Goal: Plan of Care Review  10/20/2022 0627 by Blessing Carreon RN  Outcome: Ongoing, Progressing  Flowsheets (Taken 10/20/2022 0624)  Outcome Evaluation:   Pt has had few c/o nausea   see MAR. C/o pain relieved by PRN medications per pt. Lap sites x6 CDI. Ambulating and voiding appropriately. Unsteady gait at times   stand-by assist for ambulation. No requests at this time.  10/20/2022 0627 by Blessing Carreon RN  Outcome: Ongoing, Progressing  Flowsheets (Taken 10/20/2022 0624)  Outcome Evaluation:   Pt has had few c/o nausea   see MAR. C/o pain relieved by PRN medications per pt. Lap sites x6 CDI. Ambulating and voiding appropriately. Unsteady gait at times   stand-by assist for ambulation. No requests at this time.  10/20/2022 0624 by Blessing Carreon RN  Outcome: Ongoing, Progressing  Flowsheets (Taken 10/20/2022 0624)  Outcome Evaluation:   Pt has had few c/o nausea   see MAR. C/o pain relieved by PRN medications per pt. Lap sites x6 CDI. Ambulating and voiding appropriately. Unsteady gait at times   stand-by assist for ambulation. No requests at this time.  10/20/2022 0624 by Blessing Carreon RN  Outcome: Ongoing, Progressing  Flowsheets (Taken 10/20/2022 0624)  Progress: improving  Plan of Care Reviewed With: patient  Outcome Evaluation:   Pt has had few c/o nausea   see MAR. C/o pain relieved by PRN medications per pt. Lap sites x6 CDI. Ambulating and voiding appropriately. Unsteady gait at times   stand-by assist for ambulation. No requests at this time.  Goal: Patient-Specific Goal (Individualized)  10/20/2022 0627 by Blessing Carreon RN  Outcome: Ongoing, Progressing  10/20/2022 0627 by Blessing Carreon RN  Outcome: Ongoing, Progressing  10/20/2022 0624 by Blessing Carreon RN  Outcome: Ongoing, Progressing  10/20/2022 0624 by Blessing Carreon RN  Outcome:  Ongoing, Progressing  Goal: Absence of Hospital-Acquired Illness or Injury  10/20/2022 0627 by Blessing Carreon RN  Outcome: Ongoing, Progressing  10/20/2022 0627 by Blessing Carreon RN  Outcome: Ongoing, Progressing  10/20/2022 0624 by Blessing Carreon RN  Outcome: Ongoing, Progressing  10/20/2022 0624 by Blessing Carreon RN  Outcome: Ongoing, Progressing  Intervention: Identify and Manage Fall Risk  Recent Flowsheet Documentation  Taken 10/20/2022 0600 by Blessing Carreon RN  Safety Promotion/Fall Prevention:   assistive device/personal items within [facility]   lighting adjusted   nonskid shoes/slippers when out of bed   safety round/check completed  Taken 10/20/2022 0400 by Blessing Carreon RN  Safety Promotion/Fall Prevention:   assistive device/personal items within [facility]   fall prevention program maintained   lighting adjusted   nonskid shoes/slippers when out of bed   safety round/check completed  Taken 10/20/2022 0200 by Blessing Carreon RN  Safety Promotion/Fall Prevention:   assistive device/personal items within [facility]   fall prevention program maintained   nonskid shoes/slippers when out of bed   safety round/check completed  Taken 10/20/2022 0000 by Blessing Carreon RN  Safety Promotion/Fall Prevention:   assistive device/personal items within [facility]   fall prevention program maintained   lighting adjusted   nonskid shoes/slippers when out of bed   safety round/check completed  Taken 10/19/2022 2200 by Blessing Carreon RN  Safety Promotion/Fall Prevention:   assistive device/personal items within [facility]   fall prevention program maintained   lighting adjusted   nonskid shoes/slippers when out of bed   safety round/check completed  Taken 10/19/2022 2000 by Blessing Carreon RN  Safety Promotion/Fall Prevention:   assistive device/personal items within reach   fall prevention program maintained   lighting adjusted   nonskid shoes/slippers when out of bed   safety round/check  completed  Intervention: Prevent Skin Injury  Recent Flowsheet Documentation  Taken 10/20/2022 0600 by Blessing Carreon RN  Body Position: position changed independently  Taken 10/20/2022 0400 by Blessing Carreon RN  Body Position: position changed independently  Taken 10/20/2022 0200 by Blessing Carreon RN  Body Position: position changed independently  Taken 10/20/2022 0000 by Blessing Carreon RN  Body Position: position changed independently  Taken 10/19/2022 2200 by Blessing Carreon RN  Body Position: position changed independently  Taken 10/19/2022 2000 by Blessing Carreon RN  Body Position: position changed independently  Intervention: Prevent and Manage VTE (Venous Thromboembolism) Risk  Recent Flowsheet Documentation  Taken 10/20/2022 0600 by Blessing Carreon RN  Activity Management: activity adjusted per tolerance  Taken 10/20/2022 0400 by Blessing Carreon RN  Activity Management: activity adjusted per tolerance  Taken 10/20/2022 0200 by Blessing Carreon RN  Activity Management: activity adjusted per tolerance  Taken 10/20/2022 0000 by Blessing Carreon RN  Activity Management: activity adjusted per tolerance  Taken 10/19/2022 2200 by Blessing Carreon RN  Activity Management: activity adjusted per tolerance  Taken 10/19/2022 2000 by Blessing Carreon RN  Activity Management: activity adjusted per tolerance  Goal: Optimal Comfort and Wellbeing  10/20/2022 0627 by Blessing Carreon RN  Outcome: Ongoing, Progressing  10/20/2022 0627 by Blessing Careron RN  Outcome: Ongoing, Progressing  10/20/2022 0624 by Blessing Carreon RN  Outcome: Ongoing, Progressing  10/20/2022 0624 by Blessing Carreon RN  Outcome: Ongoing, Progressing  Intervention: Monitor Pain and Promote Comfort  Recent Flowsheet Documentation  Taken 10/19/2022 2030 by Blessing Carreon RN  Pain Management Interventions: see MAR  Intervention: Provide Person-Centered Care  Recent Flowsheet Documentation  Taken 10/19/2022  2000 by Blessing Carreon RN  Trust Relationship/Rapport:   care explained   choices provided   emotional support provided  Goal: Readiness for Transition of Care  10/20/2022 0627 by Blessing Carreon RN  Outcome: Ongoing, Progressing  10/20/2022 0627 by Blessing Carreon RN  Outcome: Ongoing, Progressing  10/20/2022 0624 by Blessing Carreon RN  Outcome: Ongoing, Progressing  10/20/2022 0624 by Blessing Carreon RN  Outcome: Ongoing, Progressing     Problem: Nausea and Vomiting  Goal: Fluid and Electrolyte Balance  10/20/2022 0627 by Blessing Carreon RN  Outcome: Ongoing, Progressing  10/20/2022 0627 by Blessing Carreon RN  Outcome: Ongoing, Progressing  10/20/2022 0624 by Blessing Carreon RN  Outcome: Ongoing, Progressing  10/20/2022 0624 by Blessing Carreon RN  Outcome: Ongoing, Progressing  Intervention: Prevent and Manage Nausea and Vomiting  Recent Flowsheet Documentation  Taken 10/19/2022 2103 by Blessing Carreon RN  Nausea/Vomiting Interventions: see MAR

## 2022-10-20 NOTE — PLAN OF CARE
Goal Outcome Evaluation:  Plan of Care Reviewed With: patient        Progress: improving  Outcome Evaluation: VSS. RA. Pain controlled with PRN pain medication. Discharge orders placed.

## 2022-10-20 NOTE — CASE MANAGEMENT/SOCIAL WORK
Discharge Planning Assessment  [facility]     Patient Name: Skye De Oliveira  MRN: [id number]  Today's Date: 10/20/2022    Admit Date: 10/19/2022    Plan: CM eval   Discharge Needs Assessment    No documentation.                Discharge Plan     Row Name 10/20/22 1258       Plan    Plan CM eval    Plan Comments Confirmed with patient that she lives with her spouse in [facility]. Patient confirms she does not use and DME and has no new DME needs currently. Goal is to return home upon discharge - no dc needs identified at this time- CM will continue to follow for dc planning -dorian [phone]    Final Discharge Disposition Code 01 - home or self-care              Continued Care and Services - Admitted Since 10/19/2022    Coordination has not been started for this encounter.          Demographic Summary    No documentation.                Functional Status    No documentation.                Psychosocial    No documentation.                Abuse/Neglect    No documentation.                Legal    No documentation.                Substance Abuse    No documentation.                Patient Forms    No documentation.                   Dorian Hanley RN

## 2022-10-20 NOTE — PROGRESS NOTES
"Cc: POD#1  Lap recurrent HHR/Nissen/reduction and repair internal hernia  \"better\"    Her  is in the room.  She feels better than when I saw her last evening, less chest pain.  She feels its gas.  I offered her to stay another day but she would like to go home if possible.  She is doing better with her spirometer.  She is drinking well and ate some Jell-O, can notice a difference from preoperative symptoms but feels she will know for sure until she gets on a regular diet.  She says she has her post hiatal hernia repair diet.  She is ambulating well and voiding \"a lot\".  Small amount of flatus, no bowel movement.  No pulmonary complaints, spirometer 1250.    No fever or tachycardia pulse 77 respiration 16 blood pressure 154/72 saturation 94% UO NR - 300 she is no apparent distress.  No longer hoarse like last night, voice normal.  Lungs clear to auscultation.  Heart regular rate and rhythm.  Abdomen soft, nontender/appropriate, nondistended, bowel sounds present but hypoactive.  Wounds look okay but some bruising irregular shape around 5 to 7 cm around her right lateral 12 mm trocar site incision.    CMP normal except for potassium 5.4 CO2 30 total protein 5 ALT 82 .  Iron is 53.  Prealbumin low at 17.4.  White count 12.3 with 77 segs 14 lymphs 8 monocytes no bands H&H 12 and 35.4.  Hemoglobin A1c normal 5.40    Impression: Postop day 1 laparoscopic recurrent hiatal hernia repair, remnant Nissen fundoplication, and reduction and repair of internal hernia Morales's space.  She is doing well clinically, would like to go home and does meet discharge criteria.  Low prealbumin.  Hyperkalemia.  Mild elevation in transaminases likely related to liver retraction during surgery    Plan: Discharge home.  Discharge instructions discussed.  Post hiatal hernia repair diet.  See orders.  Call with any problems questions or concerns.  "

## 2022-10-31 RX ORDER — CHOLECALCIFEROL TAB 125 MCG (5000 UNIT) 125 MCG (5000 UT)
TAB
Qty: 60 TABLET | Refills: 5 | Status: SHIPPED | OUTPATIENT
Start: 2022-10-31 | End: 2023-02-07 | Stop reason: SDUPTHER

## 2022-10-31 NOTE — TELEPHONE ENCOUNTER
Rx Refill Note    Requested Prescriptions     Pending Prescriptions Disp Refills   • Natural Vitamin D-3 125 MCG (5000 UT) tablet [Pharmacy Med Name: VITAMIN D3 (CHOLECAL) 5,000 IU TAB] 60 tablet      Sig: TAKE ONE TABLET BY MOUTH EVERY EVENING        Last office visit with prescribing clinician: 9/14/2022      Next office visit with prescribing clinician: NONE  Last labs: 10/20/2022  Last refill:  NEVER   [facility]

## 2022-11-03 ENCOUNTER — OFFICE VISIT (OUTPATIENT)
Dept: BARIATRICS/WEIGHT MGMT | Facility: CLINIC | Age: 72
End: 2022-11-03

## 2022-11-03 VITALS
HEART RATE: 88 BPM | SYSTOLIC BLOOD PRESSURE: 144 MMHG | DIASTOLIC BLOOD PRESSURE: 82 MMHG | BODY MASS INDEX: 21.66 KG/M2 | TEMPERATURE: 96.9 F | HEIGHT: 61 IN | OXYGEN SATURATION: 97 %

## 2022-11-03 DIAGNOSIS — K21.9 HIATAL HERNIA WITH GASTROESOPHAGEAL REFLUX: Primary | ICD-10-CM

## 2022-11-03 DIAGNOSIS — K44.9 HIATAL HERNIA WITH GASTROESOPHAGEAL REFLUX: Primary | ICD-10-CM

## 2022-11-03 PROCEDURE — 99024 POSTOP FOLLOW-UP VISIT: CPT | Performed by: PHYSICIAN ASSISTANT

## 2022-11-03 NOTE — PROGRESS NOTES
[facility] Bariatric Surgery  [address]  [address]  [facility] [address]  [phone]      Patient Name:  Skye De Oliveira  :  1950      Date of Visit: 2022      Reason for Visit:  POD #15    HPI:  Skye De Oliveira is a 71 y.o. female s/p laparoscopic gastric bypass with a 120 cm Minerva limb/ HHR by Dr. Shan Booker at [facility] in [facility] 14, s/p lap elicia/ VLAD for partial SBO by Dr. Booker 14. Now s/p lap recurrent HHR w/ remnant Nissen and reduction and repair of internal hernia Morales's space.       Discharged on POD#1.  Doing ok.  Had some left shoulder and back pain the first week after surgery, but this is resolving.  She does not have any dysphagia, but does report decreased appetite and fatigue.  Had one episode of vomiting, but she admits to attempting to eat a food item ahead of her diet progression.  No other issues/concerns.  Denies dysphagia, reflux, nausea, abdominal pain, pulmonary issues, fevers, hair loss, memory loss, vision changes and numbness/tingling.  Tolerating diet progression.  Staying adequately hydrated with no issue.  Juggling with appetite, but trying to focus on protein intake.  Taking MVI and B12.  On Omeprazole .   Ambulating.        Past Medical History:   Diagnosis Date   • Abnormal EKG 2022   • Adenomatous polyp of colon    • Age-related osteoporosis without current pathological fracture    • Anxiety    • Chronic kidney disease     STAGE 2   • Costochondral chest pain    • Depression    • DJD (degenerative joint disease)     MULTIPLE JOINTS   • GERD without esophagitis    • H/O mammogram 2016    [facility]   • High risk medication use    • History of COVID-19 2021    no hospital no steroids   • Hypokalemia    • Major depression in remission (HCC)    • PONV (postoperative nausea and vomiting)    • Primary insomnia    • Primary osteoarthritis involving multiple joints    • S/P bariatric  surgery    • Thyroid nodule     MULTIPLE   • Vitamin D deficiency      Past Surgical History:   Procedure Laterality Date   • ABDOMINOPLASTY  2007   • APPENDECTOMY     • BLADDER REPAIR  2011    [facility]   • BREAST SURGERY  2008    REMOVED IMPLANTS AND REMOVED MORE FIROIDS   • CATARACT EXTRACTION Bilateral    • CHOLECYSTECTOMY  2014    s/p lap elicia/ VLAD for partial SBO by Dr. Booker 2/17/14.   • COLONOSCOPY  2017   • ENDOSCOPY     • ENDOSCOPY N/A 10/19/2022    Procedure: ESOPHAGOGASTRODUODENOSCOPY;  Surgeon: Ton Jurado MD;  Location:  [facility];  Service: General;  Laterality: N/A;   • GASTRIC BYPASS  2014    with a 120 cm Minerva limb/ HHR by Dr. Shan Booker at [facility] in [facility] 1/22/14   • HAMMER TOE REPAIR     • HIATAL HERNIA REPAIR N/A 10/19/2022    Procedure: HIATAL HERNIA REPAIR LAPAROSCOPIC;  Surgeon: Ton Jurado MD;  Location:  [facility];  Service: General;  Laterality: N/A;   • HIP EXAMINATION UNDER ANESTHESIA Left     gluteus medius repair   • HYSTERECTOMY  1974    AND UILATERAL OOPHHORETOMY   • KNEE SURGERY Left 2010    arthroscopy debridement   • MASTOPEXY Bilateral 1982    WITH IMPLANT RECONSTRUCTION   • NISSEN FUNDOPLICATION N/A 10/19/2022    Procedure: NISSEN FUNDOPLICATION LAPAROSCOPIC;  Surgeon: Ton Jurado MD;  Location:  [facility];  Service: General;  Laterality: N/A;   • OVARY SURGERY Right 1998    REMOVED   • REFRACTIVE SURGERY Bilateral    • SHOULDER SURGERY Bilateral     ROTATOR CUFF  RIGHT 2000 LEFT 2001   • TONSILLECTOMY  1974    ADENOIODECTOMY   • VOCAL CORD BIOPSY  1992    POLYPS REMOVED     Outpatient Medications Marked as Taking for the 11/3/22 encounter (Office Visit) with Ankita Jefferson PA   Medication Sig Dispense Refill   • clonazePAM (KlonoPIN) 0.5 MG tablet Take 1 tablet by mouth At Night As Needed for Anxiety. 30 tablet 2   • melatonin 5 MG tablet tablet Take 5 mg by mouth At Night As Needed.     • multivitamin (THERAGRAN)  "tablet tablet Take 1 tablet by mouth Daily.     • Natural Vitamin D-3 125 MCG (5000 UT) tablet TAKE ONE TABLET BY MOUTH EVERY EVENING 60 tablet 5   • omeprazole (priLOSEC) 40 MG capsule Take 1 capsule by mouth 2 (Two) Times a Day. 180 capsule 0   • oxyCODONE (Roxicodone) 5 MG immediate release tablet Take 1 tablet by mouth Every 4 (Four) Hours As Needed for Moderate Pain. 10 tablet 0   • promethazine (PHENERGAN) 12.5 MG tablet Take 1 tablet by mouth Every 4 (Four) Hours As Needed for Nausea or Vomiting. 15 tablet 0   • raloxifene (EVISTA) 60 MG tablet Take 60 mg by mouth Daily.     • traMADol (ULTRAM) 50 MG tablet Take 1 tablet by mouth Daily. 30 tablet 2     Allergies   Allergen Reactions   • Penicillins Hives     Tolerates cephalospins well         Social History     Socioeconomic History   • Marital status:    Tobacco Use   • Smoking status: Never   • Smokeless tobacco: Never   Vaping Use   • Vaping Use: Never used   Substance and Sexual Activity   • Alcohol use: No   • Drug use: No   • Sexual activity: Not Currently     Partners: Male     Comment: no hormones     Social History     Social History Narrative    Works part time [place] [employer]- . Lives with .        /82 (BP Location: Left arm, Patient Position: Sitting, Cuff Size: Adult)   Pulse 88   Temp 96.9 °F (36.1 °C) (Infrared)   Ht 154.9 cm (61\")   SpO2 97%   BMI 21.66 kg/m²   Physical Exam  Constitutional:       General: She is not in acute distress.     Appearance: Normal appearance.   HENT:      Head: Normocephalic and atraumatic.   Cardiovascular:      Rate and Rhythm: Normal rate and regular rhythm.   Pulmonary:      Effort: Pulmonary effort is normal.      Breath sounds: Normal breath sounds.   Abdominal:      General: Bowel sounds are normal. There is no distension.      Palpations: Abdomen is soft. There is no mass.      Tenderness: There is no abdominal tenderness.      Comments: Lap incisions " healing well.  No erythema, induration, fluctuance, drainage   Skin:     General: Skin is warm and dry.   Neurological:      General: No focal deficit present.      Mental Status: She is alert and oriented to person, place, and time.   Psychiatric:         Mood and Affect: Mood normal.         Behavior: Behavior normal.           Assessment:   POD #15    BMI is within normal parameters. No other follow-up for BMI required.      Plan:  Doing well.  Continue to advance diet per handout.  Encourage patient to increase protein and overall calorie intake.  We discussed several options to aid with protein intake..  Increase exercise/activity as tolerated.  Reviewed lifting restrictions, nothing >25 lbs x 2 more weeks.  Continue bariatric vitamins.  Continue PPI.  Continue to avoid ASA/NSAIDs/tobacco/sterioids for life. Call w/ problems/concerns.    The patient was instructed to follow up in 6 months, sooner if needed.

## 2022-11-08 ENCOUNTER — OFFICE VISIT (OUTPATIENT)
Dept: FAMILY MEDICINE CLINIC | Facility: CLINIC | Age: 72
End: 2022-11-08

## 2022-11-08 VITALS
HEIGHT: 61 IN | HEART RATE: 79 BPM | SYSTOLIC BLOOD PRESSURE: 126 MMHG | TEMPERATURE: 97.7 F | OXYGEN SATURATION: 96 % | DIASTOLIC BLOOD PRESSURE: 80 MMHG | WEIGHT: 108 LBS | BODY MASS INDEX: 20.39 KG/M2

## 2022-11-08 DIAGNOSIS — K21.9 GASTROESOPHAGEAL REFLUX DISEASE WITHOUT ESOPHAGITIS: Primary | ICD-10-CM

## 2022-11-08 DIAGNOSIS — R11.0 NAUSEA: ICD-10-CM

## 2022-11-08 PROBLEM — M19.90 OSTEOARTHRITIS: Status: ACTIVE | Noted: 2022-11-08

## 2022-11-08 PROCEDURE — 99214 OFFICE O/P EST MOD 30 MIN: CPT | Performed by: FAMILY MEDICINE

## 2022-11-08 RX ORDER — FAMOTIDINE 40 MG/1
40 TABLET, FILM COATED ORAL DAILY
Qty: 30 TABLET | Refills: 5 | Status: SHIPPED | OUTPATIENT
Start: 2022-11-08 | End: 2022-12-08

## 2022-11-08 NOTE — PROGRESS NOTES
Date: 2022   Patient Name: Skye De Oliveira  : 1950   MRN: [id number]     Chief Complaint:    Chief Complaint   Patient presents with   • Abdominal Pain     Patient recently had 3 hernia's operated on, patient is here to follow up        History of Present Illness: Skye De Oliveira is a 72 y.o. female who is here today for Abdominal pain.  She recently underwent hiatal hernia and ventral hernia repair.  She reports symptoms are improved but she continues to struggle with chronic nausea and vomiting especially after eating any solid foods.  This has been an ongoing issue for many years but has worsened recently over the last few months.  She is currently taking pantoprazole 40 mg twice daily.  She is also on raloxifene for osteoporosis that was started about 1 year ago.           Review of Systems:   Review of Systems   Constitutional: Negative for chills, fatigue and fever.   Respiratory: Negative for cough and shortness of breath.    Cardiovascular: Negative for chest pain and palpitations.   Gastrointestinal: Positive for abdominal pain, nausea, vomiting and indigestion. Negative for constipation and diarrhea.   Musculoskeletal: Negative for back pain and myalgias.   Neurological: Negative for dizziness and headache.   Psychiatric/Behavioral: Negative for depressed mood. The patient is not nervous/anxious.        Past Medical History:   Past Medical History:   Diagnosis Date   • Abnormal EKG 2022   • Adenomatous polyp of colon    • Age-related osteoporosis without current pathological fracture    • Anxiety    • Chronic kidney disease     STAGE 2   • Costochondral chest pain    • Depression    • DJD (degenerative joint disease)     MULTIPLE JOINTS   • GERD without esophagitis    • [facility]   • High risk medication use    • History of COVID-19 2021    no hospital no steroids   • Hypokalemia    • Major depression in remission (HCC)    • PONV (postoperative nausea  and vomiting)    • Primary insomnia    • Primary osteoarthritis involving multiple joints    • S/P bariatric surgery    • Thyroid nodule     MULTIPLE   • Vitamin D deficiency        Past Surgical History:   Past Surgical History:   Procedure Laterality Date   • ABDOMINOPLASTY  2007   • APPENDECTOMY     • BLADDER REPAIR  2011    [facility]   • BREAST SURGERY  2008    REMOVED IMPLANTS AND REMOVED MORE FIROIDS   • CATARACT EXTRACTION Bilateral    • CHOLECYSTECTOMY  2014    s/p lap elicia/ VLAD for partial SBO by Dr. Booker 2/17/14.   • COLONOSCOPY  2017   • ENDOSCOPY     • ENDOSCOPY N/A 10/19/2022    Procedure: ESOPHAGOGASTRODUODENOSCOPY;  Surgeon: Ton Jurado MD;  Location:  [facility];  Service: General;  Laterality: N/A;   • GASTRIC BYPASS  2014    with a 120 cm Minerva limb/ HHR by Dr. Shan Booker at [facility] 1/22/14   • HAMMER TOE REPAIR     • HIATAL HERNIA REPAIR N/A 10/19/2022    Procedure: HIATAL HERNIA REPAIR LAPAROSCOPIC;  Surgeon: Ton Jurado MD;  Location:  [facility];  Service: General;  Laterality: N/A;   • HIP EXAMINATION UNDER ANESTHESIA Left     gluteus medius repair   • HYSTERECTOMY  1974    AND UILATERAL OOPHHORETOMY   • KNEE SURGERY Left 2010    arthroscopy debridement   • MASTOPEXY Bilateral 1982    WITH IMPLANT RECONSTRUCTION   • NISSEN FUNDOPLICATION N/A 10/19/2022    Procedure: NISSEN FUNDOPLICATION LAPAROSCOPIC;  Surgeon: Ton Jurado MD;  Location:  [facility];  Service: General;  Laterality: N/A;   • OVARY SURGERY Right 1998    REMOVED   • REFRACTIVE SURGERY Bilateral    • SHOULDER SURGERY Bilateral     ROTATOR CUFF  RIGHT 2000 LEFT 2001   • TONSILLECTOMY  1974    ADENOIODECTOMY   • VOCAL CORD BIOPSY  1992    POLYPS REMOVED       Family History:   Family History   Problem Relation Age of Onset   • Lung cancer Mother    • Arthritis Mother    • Cancer Mother         Lung cancer   • Lung cancer Father 49   • Cancer Father         Lung cancer  "  • Diabetes Brother    • Hearing loss Brother    • Hypertension Brother    • Breast cancer Neg Hx    • Ovarian cancer Neg Hx    • Endometrial cancer Neg Hx        Social History:   Social History     Socioeconomic History   • Marital status:    Tobacco Use   • Smoking status: Never   • Smokeless tobacco: Never   Vaping Use   • Vaping Use: Never used   Substance and Sexual Activity   • Alcohol use: No   • Drug use: No   • Sexual activity: Not Currently     Partners: Male     Comment: no hormones       Medications:     Current Outpatient Medications:   •  clonazePAM (KlonoPIN) 0.5 MG tablet, Take 1 tablet by mouth At Night As Needed for Anxiety., Disp: 30 tablet, Rfl: 2  •  melatonin 5 MG tablet tablet, Take 5 mg by mouth At Night As Needed., Disp: , Rfl:   •  multivitamin (THERAGRAN) tablet tablet, Take 1 tablet by mouth Daily., Disp: , Rfl:   •  Natural Vitamin D-3 125 MCG (5000 UT) tablet, TAKE ONE TABLET BY MOUTH EVERY EVENING, Disp: 60 tablet, Rfl: 5  •  omeprazole (priLOSEC) 40 MG capsule, Take 1 capsule by mouth 2 (Two) Times a Day., Disp: 180 capsule, Rfl: 0  •  promethazine (PHENERGAN) 12.5 MG tablet, Take 1 tablet by mouth Every 4 (Four) Hours As Needed for Nausea or Vomiting., Disp: 15 tablet, Rfl: 0  •  traMADol (ULTRAM) 50 MG tablet, Take 1 tablet by mouth Daily., Disp: 30 tablet, Rfl: 2  •  famotidine (PEPCID) 40 MG tablet, Take 1 tablet by mouth Daily., Disp: 30 tablet, Rfl: 5    Allergies:   Allergies   Allergen Reactions   • Penicillins Hives     Tolerates cephalospins well           Physical Exam:  Vital Signs:   Vitals:    11/08/22 1302   BP: 126/80   BP Location: Right arm   Patient Position: Sitting   Cuff Size: Adult   Pulse: 79   Temp: 97.7 °F (36.5 °C)   TempSrc: Temporal   SpO2: 96%   Weight: 49 kg (108 lb)   Height: 154.9 cm (61\")     Body mass index is 20.41 kg/m².     Physical Exam  Vitals and nursing note reviewed.   Constitutional:       Appearance: Normal appearance.   HENT:      " Head: Normocephalic and atraumatic.   Cardiovascular:      Rate and Rhythm: Normal rate and regular rhythm.      Heart sounds: Normal heart sounds. No murmur heard.  Pulmonary:      Effort: Pulmonary effort is normal.      Breath sounds: Normal breath sounds. No wheezing.   Abdominal:      General: Bowel sounds are normal.      Palpations: Abdomen is soft.      Comments: Multiple well-healed trocar incisions on abdomen   Neurological:      Mental Status: She is alert and oriented to person, place, and time. Mental status is at baseline.   Psychiatric:         Mood and Affect: Mood normal.         Behavior: Behavior normal.           Assessment/Plan:   Diagnoses and all orders for this visit:    1. Gastroesophageal reflux disease without esophagitis (Primary)  Assessment & Plan:  GERD symptoms have improved after hiatal hernia repair but she is still having some chronic nausea and vomiting with solids.  I would like her to discontinue her raloxifene to make sure this is not playing a role in her worsening symptoms over the last few months.  I will also add famotidine 40 mg once a day.  We will follow-up in 1 month.  We did discuss sending the patient to a nutritionist but will wait until our follow-up appointment.    I spent 35 minutes reviewing recent surgical records and hospitalization, discussing history and current symptoms with the patient, and on documentation    Orders:  -     famotidine (PEPCID) 40 MG tablet; Take 1 tablet by mouth Daily.  Dispense: 30 tablet; Refill: 5    2. Nausea  Assessment & Plan:  Discontinue raloxifene and start famotidine in addition to pantoprazole as above.    Orders:  -     famotidine (PEPCID) 40 MG tablet; Take 1 tablet by mouth Daily.  Dispense: 30 tablet; Refill: 5         Follow Up:   Return in about 1 month (around 12/8/2022) for Med Recheck.    Beverley Flores,   [facility]

## 2022-11-08 NOTE — ASSESSMENT & PLAN NOTE
GERD symptoms have improved after hiatal hernia repair but she is still having some chronic nausea and vomiting with solids.  I would like her to discontinue her raloxifene to make sure this is not playing a role in her worsening symptoms over the last few months.  I will also add famotidine 40 mg once a day.  We will follow-up in 1 month.  We did discuss sending the patient to a nutritionist but will wait until our follow-up appointment.    I spent 35 minutes reviewing recent surgical records and hospitalization, discussing history and current symptoms with the patient, and on documentation

## 2022-12-08 RX ORDER — OMEPRAZOLE 40 MG/1
CAPSULE, DELAYED RELEASE ORAL
Qty: 90 CAPSULE | Refills: 0 | Status: SHIPPED | OUTPATIENT
Start: 2022-12-08 | End: 2023-02-07 | Stop reason: SDUPTHER

## 2022-12-09 ENCOUNTER — OFFICE VISIT (OUTPATIENT)
Dept: FAMILY MEDICINE CLINIC | Facility: CLINIC | Age: 72
End: 2022-12-09

## 2022-12-09 VITALS
DIASTOLIC BLOOD PRESSURE: 80 MMHG | TEMPERATURE: 97.3 F | HEART RATE: 75 BPM | OXYGEN SATURATION: 97 % | WEIGHT: 106.9 LBS | HEIGHT: 61 IN | BODY MASS INDEX: 20.18 KG/M2 | SYSTOLIC BLOOD PRESSURE: 122 MMHG

## 2022-12-09 DIAGNOSIS — F51.01 PRIMARY INSOMNIA: Primary | ICD-10-CM

## 2022-12-09 DIAGNOSIS — R11.0 NAUSEA: ICD-10-CM

## 2022-12-09 DIAGNOSIS — K21.9 GASTROESOPHAGEAL REFLUX DISEASE WITHOUT ESOPHAGITIS: ICD-10-CM

## 2022-12-09 PROCEDURE — 99214 OFFICE O/P EST MOD 30 MIN: CPT | Performed by: FAMILY MEDICINE

## 2022-12-09 RX ORDER — DARIDOREXANT 50 MG/1
50 TABLET, FILM COATED ORAL NIGHTLY
Qty: 30 TABLET | Refills: 5 | Status: SHIPPED | OUTPATIENT
Start: 2022-12-09 | End: 2022-12-20 | Stop reason: SDUPTHER

## 2022-12-09 RX ORDER — CLONAZEPAM 0.5 MG/1
0.25 TABLET ORAL NIGHTLY PRN
Qty: 15 TABLET | Refills: 0 | Status: SHIPPED | OUTPATIENT
Start: 2022-12-09 | End: 2023-01-09

## 2022-12-09 NOTE — ASSESSMENT & PLAN NOTE
Significantly improved after discontinuation of raloxifene and addition of famotidine.  She is still avoiding dairy and we discussed her trying almond milk and replacement.

## 2022-12-09 NOTE — ASSESSMENT & PLAN NOTE
Unchanged, we discussed medication options and decided to start Quviviq for insomnia.  Patient is agreeable to slowly tapering off Klonopin.  We discussed side effects of the new medication.  Follow-up in 1 month

## 2022-12-09 NOTE — ASSESSMENT & PLAN NOTE
Significantly improved after discontinuation of raloxifene.  She will continue famotidine and omeprazole daily.

## 2022-12-09 NOTE — PROGRESS NOTES
Date: 2022   Patient Name: Skye De Oliveira  : 1950   MRN: [id number]     Chief Complaint:    Chief Complaint   Patient presents with   • Heartburn     Follow up    • Anxiety     Controlled substance refill        History of Present Illness: Skye De Oliveira is a 72 y.o. female who is here today to follow up for GERD, nausea, vomiting and insomnia.  She reports significant improvement in GERD, abdominal pain, nausea and vomiting since stopping raloxifene and adding famotidine to her PPI.  She reports she has not had any episodes of vomiting and GERD is well controlled.  She is still avoiding dairy as this causes abdominal pain and diarrhea and she is still only able to eat in small amounts but weight is stable.    She has suffered from chronic insomnia for many years.  She has been on Klonopin 0.5 mg at bedtime since .  She reports it is not helping anymore for sleep and she would like to discuss tapering off the Klonopin and starting a different medication for sleep.  She reports difficulty shutting her mind off in order to fall asleep.           Review of Systems:   Review of Systems   Constitutional: Negative for chills, fatigue and fever.   Respiratory: Negative for cough and shortness of breath.    Cardiovascular: Negative for chest pain and palpitations.   Gastrointestinal: Negative for abdominal pain, constipation, diarrhea, nausea and vomiting.   Musculoskeletal: Negative for back pain and myalgias.   Neurological: Negative for dizziness and headache.   Psychiatric/Behavioral: Positive for sleep disturbance. Negative for depressed mood. The patient is not nervous/anxious.        Past Medical History:   Past Medical History:   Diagnosis Date   • Abnormal EKG 2022   • Adenomatous polyp of colon    • Age-related osteoporosis without current pathological fracture    • Anxiety    • Chronic kidney disease     STAGE 2   • Costochondral chest pain    • Depression    • DJD  (degenerative joint disease)     MULTIPLE JOINTS   • GERD without esophagitis    • H/O mammogram 05/24/2016    [facility]   • High risk medication use    • History of COVID-19 01/2021    no hospital no steroids   • Hypokalemia    • Major depression in remission (HCC)    • PONV (postoperative nausea and vomiting)    • Primary insomnia    • Primary osteoarthritis involving multiple joints    • S/P bariatric surgery    • Thyroid nodule     MULTIPLE   • Vitamin D deficiency        Past Surgical History:   Past Surgical History:   Procedure Laterality Date   • ABDOMINOPLASTY  2007   • APPENDECTOMY     • BLADDER REPAIR  2011    [facility]   • BREAST SURGERY  2008    REMOVED IMPLANTS AND REMOVED MORE FIROIDS   • CATARACT EXTRACTION Bilateral    • CHOLECYSTECTOMY  2014    s/p lap elicia/ VLAD for partial SBO by Dr. Booker 2/17/14.   • COLONOSCOPY  2017   • ENDOSCOPY     • ENDOSCOPY N/A 10/19/2022    Procedure: ESOPHAGOGASTRODUODENOSCOPY;  Surgeon: Ton Jurado MD;  Location:  [facility];  Service: General;  Laterality: N/A;   • GASTRIC BYPASS  2014    with a 120 cm Minerva limb/ HHR by Dr. Shan Booker at [facility] 1/22/14   • HAMMER TOE REPAIR     • HERNIA REPAIR     • HIATAL HERNIA REPAIR N/A 10/19/2022    Procedure: HIATAL HERNIA REPAIR LAPAROSCOPIC;  Surgeon: Ton Jurado MD;  Location:  [facility];  Service: General;  Laterality: N/A;   • HIP EXAMINATION UNDER ANESTHESIA Left     gluteus medius repair   • HYSTERECTOMY  1974    AND UILATERAL OOPHHORETOMY   • KNEE SURGERY Left 2010    arthroscopy debridement   • MASTOPEXY Bilateral 1982    WITH IMPLANT RECONSTRUCTION   • NISSEN FUNDOPLICATION N/A 10/19/2022    Procedure: NISSEN FUNDOPLICATION LAPAROSCOPIC;  Surgeon: Ton Jurado MD;  Location:  [facility];  Service: General;  Laterality: N/A;   • OVARY SURGERY Right 1998    REMOVED   • REFRACTIVE SURGERY Bilateral    • SHOULDER SURGERY Bilateral     ROTATOR CUFF  RIGHT 2000 LEFT  2001   • TONSILLECTOMY  1974    ADENOIODECTOMY   • VOCAL CORD BIOPSY  1992    POLYPS REMOVED       Family History:   Family History   Problem Relation Age of Onset   • Lung cancer Mother    • Arthritis Mother    • Cancer Mother         Lung cancer   • Lung cancer Father 49   • Cancer Father         Lung cancer   • Diabetes Brother    • Hearing loss Brother    • Hypertension Brother    • Breast cancer Neg Hx    • Ovarian cancer Neg Hx    • Endometrial cancer Neg Hx        Social History:   Social History     Socioeconomic History   • Marital status:    Tobacco Use   • Smoking status: Never   • Smokeless tobacco: Never   Vaping Use   • Vaping Use: Never used   Substance and Sexual Activity   • Alcohol use: No   • Drug use: No   • Sexual activity: Not Currently     Partners: Male     Comment: no hormones       Medications:     Current Outpatient Medications:   •  clonazePAM (KlonoPIN) 0.5 MG tablet, Take 0.5 tablets by mouth At Night As Needed for Anxiety., Disp: 15 tablet, Rfl: 0  •  melatonin 5 MG tablet tablet, Take 5 mg by mouth At Night As Needed., Disp: , Rfl:   •  multivitamin (THERAGRAN) tablet tablet, Take 1 tablet by mouth Daily., Disp: , Rfl:   •  Natural Vitamin D-3 125 MCG (5000 UT) tablet, TAKE ONE TABLET BY MOUTH EVERY EVENING, Disp: 60 tablet, Rfl: 5  •  omeprazole (priLOSEC) 40 MG capsule, TAKE 1 CAPSULE BY MOUTH EVERY DAY BEFORE A MEAL, Disp: 90 capsule, Rfl: 0  •  traMADol (ULTRAM) 50 MG tablet, Take 1 tablet by mouth Daily., Disp: 30 tablet, Rfl: 2  •  Daridorexant HCl (Quviviq) 50 MG tablet, Take 1 tablet by mouth Every Night., Disp: 30 tablet, Rfl: 5    Allergies:   Allergies   Allergen Reactions   • Penicillins Hives     Tolerates cephalospins well         PHQ-2 Total Score:     PHQ-9 Total Score:       Physical Exam:  Vital Signs:   Vitals:    12/09/22 1316   BP: 122/80   BP Location: Left arm   Patient Position: Sitting   Cuff Size: Adult   Pulse: 75   Temp: 97.3 °F (36.3 °C)   TempSrc:  "Temporal   SpO2: 97%   Weight: 48.5 kg (106 lb 14.4 oz)   Height: 154.9 cm (61\")     Body mass index is 20.2 kg/m².     Physical Exam  Vitals and nursing note reviewed.   Constitutional:       Appearance: Normal appearance.   Cardiovascular:      Rate and Rhythm: Normal rate and regular rhythm.      Heart sounds: Normal heart sounds. No murmur heard.  Pulmonary:      Effort: Pulmonary effort is normal.      Breath sounds: Normal breath sounds. No wheezing.   Abdominal:      General: Bowel sounds are normal.      Palpations: Abdomen is soft.   Neurological:      Mental Status: She is alert and oriented to person, place, and time. Mental status is at baseline.   Psychiatric:         Mood and Affect: Mood normal.         Behavior: Behavior normal.           Assessment/Plan:   Diagnoses and all orders for this visit:    1. Primary insomnia (Primary)  Assessment & Plan:  Unchanged, we discussed medication options and decided to start Quviviq for insomnia.  Patient is agreeable to slowly tapering off Klonopin.  We discussed side effects of the new medication.  Follow-up in 1 month    Orders:  -     clonazePAM (KlonoPIN) 0.5 MG tablet; Take 0.5 tablets by mouth At Night As Needed for Anxiety.  Dispense: 15 tablet; Refill: 0  -     Daridorexant HCl (Quviviq) 50 MG tablet; Take 1 tablet by mouth Every Night.  Dispense: 30 tablet; Refill: 5    2. Nausea  Assessment & Plan:  Significantly improved after discontinuation of raloxifene and addition of famotidine.  She is still avoiding dairy and we discussed her trying almond milk and replacement.      3. Gastroesophageal reflux disease without esophagitis  Assessment & Plan:  Significantly improved after discontinuation of raloxifene.  She will continue famotidine and omeprazole daily.           Follow Up:   Return in about 1 month (around 1/9/2023) for Med Recheck.    Beverley Flores, DO  [facility]    "

## 2022-12-13 ENCOUNTER — TELEPHONE (OUTPATIENT)
Dept: FAMILY MEDICINE CLINIC | Facility: CLINIC | Age: 72
End: 2022-12-13

## 2022-12-13 NOTE — TELEPHONE ENCOUNTER
Caller: Skye De Oliveira    Relationship: Self    Best call back number: [phone]    What medications are you currently taking:   Current Outpatient Medications on File Prior to Visit   Medication Sig Dispense Refill   • clonazePAM (KlonoPIN) 0.5 MG tablet Take 0.5 tablets by mouth At Night As Needed for Anxiety. 15 tablet 0   • Daridorexant HCl (Quviviq) 50 MG tablet Take 1 tablet by mouth Every Night. 30 tablet 5   • melatonin 5 MG tablet tablet Take 5 mg by mouth At Night As Needed.     • multivitamin (THERAGRAN) tablet tablet Take 1 tablet by mouth Daily.     • Natural Vitamin D-3 125 MCG (5000 UT) tablet TAKE ONE TABLET BY MOUTH EVERY EVENING 60 tablet 5   • omeprazole (priLOSEC) 40 MG capsule TAKE 1 CAPSULE BY MOUTH EVERY DAY BEFORE A MEAL 90 capsule 0   • traMADol (ULTRAM) 50 MG tablet Take 1 tablet by mouth Daily. 30 tablet 2     No current facility-administered medications on file prior to visit.          When did you start taking these medications: HAVEN'T STARTED YET, UNABLE TO BE FILLED    Which medication are you concerned about: QUVIVIQ    Who prescribed you this medication: DR. FIORE    What are your concerns: CAN'T GET FILLED NEEDS PRIOR AUTHORIZATION    How long have you had these concerns: TODAY

## 2022-12-20 ENCOUNTER — TELEPHONE (OUTPATIENT)
Dept: FAMILY MEDICINE CLINIC | Facility: CLINIC | Age: 72
End: 2022-12-20

## 2022-12-20 DIAGNOSIS — F51.01 PRIMARY INSOMNIA: ICD-10-CM

## 2022-12-20 RX ORDER — DARIDOREXANT 50 MG/1
50 TABLET, FILM COATED ORAL NIGHTLY
Qty: 30 TABLET | Refills: 5 | Status: SHIPPED | OUTPATIENT
Start: 2022-12-20 | End: 2023-02-21 | Stop reason: SDUPTHER

## 2022-12-20 NOTE — TELEPHONE ENCOUNTER
Caller: Skye De Oliveira    Relationship to patient: Self    Best call back number: [phone]    Patient is needing: PATIENT STATED THAT SHE IS CALLING TO CHECK ON STATUS OF PRIOR AUTHORIZATION; PATIENT STATED THAT SHE IS LEAVING TO GO OUT OF TOWN FOR [place] AND WOULD LIKE TO SEE IF THIS WOULD BE DONE THIS WEEK AND BEFORE SHE IS OUT OF THE MEDICATION THAT IS REPLACING    PLEASE ADVISE

## 2023-01-09 ENCOUNTER — OFFICE VISIT (OUTPATIENT)
Dept: FAMILY MEDICINE CLINIC | Facility: CLINIC | Age: 73
End: 2023-01-09
Payer: MEDICARE

## 2023-01-09 VITALS
HEART RATE: 86 BPM | SYSTOLIC BLOOD PRESSURE: 130 MMHG | DIASTOLIC BLOOD PRESSURE: 88 MMHG | BODY MASS INDEX: 19.41 KG/M2 | HEIGHT: 61 IN | WEIGHT: 102.8 LBS | OXYGEN SATURATION: 97 % | TEMPERATURE: 97.1 F

## 2023-01-09 DIAGNOSIS — E55.9 VITAMIN D DEFICIENCY: ICD-10-CM

## 2023-01-09 DIAGNOSIS — R63.4 UNINTENTIONAL WEIGHT LOSS: Primary | ICD-10-CM

## 2023-01-09 DIAGNOSIS — F41.9 ANXIETY: ICD-10-CM

## 2023-01-09 DIAGNOSIS — F33.1 MODERATE EPISODE OF RECURRENT MAJOR DEPRESSIVE DISORDER: ICD-10-CM

## 2023-01-09 DIAGNOSIS — E53.8 B12 DEFICIENCY: ICD-10-CM

## 2023-01-09 PROCEDURE — 36415 COLL VENOUS BLD VENIPUNCTURE: CPT | Performed by: FAMILY MEDICINE

## 2023-01-09 PROCEDURE — 99215 OFFICE O/P EST HI 40 MIN: CPT | Performed by: FAMILY MEDICINE

## 2023-01-09 RX ORDER — CLONAZEPAM 0.12 MG/1
0.12 TABLET, ORALLY DISINTEGRATING ORAL NIGHTLY PRN
Qty: 30 TABLET | Refills: 1 | Status: SHIPPED | OUTPATIENT
Start: 2023-01-09 | End: 2023-03-06 | Stop reason: ALTCHOICE

## 2023-01-09 NOTE — ASSESSMENT & PLAN NOTE
Patient continues to taper off of Klonopin.  She was prescribed 0.125 mg to take at bedtime.  Side effects discussed

## 2023-01-09 NOTE — ASSESSMENT & PLAN NOTE
Patient will have lab work done today.    Spent time reviewing recent CT and axillary lymph node from about 6 months ago both of which were normal I suspect this is likely multifactorial to include her drinking several diet Snapple drinks daily which do not have any calories and becoming full from the liquid intake.  I have encouraged her to replace 2 of these drinks with protein shakes in between her 3 meals daily.  I have also encouraged her to increase protein intake in general and decrease simple sugars and starches.    I spent 45 minutes with the patient and her family member discussing past and current health issues and symptoms, treatment and recommendations going forward, reviewing previous medical records, and on documentation.

## 2023-01-09 NOTE — ASSESSMENT & PLAN NOTE
Patient's depression is recurrent and is moderate without psychosis. Their depression is currently active and the condition is worsening. This will be reassessed In 3 weeks. F/U as described:Patient will restart Zoloft 50 mg daily.  Side effects discussed.

## 2023-01-10 LAB
25(OH)D3+25(OH)D2 SERPL-MCNC: 89.2 NG/ML (ref 30–100)
ALBUMIN SERPL-MCNC: 4.7 G/DL (ref 3.7–4.7)
ALBUMIN/GLOB SERPL: 2.8 {RATIO} (ref 1.2–2.2)
ALP SERPL-CCNC: 103 IU/L (ref 44–121)
ALT SERPL-CCNC: 23 IU/L (ref 0–32)
AST SERPL-CCNC: 26 IU/L (ref 0–40)
BASOPHILS # BLD AUTO: 0 X10E3/UL (ref 0–0.2)
BASOPHILS NFR BLD AUTO: 0 %
BILIRUB SERPL-MCNC: 0.4 MG/DL (ref 0–1.2)
BUN SERPL-MCNC: 8 MG/DL (ref 8–27)
BUN/CREAT SERPL: 14 (ref 12–28)
CALCIUM SERPL-MCNC: 9.4 MG/DL (ref 8.7–10.3)
CHLORIDE SERPL-SCNC: 97 MMOL/L (ref 96–106)
CO2 SERPL-SCNC: 28 MMOL/L (ref 20–29)
CREAT SERPL-MCNC: 0.59 MG/DL (ref 0.57–1)
EGFRCR SERPLBLD CKD-EPI 2021: 96 ML/MIN/1.73
EOSINOPHIL # BLD AUTO: 0 X10E3/UL (ref 0–0.4)
EOSINOPHIL NFR BLD AUTO: 0 %
ERYTHROCYTE [DISTWIDTH] IN BLOOD BY AUTOMATED COUNT: 11.9 % (ref 11.7–15.4)
GLOBULIN SER CALC-MCNC: 1.7 G/DL (ref 1.5–4.5)
GLUCOSE SERPL-MCNC: 113 MG/DL (ref 70–99)
HCT VFR BLD AUTO: 41.1 % (ref 34–46.6)
HGB BLD-MCNC: 13.7 G/DL (ref 11.1–15.9)
IMM GRANULOCYTES # BLD AUTO: 0 X10E3/UL (ref 0–0.1)
IMM GRANULOCYTES NFR BLD AUTO: 0 %
LYMPHOCYTES # BLD AUTO: 2 X10E3/UL (ref 0.7–3.1)
LYMPHOCYTES NFR BLD AUTO: 30 %
MCH RBC QN AUTO: 32.2 PG (ref 26.6–33)
MCHC RBC AUTO-ENTMCNC: 33.3 G/DL (ref 31.5–35.7)
MCV RBC AUTO: 97 FL (ref 79–97)
MONOCYTES # BLD AUTO: 0.4 X10E3/UL (ref 0.1–0.9)
MONOCYTES NFR BLD AUTO: 7 %
NEUTROPHILS # BLD AUTO: 4.2 X10E3/UL (ref 1.4–7)
NEUTROPHILS NFR BLD AUTO: 63 %
PLATELET # BLD AUTO: 278 X10E3/UL (ref 150–450)
POTASSIUM SERPL-SCNC: 4.4 MMOL/L (ref 3.5–5.2)
PROT SERPL-MCNC: 6.4 G/DL (ref 6–8.5)
RBC # BLD AUTO: 4.25 X10E6/UL (ref 3.77–5.28)
SODIUM SERPL-SCNC: 139 MMOL/L (ref 134–144)
T4 FREE SERPL-MCNC: 1.27 NG/DL (ref 0.82–1.77)
TSH SERPL DL<=0.005 MIU/L-ACNC: 0.6 UIU/ML (ref 0.45–4.5)
VIT B12 SERPL-MCNC: 1362 PG/ML (ref 232–1245)
WBC # BLD AUTO: 6.8 X10E3/UL (ref 3.4–10.8)

## 2023-01-27 ENCOUNTER — OFFICE VISIT (OUTPATIENT)
Dept: FAMILY MEDICINE CLINIC | Facility: CLINIC | Age: 73
End: 2023-01-27
Payer: MEDICARE

## 2023-01-27 VITALS
HEART RATE: 81 BPM | OXYGEN SATURATION: 95 % | SYSTOLIC BLOOD PRESSURE: 132 MMHG | WEIGHT: 102 LBS | DIASTOLIC BLOOD PRESSURE: 68 MMHG | BODY MASS INDEX: 19.26 KG/M2 | TEMPERATURE: 97.7 F | HEIGHT: 61 IN

## 2023-01-27 DIAGNOSIS — R63.4 UNINTENTIONAL WEIGHT LOSS: ICD-10-CM

## 2023-01-27 DIAGNOSIS — F33.1 MODERATE EPISODE OF RECURRENT MAJOR DEPRESSIVE DISORDER: Primary | ICD-10-CM

## 2023-01-27 PROCEDURE — 99213 OFFICE O/P EST LOW 20 MIN: CPT | Performed by: FAMILY MEDICINE

## 2023-01-27 NOTE — ASSESSMENT & PLAN NOTE
Patient's depression is recurrent and is moderate without psychosis. Their depression is currently active and the condition is unchanged. This will be reassessed In 6 weeks. F/U as described:patient will continue current medication therapy and I would like her to continue on Zoloft 50 mg daily since she has only been on the increased dose for 1 week.  We will reassess the need to increase at the next visit.   EDWARD:'[id number]:MIIS:[id number]',EDWARD:'[id number]'

## 2023-01-27 NOTE — ASSESSMENT & PLAN NOTE
Although she has not gained weight, she has maintained her weight which I am happy about.  I have encouraged her to continue using the protein bars, eliminating her Snapple drinks, and increasing protein intake in her diet.

## 2023-01-27 NOTE — PROGRESS NOTES
Date: 2023   Patient Name: Skye De Oliveira  : 1950   MRN: [id number]     Chief Complaint:    Chief Complaint   Patient presents with   • Weight Check       History of Present Illness: Skye De Oliveira is a 72 y.o. female who is here today to follow up for Weight loss and depression.  Although she has not gained any weight, she has not lost any weight from her last visit.  She was unable to tolerate protein shakes so she is added to protein bars to her diet daily.  She has eliminated 2 of her Snapple drinks throughout the day as well.    She has not seen a big improvement with the Zoloft although initially she had some nausea so she has only been on the full 50 mg dose for about 1 week.  She denies other side effects to the medication.           Review of Systems:   Review of Systems   Constitutional: Positive for fatigue. Negative for chills and fever.   Respiratory: Negative for cough and shortness of breath.    Cardiovascular: Negative for chest pain and palpitations.   Gastrointestinal: Negative for abdominal pain, constipation, diarrhea, nausea and vomiting.   Musculoskeletal: Negative for back pain and myalgias.   Neurological: Negative for dizziness and headache.   Psychiatric/Behavioral: Positive for sleep disturbance, depressed mood and stress. The patient is not nervous/anxious.        Past Medical History:   Past Medical History:   Diagnosis Date   • Abnormal EKG 2022   • Adenomatous polyp of colon    • Age-related osteoporosis without current pathological fracture    • Anxiety    • Chronic kidney disease     STAGE 2   • Costochondral chest pain    • Depression    • DJD (degenerative joint disease)     MULTIPLE JOINTS   • GERD without esophagitis    • H/O mammogram 2016    [facility]   • High risk medication use    • History of COVID-19 2021    no hospital no steroids   • Hypokalemia    • Major depression in remission (HCC)    • PONV (postoperative nausea and vomiting)    •  Primary insomnia    • Primary osteoarthritis involving multiple joints    • S/P bariatric surgery    • Thyroid nodule     MULTIPLE   • Vitamin D deficiency        Past Surgical History:   Past Surgical History:   Procedure Laterality Date   • ABDOMINOPLASTY  2007   • APPENDECTOMY     • BLADDER REPAIR  2011    [facility]   • BREAST SURGERY  2008    REMOVED IMPLANTS AND REMOVED MORE FIROIDS   • CATARACT EXTRACTION Bilateral    • CHOLECYSTECTOMY  2014    s/p lap elicia/ VLAD for partial SBO by Dr. Booker 2/17/14.   • COLONOSCOPY  2017   • ENDOSCOPY     • ENDOSCOPY N/A 10/19/2022    Procedure: ESOPHAGOGASTRODUODENOSCOPY;  Surgeon: Ton Jurado MD;  Location:  [facility];  Service: General;  Laterality: N/A;   • GASTRIC BYPASS  2014    with a 120 cm Minerva limb/ HHR by Dr. Shan Booker at [facility] 1/22/14   • HAMMER TOE REPAIR     • HERNIA REPAIR     • HIATAL HERNIA REPAIR N/A 10/19/2022    Procedure: HIATAL HERNIA REPAIR LAPAROSCOPIC;  Surgeon: Ton Jurado MD;  Location:  [facility];  Service: General;  Laterality: N/A;   • HIP EXAMINATION UNDER ANESTHESIA Left     gluteus medius repair   • HYSTERECTOMY  1974    AND UILATERAL OOPHHORETOMY   • KNEE SURGERY Left 2010    arthroscopy debridement   • MASTOPEXY Bilateral 1982    WITH IMPLANT RECONSTRUCTION   • NISSEN FUNDOPLICATION N/A 10/19/2022    Procedure: NISSEN FUNDOPLICATION LAPAROSCOPIC;  Surgeon: Ton Jurado MD;  Location:  [facility];  Service: General;  Laterality: N/A;   • OVARY SURGERY Right 1998    REMOVED   • REFRACTIVE SURGERY Bilateral    • SHOULDER SURGERY Bilateral     ROTATOR CUFF  RIGHT 2000 LEFT 2001   • TONSILLECTOMY  1974    ADENOIODECTOMY   • VOCAL CORD BIOPSY  1992    POLYPS REMOVED       Family History:   Family History   Problem Relation Age of Onset   • Lung cancer Mother    • Arthritis Mother    • Cancer Mother         Lung cancer   • Lung cancer Father 49   • Cancer Father         Lung cancer  "  • Diabetes Brother    • Hearing loss Brother    • Hypertension Brother    • Breast cancer Neg Hx    • Ovarian cancer Neg Hx    • Endometrial cancer Neg Hx        Social History:   Social History     Socioeconomic History   • Marital status:    Tobacco Use   • Smoking status: Never   • Smokeless tobacco: Never   Vaping Use   • Vaping Use: Never used   Substance and Sexual Activity   • Alcohol use: No   • Drug use: No   • Sexual activity: Not Currently     Partners: Male     Comment: no hormones       Medications:     Current Outpatient Medications:   •  clonazePAM (KlonoPIN) 0.125 MG disintegrating tablet, Place 1 tablet on the tongue At Night As Needed for Anxiety. OK to fill 1/8/23-tapering off medication, Disp: 30 tablet, Rfl: 1  •  Daridorexant HCl (Quviviq) 50 MG tablet, Take 1 tablet by mouth Every Night., Disp: 30 tablet, Rfl: 5  •  melatonin 5 MG tablet tablet, Take 5 mg by mouth At Night As Needed., Disp: , Rfl:   •  multivitamin (THERAGRAN) tablet tablet, Take 1 tablet by mouth Daily., Disp: , Rfl:   •  Natural Vitamin D-3 125 MCG (5000 UT) tablet, TAKE ONE TABLET BY MOUTH EVERY EVENING, Disp: 60 tablet, Rfl: 5  •  omeprazole (priLOSEC) 40 MG capsule, TAKE 1 CAPSULE BY MOUTH EVERY DAY BEFORE A MEAL, Disp: 90 capsule, Rfl: 0  •  sertraline (Zoloft) 50 MG tablet, Take 1 tablet by mouth Daily., Disp: 30 tablet, Rfl: 5  •  traMADol (ULTRAM) 50 MG tablet, Take 1 tablet by mouth Daily., Disp: 30 tablet, Rfl: 2    Allergies:   Allergies   Allergen Reactions   • Penicillins Hives     Tolerates cephalospins well         PHQ-2 Total Score:     PHQ-9 Total Score:       Physical Exam:  Vital Signs:   Vitals:    01/27/23 0928   BP: 132/68   BP Location: Left arm   Patient Position: Sitting   Cuff Size: Adult   Pulse: 81   Temp: 97.7 °F (36.5 °C)   TempSrc: Temporal   SpO2: 95%   Weight: 46.3 kg (102 lb)   Height: 154.9 cm (61\")     Body mass index is 19.27 kg/m².     Physical Exam  Vitals and nursing note " reviewed.   Constitutional:       Appearance: Normal appearance.   Cardiovascular:      Rate and Rhythm: Normal rate and regular rhythm.      Heart sounds: Normal heart sounds. No murmur heard.  Pulmonary:      Effort: Pulmonary effort is normal.      Breath sounds: Normal breath sounds. No wheezing.   Abdominal:      General: Bowel sounds are normal.      Palpations: Abdomen is soft.   Neurological:      Mental Status: She is alert and oriented to person, place, and time. Mental status is at baseline.   Psychiatric:         Mood and Affect: Mood is depressed. Affect is flat.         Behavior: Behavior normal.           Assessment/Plan:   Diagnoses and all orders for this visit:    1. Moderate episode of recurrent major depressive disorder (HCC) (Primary)  Assessment & Plan:  Patient's depression is recurrent and is moderate without psychosis. Their depression is currently active and the condition is unchanged. This will be reassessed In 6 weeks. F/U as described:patient will continue current medication therapy and I would like her to continue on Zoloft 50 mg daily since she has only been on the increased dose for 1 week.  We will reassess the need to increase at the next visit.      2. Unintentional weight loss  Assessment & Plan:  Although she has not gained weight, she has maintained her weight which I am happy about.  I have encouraged her to continue using the protein bars, eliminating her Snapple drinks, and increasing protein intake in her diet.           Follow Up:   Return in about 6 weeks (around 3/10/2023) for Med Recheck.    Beverley Flores, DO  [facility]

## 2023-02-01 ENCOUNTER — TELEPHONE (OUTPATIENT)
Dept: FAMILY MEDICINE CLINIC | Facility: CLINIC | Age: 73
End: 2023-02-01

## 2023-02-01 NOTE — TELEPHONE ENCOUNTER
Caller: Skye De Oliveira    Relationship: Self    Best call back number: [phone]    What is the best time to reach you: ANY     Who are you requesting to speak with (clinical staff, provider,  specific staff member): CLINICAL     What was the call regarding: JUST WANTED US TO KNOW SHE WILL BE USING [facility] FOR REFILLS FROM NOW ON     Do you require a callback: NO

## 2023-02-07 DIAGNOSIS — F41.9 ANXIETY: ICD-10-CM

## 2023-02-07 DIAGNOSIS — N18.31 CHRONIC KIDNEY DISEASE, STAGE 3A: ICD-10-CM

## 2023-02-07 DIAGNOSIS — F33.1 MODERATE EPISODE OF RECURRENT MAJOR DEPRESSIVE DISORDER: ICD-10-CM

## 2023-02-07 DIAGNOSIS — F51.01 PRIMARY INSOMNIA: ICD-10-CM

## 2023-02-07 DIAGNOSIS — R10.11 RIGHT UPPER QUADRANT ABDOMINAL PAIN: ICD-10-CM

## 2023-02-07 RX ORDER — TRAMADOL HYDROCHLORIDE 50 MG/1
50 TABLET ORAL DAILY
Qty: 30 TABLET | Refills: 2 | OUTPATIENT
Start: 2023-02-07

## 2023-02-07 RX ORDER — OMEPRAZOLE 40 MG/1
40 CAPSULE, DELAYED RELEASE ORAL DAILY
Qty: 90 CAPSULE | Refills: 0 | Status: SHIPPED | OUTPATIENT
Start: 2023-02-07

## 2023-02-07 RX ORDER — CHOLECALCIFEROL TAB 125 MCG (5000 UNIT) 125 MCG (5000 UT)
1 TAB EVERY EVENING
Qty: 180 TABLET | Refills: 0 | Status: SHIPPED | OUTPATIENT
Start: 2023-02-07

## 2023-02-07 RX ORDER — DARIDOREXANT 50 MG/1
50 TABLET, FILM COATED ORAL NIGHTLY
Qty: 30 TABLET | Refills: 0 | OUTPATIENT
Start: 2023-02-07

## 2023-02-07 NOTE — TELEPHONE ENCOUNTER
Caller: Alvino Skye Sue    Relationship: Self    Best call back number: [phone]   Requested Prescriptions:   Requested Prescriptions     Pending Prescriptions Disp Refills   • Daridorexant HCl (Quviviq) 50 MG tablet 30 tablet 5     Sig: Take 1 tablet by mouth Every Night.   • Cholecalciferol (Natural Vitamin D-3) 125 MCG (5000 UT) tablet 60 tablet 5     Sig: Take 1 tablet by mouth Every Evening.   • omeprazole (priLOSEC) 40 MG capsule 90 capsule 0     Sig: Take 1 capsule by mouth Daily. before a meal   • sertraline (Zoloft) 50 MG tablet 30 tablet 5     Sig: Take 1 tablet by mouth Daily.   • traMADol (ULTRAM) 50 MG tablet 30 tablet 2     Sig: Take 1 tablet by mouth Daily.        Pharmacy where request should be sent: [facility] [address] [phone] [facility] [phone]      Additional details provided by patient: HAS ONE WEEK REMAINING AND REQUESTING 90 DAY SUPPLIES TO BE SENT TO [facility]     Does the patient have less than a 3 day supply:  [] Yes  [x] No    Would you like a call back once the refill request has been completed: [x] Yes [] No    If the office needs to give you a call back, can they leave a voicemail: [x] Yes [] No    Titus Mckeon Rep   02/07/23 11:37 EST

## 2023-02-09 RX ORDER — FAMOTIDINE 40 MG/1
40 TABLET, FILM COATED ORAL DAILY
Qty: 90 TABLET | Refills: 1 | Status: SHIPPED | OUTPATIENT
Start: 2023-02-09

## 2023-02-13 ENCOUNTER — TELEPHONE (OUTPATIENT)
Dept: FAMILY MEDICINE CLINIC | Facility: CLINIC | Age: 73
End: 2023-02-13
Payer: MEDICARE

## 2023-02-13 NOTE — TELEPHONE ENCOUNTER
Caller: Skye De Oliveira    Relationship: Self    Best call back number:  [phone]      What medications are you currently taking:   Current Outpatient Medications on File Prior to Visit   Medication Sig Dispense Refill   • Cholecalciferol (Natural Vitamin D-3) 125 MCG (5000 UT) tablet Take 1 tablet by mouth Every Evening. 180 tablet 0   • clonazePAM (KlonoPIN) 0.125 MG disintegrating tablet Place 1 tablet on the tongue At Night As Needed for Anxiety. OK to fill 1/8/23-tapering off medication 30 tablet 1   • Daridorexant HCl (Quviviq) 50 MG tablet Take 1 tablet by mouth Every Night. 30 tablet 5   • famotidine (PEPCID) 40 MG tablet Take 1 tablet by mouth Daily. 90 tablet 1   • melatonin 5 MG tablet tablet Take 5 mg by mouth At Night As Needed.     • multivitamin (THERAGRAN) tablet tablet Take 1 tablet by mouth Daily.     • omeprazole (priLOSEC) 40 MG capsule Take 1 capsule by mouth Daily. before a meal 90 capsule 0   • sertraline (Zoloft) 50 MG tablet Take 1 tablet by mouth Daily. 30 tablet 0   • traMADol (ULTRAM) 50 MG tablet Take 1 tablet by mouth Daily. 30 tablet 2     No current facility-administered medications on file prior to visit.         Which medication are you concerned about:     QUVIVIQ    What are your concerns:     IS IT AVAILABLE THROUGH [facility] OR DOES PATIENT HAVE TO GO BACK TO [facility].  PATIENT HAS LESS THAN THREE PILLS

## 2023-02-14 NOTE — TELEPHONE ENCOUNTER
Clled and spoke to patient, was unable to answer her question. Gave call to Armando to see if she could help.

## 2023-02-21 DIAGNOSIS — F51.01 PRIMARY INSOMNIA: ICD-10-CM

## 2023-02-21 RX ORDER — DARIDOREXANT 50 MG/1
50 TABLET, FILM COATED ORAL NIGHTLY
Qty: 30 TABLET | Refills: 5 | Status: SHIPPED | OUTPATIENT
Start: 2023-02-21 | End: 2023-02-24 | Stop reason: SDUPTHER

## 2023-02-22 DIAGNOSIS — F33.1 MODERATE EPISODE OF RECURRENT MAJOR DEPRESSIVE DISORDER: ICD-10-CM

## 2023-02-24 DIAGNOSIS — F51.01 PRIMARY INSOMNIA: ICD-10-CM

## 2023-02-24 RX ORDER — DARIDOREXANT 50 MG/1
50 TABLET, FILM COATED ORAL NIGHTLY
Qty: 90 TABLET | Refills: 0 | Status: SHIPPED | OUTPATIENT
Start: 2023-02-24 | End: 2023-03-08 | Stop reason: SDUPTHER

## 2023-02-24 NOTE — TELEPHONE ENCOUNTER
[facility] PATIENT   pt is wanting to see if a PA could be submitted to [facility] for her med, Quvivig.  She said the med does not have a generic and she would have to pay $440.  Can you please check in to this?

## 2023-02-24 NOTE — TELEPHONE ENCOUNTER
I sent in a 90-day supply given this is a controlled substance.  She will have to get additional refills from Dr. Flores in the future.  This was done given that Mark is out today on leave

## 2023-02-24 NOTE — TELEPHONE ENCOUNTER
Will you send this to [facility] so therefore it will get paid. Prior authorization approved

## 2023-03-06 ENCOUNTER — DOCUMENTATION (OUTPATIENT)
Dept: BARIATRICS/WEIGHT MGMT | Facility: CLINIC | Age: 73
End: 2023-03-06
Payer: MEDICARE

## 2023-03-06 ENCOUNTER — OFFICE VISIT (OUTPATIENT)
Dept: BARIATRICS/WEIGHT MGMT | Facility: CLINIC | Age: 73
End: 2023-03-06
Payer: MEDICARE

## 2023-03-06 VITALS
HEIGHT: 60 IN | BODY MASS INDEX: 19.93 KG/M2 | WEIGHT: 101.5 LBS | SYSTOLIC BLOOD PRESSURE: 162 MMHG | OXYGEN SATURATION: 96 % | HEART RATE: 72 BPM | RESPIRATION RATE: 16 BRPM | TEMPERATURE: 98.3 F | DIASTOLIC BLOOD PRESSURE: 90 MMHG

## 2023-03-06 DIAGNOSIS — R63.4 WEIGHT LOSS: Primary | ICD-10-CM

## 2023-03-06 DIAGNOSIS — E83.41 HYPERMAGNESEMIA: ICD-10-CM

## 2023-03-06 DIAGNOSIS — R79.0 ABNORMAL BLOOD LEVEL OF IRON: ICD-10-CM

## 2023-03-06 DIAGNOSIS — R53.83 FATIGUE, UNSPECIFIED TYPE: ICD-10-CM

## 2023-03-06 DIAGNOSIS — E53.8 VITAMIN B12 DEFICIENCY: ICD-10-CM

## 2023-03-06 DIAGNOSIS — K90.9 INTESTINAL MALABSORPTION, UNSPECIFIED TYPE: ICD-10-CM

## 2023-03-06 DIAGNOSIS — K21.9 GASTROESOPHAGEAL REFLUX DISEASE, UNSPECIFIED WHETHER ESOPHAGITIS PRESENT: ICD-10-CM

## 2023-03-06 DIAGNOSIS — E46 MALNUTRITION, UNSPECIFIED TYPE: ICD-10-CM

## 2023-03-06 PROCEDURE — 99214 OFFICE O/P EST MOD 30 MIN: CPT | Performed by: PHYSICIAN ASSISTANT

## 2023-03-06 NOTE — PROGRESS NOTES
[facility]  [address]  [address]  formerly [facility] [id number] [phone]        Patient Name:  Skye De Oliveira  :  1950      Date of Visit: 2023      Reason for Visit:   Annual Eval - 9 years postop    HPI: Skye De Oliveira is a 72 y.o. female s/p laparoscopic gastric bypass with a 120 cm Minerva limb/ HHR by Dr. Shan Booker at [facility] in [facility] 14, s/p lap elicia/ VLAD for partial SBO by Dr. Booker 14.  Transferred care to [facility] 2018 c/o chronic nausea and abd.pain since POD#1.  Now s/p lap recurrent HHR w/ remnant Nissen + lap reduction/repair of internal hernia @[facility] 10/19/22 w/ Dr. Jurado.     LOV 11/3/22 - POD#15 - doing okay.  Returns today w/ weight loss concerns.  Down 20 lbs in the last 6 months.  Fortunately weight has been stable for the last few weeks now, but she does not want to lose anymore.  Feels she eats well.  Does have an appetite.  No issues w/ dysphagia.  Reflux much improved since HHR, controlled w/ BID PPI and nightly Pepcid.  Nausea and chronic abd.pain resolved.  Bowels moving w/out issue.     Takes Tramadol BID for arthritis hand pain, says would really suffer w/out.  Otherwise avoids ASA/NSAIDS/steroids/tobacco.      Last bariatric labs 22 - full panel - MCV 99, , K 5.4, Mag 2.5.  Taking MVI, B12 2500mcg, Mag 250mg, Calcium, Vit D 5000, and Turmeric.        Past Medical History:   Diagnosis Date   • Abnormal EKG 2022   • Adenomatous polyp of colon    • Age-related osteoporosis without current pathological fracture    • Anxiety    • Chronic kidney disease     STAGE 2   • Costochondral chest pain    • Depression    • DJD (degenerative joint disease)     MULTIPLE JOINTS   • GERD without esophagitis    • H/O mammogram 2016    [facility]   • High risk medication use    • History of COVID-19 2021    no hospital no steroids   • Hypokalemia    • Major  depression in remission (HCC)    • PONV (postoperative nausea and vomiting)    • Primary insomnia    • Primary osteoarthritis involving multiple joints    • S/P bariatric surgery    • Thyroid nodule     MULTIPLE   • Vitamin D deficiency      Past Surgical History:   Procedure Laterality Date   • ABDOMINOPLASTY  2007   • APPENDECTOMY     • BLADDER REPAIR  2011    [facility]   • BREAST SURGERY  2008    REMOVED IMPLANTS AND REMOVED MORE FIROIDS   • CATARACT EXTRACTION Bilateral    • CHOLECYSTECTOMY  2014    s/p lap elicia/ VLAD for partial SBO by Dr. Booker 2/17/14.   • COLONOSCOPY  2017   • ENDOSCOPY N/A 10/19/2022    Procedure: ESOPHAGOGASTRODUODENOSCOPY;  Surgeon: Ton Jurado MD;  Location:  [facility];  Service: General;  Laterality: N/A;   • GASTRIC BYPASS  2014    with a 120 cm Minerva limb/ HHR by Dr. Shan Booker at [facility] in [facility] 1/22/14   • HAMMER TOE REPAIR     • HIATAL HERNIA REPAIR N/A 10/19/2022    Procedure: HIATAL HERNIA REPAIR LAPAROSCOPIC;  Surgeon: Ton Jurado MD;  Location:  [facility];  Service: General;  Laterality: N/A;   • HIP EXAMINATION UNDER ANESTHESIA Left     gluteus medius repair   • HYSTERECTOMY  1974    AND UILATERAL OOPHHORETOMY   • KNEE SURGERY Left 2010    arthroscopy debridement   • MASTOPEXY Bilateral 1982    WITH IMPLANT RECONSTRUCTION   • NISSEN FUNDOPLICATION N/A 10/19/2022    Procedure: NISSEN FUNDOPLICATION LAPAROSCOPIC;  Surgeon: Ton Jurado MD;  Location:  [facility];  Service: General;  Laterality: N/A;   • OVARY SURGERY Right 1998    REMOVED   • REFRACTIVE SURGERY Bilateral    • SHOULDER SURGERY Bilateral     ROTATOR CUFF  RIGHT 2000 LEFT 2001   • TONSILLECTOMY  1974    ADENOIODECTOMY   • VOCAL CORD BIOPSY  1992    POLYPS REMOVED     Outpatient Medications Marked as Taking for the 3/6/23 encounter (Office Visit) with Peggy Peña PA   Medication Sig Dispense Refill   • Cholecalciferol (Natural Vitamin D-3) 125 MCG (5000  "UT) tablet Take 1 tablet by mouth Every Evening. 180 tablet 0   • Daridorexant HCl (Quviviq) 50 MG tablet Take 1 tablet by mouth Every Night. 90 tablet 0   • famotidine (PEPCID) 40 MG tablet Take 1 tablet by mouth Daily. 90 tablet 1   • melatonin 5 MG tablet tablet Take 1 tablet by mouth At Night As Needed.     • multivitamin (THERAGRAN) tablet tablet Take 1 tablet by mouth Daily.     • omeprazole (priLOSEC) 40 MG capsule Take 1 capsule by mouth Daily. before a meal (Patient taking differently: Take 1 capsule by mouth 2 (Two) Times a Day. before a meal) 90 capsule 0   • sertraline (ZOLOFT) 50 MG tablet TAKE 1 TABLET DAILY 30 tablet 11   • traMADol (ULTRAM) 50 MG tablet Take 1 tablet by mouth Daily. (Patient taking differently: Take 1 tablet by mouth 2 (Two) Times a Day.) 30 tablet 2       Allergies   Allergen Reactions   • Penicillins Hives     Tolerates cephalospins well         Social History     Socioeconomic History   • Marital status:    Tobacco Use   • Smoking status: Never   • Smokeless tobacco: Never   Vaping Use   • Vaping Use: Never used   Substance and Sexual Activity   • Alcohol use: No   • Drug use: No   • Sexual activity: Not Currently     Partners: Male     Comment: no hormones     Social History     Social History Narrative    Works part time [place] [employer]- . Lives with .        /90 (BP Location: Left arm, Patient Position: Sitting)   Pulse 72   Temp 98.3 °F (36.8 °C)   Resp 16   Ht 152.4 cm (60\")   Wt 46 kg (101 lb 8 oz)   SpO2 96%   BMI 19.82 kg/m²       Physical Exam  Constitutional:       Appearance: She is well-developed.      Comments: wearing a mask   Eyes:      General: No scleral icterus.  Cardiovascular:      Rate and Rhythm: Normal rate.   Pulmonary:      Effort: Pulmonary effort is normal.   Musculoskeletal:         General: Normal range of motion.   Neurological:      Mental Status: She is alert.   Psychiatric:         Behavior: " Behavior is cooperative.         Judgment: Judgment normal.           Assessment:  72 y.o. female s/p lap RNY/HHR 2014 w/ Dr. Booker in [place], s/p lap recurrent HHR w/ remnant Nissen + lap reduction/repair of internal hernia @[facility] 10/19/22 w/ Dr. Jurado.         ICD-10-CM ICD-9-CM   1. Weight loss  R63.4 783.21   2. Intestinal malabsorption, unspecified type  K90.9 579.9   3. Malnutrition, unspecified type (HCC)  E46 263.9   4. Gastroesophageal reflux disease, unspecified whether esophagitis present  K21.9 530.81   5. Fatigue, unspecified type  R53.83 780.79   6. Vitamin B12 deficiency  E53.8 266.2   7. Hypermagnesemia  E83.41 275.2   8. Abnormal blood level of iron  R79.0 790.6       Plan:   Bariatric labs ordered.  Referred to dietitian for further eval/discussion.  Continue current vitamin regimen.  Add Vit B1 100mg daily.  Cautioned w/ Tramadol use - advised to continue on PPI indefinitely.  Continue to avoid ASA/NSAIDS/steroids/tobacco.  Additional input to follow.

## 2023-03-06 NOTE — PROGRESS NOTES
Bariatric Nutrition Consult     Name: Skye De Oliveira   YOB: 1950   Age: 72 y.o.   MRN: [id number]    Consult Date:   3/6/23    Surgery:         Gastric bypass                          Date of surgery:1/22/14    Patient is  9 years  post op.     Height: 152.4cm             Weight:   101lbs         Current BMI: 19.82    Weight change:  Lost 19lbs in 6 months unintentionally, would like to remain stable at this current weight    Diet history reveals:  3 meals and numerous snacks daily    Breakfast: protein bar or egg and toast    Lunch: 1/2 turkey sandwich with cheese and mustard on wheat bread, chips and pickle    Dinner: chicken/fish/shrimp, pasta, veg    Snacks: raw veg dipped in ranch, chips, 3 apples daily, pb crackers, protein bar      Drinks:  Diet snapple    Exercise/activity:  Bowling, yoga, golf. Meeting with a  this afternoon to discuss building muscle and strength    Main bariatric nutrition principles discussed and explained and queries answered. Encouraged patient to focus on     Fortifying nutrition with healthy fats--olive oil, cheese, avocado, nuts.   Consistent meals and snacks  Monitor weight at home  Queries answered        Sammie Gómez RD, LD

## 2023-03-08 ENCOUNTER — TELEPHONE (OUTPATIENT)
Dept: FAMILY MEDICINE CLINIC | Facility: CLINIC | Age: 73
End: 2023-03-08

## 2023-03-08 ENCOUNTER — OFFICE VISIT (OUTPATIENT)
Dept: FAMILY MEDICINE CLINIC | Facility: CLINIC | Age: 73
End: 2023-03-08
Payer: MEDICARE

## 2023-03-08 VITALS
DIASTOLIC BLOOD PRESSURE: 84 MMHG | WEIGHT: 103.8 LBS | BODY MASS INDEX: 20.38 KG/M2 | OXYGEN SATURATION: 97 % | HEART RATE: 93 BPM | TEMPERATURE: 97.7 F | SYSTOLIC BLOOD PRESSURE: 158 MMHG | HEIGHT: 60 IN

## 2023-03-08 DIAGNOSIS — F41.9 ANXIETY: ICD-10-CM

## 2023-03-08 DIAGNOSIS — F51.01 PRIMARY INSOMNIA: Primary | ICD-10-CM

## 2023-03-08 PROCEDURE — 99214 OFFICE O/P EST MOD 30 MIN: CPT | Performed by: FAMILY MEDICINE

## 2023-03-08 RX ORDER — RALOXIFENE HYDROCHLORIDE 60 MG/1
TABLET, FILM COATED ORAL EVERY 24 HOURS
COMMUNITY
End: 2023-03-08

## 2023-03-08 RX ORDER — MULTIPLE VITAMINS W/ MINERALS TAB 9MG-400MCG
TAB ORAL EVERY 24 HOURS
COMMUNITY

## 2023-03-08 RX ORDER — SERTRALINE HYDROCHLORIDE 100 MG/1
100 TABLET, FILM COATED ORAL DAILY
Qty: 90 TABLET | Refills: 1 | Status: SHIPPED | OUTPATIENT
Start: 2023-03-08 | End: 2023-03-08 | Stop reason: SDUPTHER

## 2023-03-08 RX ORDER — DARIDOREXANT 50 MG/1
50 TABLET, FILM COATED ORAL NIGHTLY
Qty: 90 TABLET | Refills: 1 | Status: SHIPPED | OUTPATIENT
Start: 2023-03-08

## 2023-03-08 RX ORDER — DARIDOREXANT 50 MG/1
50 TABLET, FILM COATED ORAL NIGHTLY
Qty: 90 TABLET | Refills: 1 | Status: SHIPPED | OUTPATIENT
Start: 2023-03-08 | End: 2023-03-08 | Stop reason: SDUPTHER

## 2023-03-08 RX ORDER — OMEPRAZOLE 40 MG/1
CAPSULE, DELAYED RELEASE ORAL EVERY 24 HOURS
COMMUNITY
End: 2023-03-08

## 2023-03-08 RX ORDER — TRAMADOL HYDROCHLORIDE 50 MG/1
TABLET ORAL
COMMUNITY
End: 2023-03-08

## 2023-03-08 RX ORDER — FERROUS SULFATE 325(65) MG
TABLET ORAL EVERY 12 HOURS SCHEDULED
COMMUNITY
End: 2023-03-08

## 2023-03-08 NOTE — ASSESSMENT & PLAN NOTE
Quviviq refilled.  Adverse effects discussed.  Avoid alcohol, driving, or operating machinery while taking medications.  .  Follow-up in 6 months

## 2023-03-08 NOTE — PROGRESS NOTES
Date: 2023   Patient Name: Skye De Oliveira  : 1950   MRN: [id number]     Chief Complaint:    Chief Complaint   Patient presents with   • Anxiety     Follow up on Medication        History of Present Illness: Skye De Oliveira is a 72 y.o. female who is here today to follow up for Anxiety.  She is still not noticed a difference in taking the Zoloft and reports friends and family have not noticed a big difference or improvement either.  She is open to trying to increase the medication to see if this will give her more benefit.    She requests refill on Quviviq which she reports is working well for insomnia.  She is now off Klonopin altogether.           Review of Systems:   Review of Systems   Constitutional: Negative for chills, fatigue and fever.   Respiratory: Negative for cough and shortness of breath.    Cardiovascular: Negative for chest pain and palpitations.   Gastrointestinal: Negative for abdominal pain, constipation, diarrhea, nausea and vomiting.   Musculoskeletal: Negative for back pain and myalgias.   Neurological: Negative for dizziness and headache.   Psychiatric/Behavioral: Positive for stress. Negative for depressed mood. The patient is nervous/anxious.        Past Medical History:   Past Medical History:   Diagnosis Date   • Abnormal EKG 2022   • Adenomatous polyp of colon    • Age-related osteoporosis without current pathological fracture    • Anxiety    • Chronic kidney disease     STAGE 2   • Costochondral chest pain    • Depression    • DJD (degenerative joint disease)     MULTIPLE JOINTS   • GERD without esophagitis    • H/O mammogram 2016    [facility]   • High risk medication use    • History of COVID-19 2021    no hospital no steroids   • Hypokalemia    • Major depression in remission (HCC)    • PONV (postoperative nausea and vomiting)    • Primary insomnia    • Primary osteoarthritis involving multiple joints    • S/P bariatric surgery    • Thyroid nodule      MULTIPLE   • Vitamin D deficiency        Past Surgical History:   Past Surgical History:   Procedure Laterality Date   • ABDOMINOPLASTY  2007   • APPENDECTOMY     • BLADDER REPAIR  2011    [facility]   • BREAST SURGERY  2008    REMOVED IMPLANTS AND REMOVED MORE FIROIDS   • CATARACT EXTRACTION Bilateral    • CHOLECYSTECTOMY  2014    s/p lap elicia/ VLAD for partial SBO by Dr. Booker 2/17/14.   • COLONOSCOPY  2017   • ENDOSCOPY N/A 10/19/2022    Procedure: ESOPHAGOGASTRODUODENOSCOPY;  Surgeon: Ton Jurado MD;  Location:  [facility];  Service: General;  Laterality: N/A;   • GASTRIC BYPASS  2014    with a 120 cm Minerva limb/ HHR by Dr. Shan Booker at [facility] in [facility] 1/22/14   • HAMMER TOE REPAIR     • HIATAL HERNIA REPAIR N/A 10/19/2022    Procedure: HIATAL HERNIA REPAIR LAPAROSCOPIC;  Surgeon: Ton Jurado MD;  Location:  [facility];  Service: General;  Laterality: N/A;   • HIP EXAMINATION UNDER ANESTHESIA Left     gluteus medius repair   • HYSTERECTOMY  1974    AND UILATERAL OOPHHORETOMY   • KNEE SURGERY Left 2010    arthroscopy debridement   • MASTOPEXY Bilateral 1982    WITH IMPLANT RECONSTRUCTION   • NISSEN FUNDOPLICATION N/A 10/19/2022    Procedure: NISSEN FUNDOPLICATION LAPAROSCOPIC;  Surgeon: Ton Jurado MD;  Location:  [facility];  Service: General;  Laterality: N/A;   • OVARY SURGERY Right 1998    REMOVED   • REFRACTIVE SURGERY Bilateral    • SHOULDER SURGERY Bilateral     ROTATOR CUFF  RIGHT 2000 LEFT 2001   • TONSILLECTOMY  1974    ADENOIODECTOMY   • VOCAL CORD BIOPSY  1992    POLYPS REMOVED       Family History:   Family History   Problem Relation Age of Onset   • Lung cancer Mother    • Arthritis Mother    • Cancer Mother         Lung cancer   • Lung cancer Father 49   • Cancer Father         Lung cancer   • Diabetes Brother    • Hearing loss Brother    • Hypertension Brother    • Breast cancer Neg Hx    • Ovarian cancer Neg Hx    • Endometrial cancer Neg Hx   "      Social History:   Social History     Socioeconomic History   • Marital status:    Tobacco Use   • Smoking status: Never   • Smokeless tobacco: Never   Vaping Use   • Vaping Use: Never used   Substance and Sexual Activity   • Alcohol use: No   • Drug use: No   • Sexual activity: Not Currently     Partners: Male     Comment: no hormones       Medications:     Current Outpatient Medications:   •  Cholecalciferol (Natural Vitamin D-3) 125 MCG (5000 UT) tablet, Take 1 tablet by mouth Every Evening., Disp: 180 tablet, Rfl: 0  •  Daridorexant HCl (Quviviq) 50 MG tablet, Take 1 tablet by mouth Every Night., Disp: 90 tablet, Rfl: 1  •  famotidine (PEPCID) 40 MG tablet, Take 1 tablet by mouth Daily., Disp: 90 tablet, Rfl: 1  •  melatonin 5 MG tablet tablet, Take 1 tablet by mouth At Night As Needed., Disp: , Rfl:   •  multivitamin (THERAGRAN) tablet tablet, Take 1 tablet by mouth Daily., Disp: , Rfl:   •  multivitamin with minerals tablet tablet, Daily., Disp: , Rfl:   •  omeprazole (priLOSEC) 40 MG capsule, Take 1 capsule by mouth Daily. before a meal (Patient taking differently: Take 1 capsule by mouth 2 (Two) Times a Day. before a meal), Disp: 90 capsule, Rfl: 0  •  traMADol (ULTRAM) 50 MG tablet, Take 1 tablet by mouth Daily. (Patient taking differently: Take 1 tablet by mouth 2 (Two) Times a Day.), Disp: 30 tablet, Rfl: 2  •  sertraline (Zoloft) 100 MG tablet, Take 1 tablet by mouth Daily., Disp: 90 tablet, Rfl: 1    Allergies:   Allergies   Allergen Reactions   • Penicillins Hives     Tolerates cephalospins well         PHQ-2 Total Score: 0   PHQ-9 Total Score: 0     Physical Exam:  Vital Signs:   Vitals:    03/08/23 0904   BP: 158/84   Pulse: 93   Temp: 97.7 °F (36.5 °C)   TempSrc: Temporal   SpO2: 97%   Weight: 47.1 kg (103 lb 12.8 oz)   Height: 152.4 cm (60\")     Body mass index is 20.27 kg/m².     Physical Exam  Vitals and nursing note reviewed.   Constitutional:       Appearance: Normal appearance. "   Cardiovascular:      Rate and Rhythm: Normal rate and regular rhythm.      Heart sounds: Normal heart sounds. No murmur heard.  Pulmonary:      Effort: Pulmonary effort is normal.      Breath sounds: Normal breath sounds. No wheezing.   Abdominal:      General: Bowel sounds are normal.      Palpations: Abdomen is soft.   Neurological:      Mental Status: She is alert and oriented to person, place, and time. Mental status is at baseline.   Psychiatric:         Mood and Affect: Mood normal.         Behavior: Behavior normal.           Assessment/Plan:   Diagnoses and all orders for this visit:    1. Primary insomnia (Primary)  Assessment & Plan:  Quviviq refilled.  Adverse effects discussed.  Avoid alcohol, driving, or operating machinery while taking medications.  .  Follow-up in 6 months    Orders:  -     Discontinue: Daridorexant HCl (Quviviq) 50 MG tablet; Take 1 tablet by mouth Every Night.  Dispense: 90 tablet; Refill: 1  -     Daridorexant HCl (Quviviq) 50 MG tablet; Take 1 tablet by mouth Every Night.  Dispense: 90 tablet; Refill: 1    2. Anxiety  Assessment & Plan:  Unchanged with treatment, will increase Zoloft to 100 mg daily and follow-up in 6 weeks    Orders:  -     sertraline (Zoloft) 100 MG tablet; Take 1 tablet by mouth Daily.  Dispense: 90 tablet; Refill: 1         Follow Up:   Return in about 6 weeks (around 4/19/2023) for Med Recheck.    Beverley Flores DO  [facility]

## 2023-03-08 NOTE — TELEPHONE ENCOUNTER
Caller: Skye De Oliveira    Relationship: Self    Best call back number: [phone]      Requested Prescriptions:   Requested Prescriptions     Pending Prescriptions Disp Refills   • sertraline (Zoloft) 100 MG tablet 90 tablet 1     Sig: Take 1 tablet by mouth Daily.        Pharmacy where request should be sent: [facility] - [address] [phone] [facility] [phone] FX     Additional details provided by patient: PATIENT STATES REFILL SHOULD HAVE BEEN SENT TO [facility] INSTEAD OF [employer] [facility], PLEASE RESUBMIT    Does the patient have less than a 3 day supply:  [x] Yes  [] No    Would you like a call back once the refill request has been completed: [x] Yes [] No    If the office needs to give you a call back, can they leave a voicemail: [x] Yes [] No    Titus Milan Rep   03/08/23 14:48 EST

## 2023-03-09 RX ORDER — SERTRALINE HYDROCHLORIDE 100 MG/1
100 TABLET, FILM COATED ORAL DAILY
Qty: 90 TABLET | Refills: 1 | Status: SHIPPED | OUTPATIENT
Start: 2023-03-09

## 2023-03-15 LAB
25(OH)D3+25(OH)D2 SERPL-MCNC: 81.7 NG/ML (ref 30–100)
A-TOCOPHEROL VIT E SERPL-MCNC: 15 MG/L (ref 9–29)
ALBUMIN SERPL-MCNC: 4.3 G/DL (ref 3.7–4.7)
ALBUMIN/GLOB SERPL: 3.1 {RATIO} (ref 1.2–2.2)
ALP SERPL-CCNC: 109 IU/L (ref 44–121)
ALT SERPL-CCNC: 29 IU/L (ref 0–32)
AST SERPL-CCNC: 24 IU/L (ref 0–40)
BASOPHILS # BLD AUTO: 0 X10E3/UL (ref 0–0.2)
BASOPHILS NFR BLD AUTO: 0 %
BILIRUB SERPL-MCNC: 0.3 MG/DL (ref 0–1.2)
BUN SERPL-MCNC: 7 MG/DL (ref 8–27)
BUN/CREAT SERPL: 10 (ref 12–28)
CALCIUM SERPL-MCNC: 9.2 MG/DL (ref 8.7–10.3)
CHLORIDE SERPL-SCNC: 100 MMOL/L (ref 96–106)
CO2 SERPL-SCNC: 28 MMOL/L (ref 20–29)
CREAT SERPL-MCNC: 0.68 MG/DL (ref 0.57–1)
EGFRCR SERPLBLD CKD-EPI 2021: 93 ML/MIN/1.73
EOSINOPHIL # BLD AUTO: 0.1 X10E3/UL (ref 0–0.4)
EOSINOPHIL NFR BLD AUTO: 1 %
ERYTHROCYTE [DISTWIDTH] IN BLOOD BY AUTOMATED COUNT: 12 % (ref 11.7–15.4)
FERRITIN SERPL-MCNC: 86 NG/ML (ref 15–150)
FOLATE SERPL-MCNC: >20 NG/ML
GAMMA TOCOPHEROL SERPL-MCNC: 1.5 MG/L (ref 0.5–4.9)
GLOBULIN SER CALC-MCNC: 1.4 G/DL (ref 1.5–4.5)
GLUCOSE SERPL-MCNC: 92 MG/DL (ref 70–99)
HCT VFR BLD AUTO: 38.1 % (ref 34–46.6)
HGB BLD-MCNC: 12.5 G/DL (ref 11.1–15.9)
IMM GRANULOCYTES # BLD AUTO: 0 X10E3/UL (ref 0–0.1)
IMM GRANULOCYTES NFR BLD AUTO: 0 %
IRON SERPL-MCNC: 126 UG/DL (ref 27–139)
LYMPHOCYTES # BLD AUTO: 1.2 X10E3/UL (ref 0.7–3.1)
LYMPHOCYTES NFR BLD AUTO: 20 %
MAGNESIUM SERPL-MCNC: 2.1 MG/DL (ref 1.6–2.3)
MCH RBC QN AUTO: 31.7 PG (ref 26.6–33)
MCHC RBC AUTO-ENTMCNC: 32.8 G/DL (ref 31.5–35.7)
MCV RBC AUTO: 97 FL (ref 79–97)
METHYLMALONATE SERPL-SCNC: 175 NMOL/L (ref 0–378)
MONOCYTES # BLD AUTO: 0.5 X10E3/UL (ref 0.1–0.9)
MONOCYTES NFR BLD AUTO: 8 %
NEUTROPHILS # BLD AUTO: 4.3 X10E3/UL (ref 1.4–7)
NEUTROPHILS NFR BLD AUTO: 71 %
PHOSPHATE SERPL-MCNC: 4.1 MG/DL (ref 3–4.3)
PLATELET # BLD AUTO: 252 X10E3/UL (ref 150–450)
POTASSIUM SERPL-SCNC: 5.4 MMOL/L (ref 3.5–5.2)
PREALB SERPL-MCNC: 22 MG/DL (ref 9–32)
PROT SERPL-MCNC: 5.7 G/DL (ref 6–8.5)
PTH-INTACT SERPL-MCNC: 42 PG/ML (ref 15–65)
RBC # BLD AUTO: 3.94 X10E6/UL (ref 3.77–5.28)
SODIUM SERPL-SCNC: 139 MMOL/L (ref 134–144)
VIT A SERPL-MCNC: 37.1 UG/DL (ref 22–69.5)
VIT B1 BLD-SCNC: 123.2 NMOL/L (ref 66.5–200)
WBC # BLD AUTO: 6 X10E3/UL (ref 3.4–10.8)
ZINC SERPL-MCNC: 71 UG/DL (ref 44–115)

## 2023-03-16 ENCOUNTER — OFFICE VISIT (OUTPATIENT)
Dept: FAMILY MEDICINE CLINIC | Facility: CLINIC | Age: 73
End: 2023-03-16
Payer: MEDICARE

## 2023-03-16 VITALS
OXYGEN SATURATION: 98 % | SYSTOLIC BLOOD PRESSURE: 118 MMHG | BODY MASS INDEX: 19.92 KG/M2 | WEIGHT: 105.5 LBS | HEIGHT: 61 IN | HEART RATE: 73 BPM | DIASTOLIC BLOOD PRESSURE: 76 MMHG

## 2023-03-16 DIAGNOSIS — J02.9 ACUTE PHARYNGITIS, UNSPECIFIED ETIOLOGY: Primary | ICD-10-CM

## 2023-03-16 PROCEDURE — 99213 OFFICE O/P EST LOW 20 MIN: CPT | Performed by: PHYSICIAN ASSISTANT

## 2023-03-16 PROCEDURE — 96372 THER/PROPH/DIAG INJ SC/IM: CPT | Performed by: PHYSICIAN ASSISTANT

## 2023-03-16 PROCEDURE — 1160F RVW MEDS BY RX/DR IN RCRD: CPT | Performed by: PHYSICIAN ASSISTANT

## 2023-03-16 PROCEDURE — 1159F MED LIST DOCD IN RCRD: CPT | Performed by: PHYSICIAN ASSISTANT

## 2023-03-16 RX ORDER — AZITHROMYCIN 250 MG/1
TABLET, FILM COATED ORAL
Qty: 6 TABLET | Refills: 0 | Status: SHIPPED | OUTPATIENT
Start: 2023-03-16 | End: 2023-03-21

## 2023-03-16 RX ORDER — TRIAMCINOLONE ACETONIDE 40 MG/ML
80 INJECTION, SUSPENSION INTRA-ARTICULAR; INTRAMUSCULAR ONCE
Status: COMPLETED | OUTPATIENT
Start: 2023-03-16 | End: 2023-03-16

## 2023-03-16 RX ADMIN — TRIAMCINOLONE ACETONIDE 80 MG: 40 INJECTION, SUSPENSION INTRA-ARTICULAR; INTRAMUSCULAR at 11:56

## 2023-03-16 NOTE — PROGRESS NOTES
"Chief Complaint  Sore Throat (X2 days Patient has been vaccinated for COVID. Pt tested negative for COVID yesterday)    Subjective          Skye De Oliveira presents to [facility]  History of Present Illness   patient is here today for hoarseness, sore throat, sinus pain, pressure and drainage.  Symptoms have been going on for 2 days or more and they are progressively worsening.  She did a COVID test at home yesterday and it was negative.  She is also starting to notice some ear pressure.  Objective   Vital Signs:   /76 (BP Location: Right arm)   Pulse 73   Ht 154.9 cm (61\")   Wt 47.9 kg (105 lb 8 oz)   SpO2 98%   BMI 19.93 kg/m²     Body mass index is 19.93 kg/m².    Review of Systems   Constitutional: Negative for chills, fatigue and fever.   HENT: Positive for congestion, postnasal drip, sinus pressure and sore throat. Negative for swollen glands and trouble swallowing.    Respiratory: Negative for cough and shortness of breath.    Cardiovascular: Negative for chest pain and palpitations.   Musculoskeletal: Negative for back pain and myalgias.   Neurological: Negative for dizziness and headache.   Psychiatric/Behavioral: Negative for depressed mood. The patient is not nervous/anxious.        Past History:  Medical History: has a past medical history of Abnormal EKG (09/20/2022), Adenomatous polyp of colon, Age-related osteoporosis without current pathological fracture, Anxiety, Chronic kidney disease, Costochondral chest pain, Depression, DJD (degenerative joint disease), GERD without esophagitis, H/O mammogram (05/24/2016), High risk medication use, History of COVID-19 (01/2021), Hypokalemia, Major depression in remission (HCC), PONV (postoperative nausea and vomiting), Primary insomnia, Primary osteoarthritis involving multiple joints, S/P bariatric surgery, Thyroid nodule, and Vitamin D deficiency.   Surgical History: has a past surgical history that includes " Cholecystectomy (2014); Colonoscopy (2017); Tonsillectomy (1974); Knee surgery (Left, 2010); Hammer Toe Repair; Abdominoplasty (2007); Gastric bypass (2014); Shoulder surgery (Bilateral); Ovary surgery (Right, 1998); Mastopexy (Bilateral, 1982); Breast surgery (2008); Bladder repair (2011); Vocal Cord Biopsy (1992); Hysterectomy (1974); Cataract extraction (Bilateral); Appendectomy; Refractive surgery (Bilateral); Hip examination under anesthesia (Left); Hiatal hernia repair (N/A, 10/19/2022); Nissen fundoplication (N/A, 10/19/2022); and Esophagogastroduodenoscopy (N/A, 10/19/2022).   Family History: family history includes Arthritis in her mother; Cancer in her father and mother; Diabetes in her brother; Hearing loss in her brother; Hypertension in her brother; Lung cancer in her mother; Lung cancer (age of onset: 49) in her father.   Social History: reports that she has never smoked. She has never used smokeless tobacco. She reports that she does not currently use alcohol. She reports that she does not use drugs.      Current Outpatient Medications:   •  Cholecalciferol (Natural Vitamin D-3) 125 MCG (5000 UT) tablet, Take 1 tablet by mouth Every Evening., Disp: 180 tablet, Rfl: 0  •  Daridorexant HCl (Quviviq) 50 MG tablet, Take 1 tablet by mouth Every Night., Disp: 90 tablet, Rfl: 1  •  famotidine (PEPCID) 40 MG tablet, Take 1 tablet by mouth Daily., Disp: 90 tablet, Rfl: 1  •  melatonin 5 MG tablet tablet, Take 1 tablet by mouth At Night As Needed., Disp: , Rfl:   •  multivitamin (THERAGRAN) tablet tablet, Take 1 tablet by mouth Daily., Disp: , Rfl:   •  multivitamin with minerals tablet tablet, Daily., Disp: , Rfl:   •  omeprazole (priLOSEC) 40 MG capsule, Take 1 capsule by mouth Daily. before a meal (Patient taking differently: Take 1 capsule by mouth 2 (Two) Times a Day. before a meal), Disp: 90 capsule, Rfl: 0  •  sertraline (Zoloft) 100 MG tablet, Take 1 tablet by mouth Daily., Disp: 90 tablet, Rfl: 1  •   traMADol (ULTRAM) 50 MG tablet, Take 1 tablet by mouth Daily. (Patient taking differently: Take 1 tablet by mouth 2 (Two) Times a Day.), Disp: 30 tablet, Rfl: 2  •  azithromycin (Zithromax Z-Tong) 250 MG tablet, Take 2 tablets by mouth Daily for 1 day, THEN 1 tablet Daily for 4 days., Disp: 6 tablet, Rfl: 0    Current Facility-Administered Medications:   •  triamcinolone acetonide (KENALOG-40) injection 80 mg, 80 mg, Intramuscular, Once, Eunice Conteh PA-C    Allergies: Penicillins    Physical Exam  Vitals reviewed.   Constitutional:       Appearance: Normal appearance.   HENT:      Right Ear: Tympanic membrane normal.      Left Ear: Tympanic membrane normal.      Ears:      Comments: Kullness of TM's     Nose: Congestion present.      Mouth/Throat:      Mouth: Mucous membranes are moist.      Pharynx: No oropharyngeal exudate or posterior oropharyngeal erythema.      Tonsils: No tonsillar exudate.      Comments: cobblestoning  Cardiovascular:      Rate and Rhythm: Normal rate and regular rhythm.      Heart sounds: Normal heart sounds.   Pulmonary:      Effort: Pulmonary effort is normal.      Breath sounds: Normal breath sounds.   Musculoskeletal:      Cervical back: Normal range of motion and neck supple.   Lymphadenopathy:      Cervical: No cervical adenopathy.   Neurological:      Mental Status: She is alert and oriented to person, place, and time.   Psychiatric:         Mood and Affect: Mood normal.          Result Review :                   Assessment and Plan    Diagnoses and all orders for this visit:    1. Acute pharyngitis, unspecified etiology (Primary)  Assessment & Plan:  Rx for azithromycin and giving her a steroid shot as well.  Suggested over-the-counter antihistamine and Flonase.  Call or return if symptoms persist or worsen.    Orders:  -     triamcinolone acetonide (KENALOG-40) injection 80 mg    Other orders  -     azithromycin (Zithromax Z-Tong) 250 MG tablet; Take 2 tablets by mouth Daily for  1 day, THEN 1 tablet Daily for 4 days.  Dispense: 6 tablet; Refill: 0      Follow Up   Return if symptoms worsen or fail to improve.  Patient was given instructions and counseling regarding her condition or for health maintenance advice. Please see specific information pulled into the AVS if appropriate.     Eunice Conteh PA-C

## 2023-03-16 NOTE — ASSESSMENT & PLAN NOTE
Rx for azithromycin and giving her a steroid shot as well.  Suggested over-the-counter antihistamine and Flonase.  Call or return if symptoms persist or worsen.

## 2023-04-18 ENCOUNTER — TELEMEDICINE (OUTPATIENT)
Dept: FAMILY MEDICINE CLINIC | Facility: CLINIC | Age: 73
End: 2023-04-18
Payer: MEDICARE

## 2023-04-18 VITALS — HEIGHT: 61 IN | BODY MASS INDEX: 18.5 KG/M2 | WEIGHT: 98 LBS

## 2023-04-18 DIAGNOSIS — R63.4 UNINTENTIONAL WEIGHT LOSS: Primary | ICD-10-CM

## 2023-04-21 NOTE — ASSESSMENT & PLAN NOTE
We have obtained labs, she has worked with nutrition on increasing caloric and protein intake.  She has seen bariatric surgery and no one has been able to determine etiology of persistent weight loss.  I feel that it is time to move forward with more invasive testing.  I have ordered a CT of her chest, abdomen and pelvis for further evaluation.

## 2023-04-21 NOTE — PROGRESS NOTES
Patient was seen today through synchronous audio technology. Verbal consent was obtained. The patient was located at home. Dr. Flores was located at [facility] in [place], [place]. Vitals signs were obtained by patient and recorded. Time spent with patient was 20 minutes.         Date: 2023   Patient Name: Skye De Oliveira  : 1950   MRN: [id number]     Chief Complaint:    Chief Complaint   Patient presents with   • Weight Loss     Patient has lost another 7 pounds       History of Present Illness: Skye De Oliveira is a 72 y.o. female who is here today to follow up for Unintentional weight loss.  She has struggled with weight loss significantly over the past year.  She had stabilized however reports that she has lost another 7 pounds over the last month.  She had an upper respiratory illness at the end of last month and admits that she was not eating as much because food did not taste good secondary to all of the drainage.  She feels like she is back to her baseline eating and physical activity but is not gaining any of the weight back.  She has been seen by bariatric surgery, and nutritionist, and has increased caloric and protein intake without improvement.  She is worried that something more serious could be causing this that has not been evaluated.           Review of Systems:   Review of Systems   Constitutional: Positive for fatigue and unexpected weight loss. Negative for chills and fever.   Respiratory: Negative for cough and shortness of breath.    Cardiovascular: Negative for chest pain and palpitations.   Gastrointestinal: Negative for abdominal pain, constipation, diarrhea, nausea and vomiting.   Musculoskeletal: Negative for back pain and myalgias.   Neurological: Negative for dizziness and headache.   Psychiatric/Behavioral: Positive for stress. Negative for depressed mood. The patient is nervous/anxious.        Past Medical History:   Past Medical History:    Diagnosis Date   • Abnormal EKG 09/20/2022   • Adenomatous polyp of colon    • Age-related osteoporosis without current pathological fracture    • Anxiety    • Chronic kidney disease     STAGE 2   • Costochondral chest pain    • Depression    • DJD (degenerative joint disease)     MULTIPLE JOINTS   • GERD without esophagitis    • H/O mammogram 05/24/2016    [facility]   • High risk medication use    • History of COVID-19 01/2021    no hospital no steroids   • Hypokalemia    • Major depression in remission    • PONV (postoperative nausea and vomiting)    • Primary insomnia    • Primary osteoarthritis involving multiple joints    • S/P bariatric surgery    • Thyroid nodule     MULTIPLE   • Vitamin D deficiency        Past Surgical History:   Past Surgical History:   Procedure Laterality Date   • ABDOMINOPLASTY  2007   • APPENDECTOMY     • BLADDER REPAIR  2011    [facility]   • BREAST SURGERY  2008    REMOVED IMPLANTS AND REMOVED MORE FIROIDS   • CATARACT EXTRACTION Bilateral    • CHOLECYSTECTOMY  2014    s/p lap elicia/ VLAD for partial SBO by Dr. Booker 2/17/14.   • COLONOSCOPY  2017   • ENDOSCOPY N/A 10/19/2022    Procedure: ESOPHAGOGASTRODUODENOSCOPY;  Surgeon: Ton Jurado MD;  Location:  [facility];  Service: General;  Laterality: N/A;   • GASTRIC BYPASS  2014    with a 120 cm Minerva limb/ HHR by Dr. Shan Booker at [facility] in [facility] 1/22/14   • HAMMER TOE REPAIR     • HIATAL HERNIA REPAIR N/A 10/19/2022    Procedure: HIATAL HERNIA REPAIR LAPAROSCOPIC;  Surgeon: Ton Jurado MD;  Location:  [facility];  Service: General;  Laterality: N/A;   • HIP EXAMINATION UNDER ANESTHESIA Left     gluteus medius repair   • HYSTERECTOMY  1974    AND UILATERAL OOPHHORETOMY   • KNEE SURGERY Left 2010    arthroscopy debridement   • MASTOPEXY Bilateral 1982    WITH IMPLANT RECONSTRUCTION   • NISSEN FUNDOPLICATION N/A 10/19/2022    Procedure: NISSEN FUNDOPLICATION LAPAROSCOPIC;  Surgeon: Ton Jurado  MD Larry;  [facility];  Service: General;  Laterality: N/A;   • OVARY SURGERY Right 1998    REMOVED   • REFRACTIVE SURGERY Bilateral    • SHOULDER SURGERY Bilateral     ROTATOR CUFF  RIGHT 2000 LEFT 2001   • TONSILLECTOMY  1974    ADENOIODECTOMY   • VOCAL CORD BIOPSY  1992    POLYPS REMOVED       Family History:   Family History   Problem Relation Age of Onset   • Lung cancer Mother    • Arthritis Mother    • Cancer Mother         Lung cancer   • Lung cancer Father 49   • Cancer Father         Lung cancer   • Diabetes Brother    • Hearing loss Brother    • Hypertension Brother    • Breast cancer Neg Hx    • Ovarian cancer Neg Hx    • Endometrial cancer Neg Hx        Social History:   Social History     Socioeconomic History   • Marital status:    Tobacco Use   • Smoking status: Never   • Smokeless tobacco: Never   Vaping Use   • Vaping Use: Never used   Substance and Sexual Activity   • Alcohol use: Not Currently   • Drug use: Never   • Sexual activity: Not Currently     Partners: Male     Comment: no hormones       Medications:     Current Outpatient Medications:   •  Cholecalciferol (Natural Vitamin D-3) 125 MCG (5000 UT) tablet, Take 1 tablet by mouth Every Evening., Disp: 180 tablet, Rfl: 0  •  Daridorexant HCl (Quviviq) 50 MG tablet, Take 1 tablet by mouth Every Night., Disp: 90 tablet, Rfl: 1  •  famotidine (PEPCID) 40 MG tablet, Take 1 tablet by mouth Daily., Disp: 90 tablet, Rfl: 1  •  melatonin 5 MG tablet tablet, Take 1 tablet by mouth At Night As Needed., Disp: , Rfl:   •  multivitamin (THERAGRAN) tablet tablet, Take 1 tablet by mouth Daily., Disp: , Rfl:   •  omeprazole (priLOSEC) 40 MG capsule, Take 1 capsule by mouth Daily. before a meal, Disp: 90 capsule, Rfl: 0  •  sertraline (Zoloft) 100 MG tablet, Take 1 tablet by mouth Daily., Disp: 90 tablet, Rfl: 1  •  traMADol (ULTRAM) 50 MG tablet, Take 1 tablet by mouth Daily., Disp: 30 tablet, Rfl: 2    Allergies:   Allergies   Allergen  "Reactions   • Penicillins Hives     Tolerates cephalospins well         PHQ-2 Total Score:     PHQ-9 Total Score:       Physical Exam:  Vital Signs:   Vitals:    04/18/23 0924   Weight: 44.5 kg (98 lb)  Comment: PT REPORTED   Height: 154.9 cm (61\")  Comment: PT REPORTED     Body mass index is 18.52 kg/m².     Physical Exam  Vitals and nursing note reviewed.   Pulmonary:      Effort: Pulmonary effort is normal.   Neurological:      Mental Status: She is alert and oriented to person, place, and time.   Psychiatric:         Mood and Affect: Mood normal.           Assessment/Plan:   Diagnoses and all orders for this visit:    1. Unintentional weight loss (Primary)  Assessment & Plan:  We have obtained labs, she has worked with nutrition on increasing caloric and protein intake.  She has seen bariatric surgery and no one has been able to determine etiology of persistent weight loss.  I feel that it is time to move forward with more invasive testing.  I have ordered a CT of her chest, abdomen and pelvis for further evaluation.    Orders:  -     CT Chest Without Contrast; Future  -     CT Abdomen Pelvis Without Contrast; Future         Follow Up:   No follow-ups on file.    Beverley Flores, DO  [facility] [place] [facility]    "

## 2023-05-03 RX ORDER — OMEPRAZOLE 40 MG/1
CAPSULE, DELAYED RELEASE ORAL
Qty: 90 CAPSULE | Refills: 3 | Status: SHIPPED | OUTPATIENT
Start: 2023-05-03

## 2023-08-21 DIAGNOSIS — F41.9 ANXIETY: ICD-10-CM

## 2023-08-21 RX ORDER — SERTRALINE HYDROCHLORIDE 100 MG/1
100 TABLET, FILM COATED ORAL DAILY
Qty: 90 TABLET | Refills: 1 | Status: SHIPPED | OUTPATIENT
Start: 2023-08-21

## 2023-08-21 NOTE — TELEPHONE ENCOUNTER
Caller: Skye De Oliveira    Relationship: Self    Best call back number: [phone]     Requested Prescriptions:   Requested Prescriptions     Pending Prescriptions Disp Refills    sertraline (Zoloft) 100 MG tablet 90 tablet 1     Sig: Take 1 tablet by mouth Daily.        Pharmacy where request should be sent: [facility] [address] [phone] [facility] [phone] FX     Last office visit with prescribing clinician: 3/8/2023   Last telemedicine visit with prescribing clinician: 4/18/2023   Next office visit with prescribing clinician: Visit date not found     Titus Randle Rep   08/21/23 10:19 EDT

## 2023-08-30 ENCOUNTER — OFFICE VISIT (OUTPATIENT)
Dept: FAMILY MEDICINE CLINIC | Facility: CLINIC | Age: 73
End: 2023-08-30
Payer: MEDICARE

## 2023-08-30 VITALS
OXYGEN SATURATION: 94 % | HEART RATE: 74 BPM | WEIGHT: 102 LBS | BODY MASS INDEX: 19.27 KG/M2 | DIASTOLIC BLOOD PRESSURE: 80 MMHG | SYSTOLIC BLOOD PRESSURE: 160 MMHG

## 2023-08-30 DIAGNOSIS — H69.83 DYSFUNCTION OF BOTH EUSTACHIAN TUBES: Primary | ICD-10-CM

## 2023-08-30 PROBLEM — H69.93 DYSFUNCTION OF BOTH EUSTACHIAN TUBES: Status: ACTIVE | Noted: 2023-08-30

## 2023-08-30 PROCEDURE — 1160F RVW MEDS BY RX/DR IN RCRD: CPT | Performed by: PHYSICIAN ASSISTANT

## 2023-08-30 PROCEDURE — 99213 OFFICE O/P EST LOW 20 MIN: CPT | Performed by: PHYSICIAN ASSISTANT

## 2023-08-30 PROCEDURE — 1159F MED LIST DOCD IN RCRD: CPT | Performed by: PHYSICIAN ASSISTANT

## 2023-08-30 RX ORDER — FLUTICASONE PROPIONATE 50 MCG
2 SPRAY, SUSPENSION (ML) NASAL DAILY
Qty: 11.1 G | Refills: 3 | Status: SHIPPED | OUTPATIENT
Start: 2023-08-30

## 2023-08-30 RX ORDER — TRIAMCINOLONE ACETONIDE 40 MG/ML
80 INJECTION, SUSPENSION INTRA-ARTICULAR; INTRAMUSCULAR ONCE
Status: COMPLETED | OUTPATIENT
Start: 2023-08-30 | End: 2023-08-30

## 2023-08-30 RX ADMIN — TRIAMCINOLONE ACETONIDE 80 MG: 40 INJECTION, SUSPENSION INTRA-ARTICULAR; INTRAMUSCULAR at 16:38

## 2023-08-30 NOTE — PROGRESS NOTES
"Chief Complaint  Earache (Left ear pain ), Chills, and Fever (Started two days ago )    Subjective        Skye De Oliveira presents to [facility]  History of Present Illness  Patient reports today secondary to having ear pain for the past 5 days.  Patient reports she has not taken any medication so far.  Patient reports feeling like there is water in her ears and she has pressure.  Patient reports sore throat and today she is hoarse.  Patient denies any cough, shortness of breath or difficulty breathing.  Earache     Chills  Associated symptoms include chills and a fever.   Fever   Associated symptoms include ear pain.     Objective   Vital Signs:  /80   Pulse 74   Wt 46.3 kg (102 lb)   SpO2 94%   BMI 19.27 kg/mý   Estimated body mass index is 19.27 kg/mý as calculated from the following:    Height as of 4/18/23: 154.9 cm (61\").    Weight as of this encounter: 46.3 kg (102 lb).       BMI is within normal parameters. No other follow-up for BMI required.      Physical Exam  Vitals and nursing note reviewed.   Constitutional:       General: She is not in acute distress.     Appearance: Normal appearance.   HENT:      Head: Normocephalic.      Right Ear: Hearing and ear canal normal.      Left Ear: Hearing and ear canal normal.      Ears:      Comments: Bilateral moderate effusion     Mouth/Throat:      Comments: Postnasal drainage  Eyes:      Pupils: Pupils are equal, round, and reactive to light.   Cardiovascular:      Rate and Rhythm: Normal rate and regular rhythm.   Pulmonary:      Effort: Pulmonary effort is normal. No respiratory distress.   Skin:     General: Skin is warm and dry.   Neurological:      Mental Status: She is alert.   Psychiatric:         Mood and Affect: Mood normal.      Result Review :                   Assessment and Plan   Diagnoses and all orders for this visit:    1. Dysfunction of both eustachian tubes (Primary)  Assessment & Plan:  This is likely " secondary to seasonal allergies.  Patient prescribed Flonase and provided with steroid injection this date.  Advised her to increase fluids as tolerated.  Call if any problems arise    Orders:  -     triamcinolone acetonide (KENALOG-40) injection 80 mg  -     fluticasone (FLONASE) 50 MCG/ACT nasal spray; 2 sprays into the nostril(s) as directed by provider Daily.  Dispense: 11.1 g; Refill: 3             Follow Up   Return if symptoms worsen or fail to improve.  Patient was given instructions and counseling regarding her condition or for health maintenance advice. Please see specific information pulled into the AVS if appropriate.

## 2023-08-30 NOTE — ASSESSMENT & PLAN NOTE
This is likely secondary to seasonal allergies.  Patient prescribed Flonase and provided with steroid injection this date.  Advised her to increase fluids as tolerated.  Call if any problems arise

## 2023-10-05 ENCOUNTER — OFFICE VISIT (OUTPATIENT)
Dept: FAMILY MEDICINE CLINIC | Facility: CLINIC | Age: 73
End: 2023-10-05
Payer: MEDICARE

## 2023-10-05 VITALS
HEART RATE: 94 BPM | SYSTOLIC BLOOD PRESSURE: 122 MMHG | DIASTOLIC BLOOD PRESSURE: 72 MMHG | OXYGEN SATURATION: 97 % | BODY MASS INDEX: 20.18 KG/M2 | WEIGHT: 106.8 LBS

## 2023-10-05 DIAGNOSIS — R23.2 FLUSHING REACTION: Primary | ICD-10-CM

## 2023-10-05 PROCEDURE — 99212 OFFICE O/P EST SF 10 MIN: CPT | Performed by: PHYSICIAN ASSISTANT

## 2023-10-05 PROCEDURE — 1160F RVW MEDS BY RX/DR IN RCRD: CPT | Performed by: PHYSICIAN ASSISTANT

## 2023-10-05 PROCEDURE — 1159F MED LIST DOCD IN RCRD: CPT | Performed by: PHYSICIAN ASSISTANT

## 2023-10-05 NOTE — PROGRESS NOTES
"Chief Complaint  Fever (X1 yr)    Subjective          Skye De Oliveira presents to [facility]  Fever   Associated symptoms include ear pain. Pertinent negatives include no chest pain, congestion, coughing, sore throat or urinary pain.   patient is being seen today to discuss \"episodes only in the evening when she feels flushed and she takes her temp and it is ranging .2.  She doesn't feel bad and it is always transient.  She wonders if it is due to having had COVID in August.  She wanted to make sure she didn't still have some sort of infection.  She has not sinus symptoms, ear pain, cough, sore throat, no skin wounds or infections, and no urinary or lung issues.     Objective   Vital Signs:   /72 (BP Location: Right arm)   Pulse 94   Wt 48.4 kg (106 lb 12.8 oz)   SpO2 97%   BMI 20.18 kg/m²     Body mass index is 20.18 kg/m².    Review of Systems   Constitutional:  Positive for fever. Negative for chills and fatigue.   HENT:  Positive for ear pain. Negative for congestion, ear discharge, postnasal drip, rhinorrhea, sinus pressure, sore throat, swollen glands and trouble swallowing.    Respiratory:  Negative for cough and shortness of breath.    Cardiovascular:  Negative for chest pain and palpitations.   Genitourinary:  Negative for dysuria, frequency, pelvic pressure and urgency.   Musculoskeletal:  Negative for back pain and myalgias.   Neurological:  Negative for dizziness and headache.   Psychiatric/Behavioral:  Negative for depressed mood. The patient is not nervous/anxious.      Past History:  Medical History: has a past medical history of Abnormal EKG (09/20/2022), Adenomatous polyp of colon, Age-related osteoporosis without current pathological fracture, Anxiety, Chronic kidney disease, Costochondral chest pain, Depression, DJD (degenerative joint disease), GERD without esophagitis, H/O mammogram (05/24/2016), High risk medication use, History of COVID-19 " (01/2021), Hypokalemia, Major depression in remission, PONV (postoperative nausea and vomiting), Primary insomnia, Primary osteoarthritis involving multiple joints, S/P bariatric surgery, Thyroid nodule, and Vitamin D deficiency.   Surgical History: has a past surgical history that includes Cholecystectomy (2014); Colonoscopy (2017); Tonsillectomy (1974); Knee surgery (Left, 2010); Hammer Toe Repair; Abdominoplasty (2007); Gastric bypass (2014); Shoulder surgery (Bilateral); Ovary surgery (Right, 1998); Mastopexy (Bilateral, 1982); Breast surgery (2008); Bladder repair (2011); Vocal Cord Biopsy (1992); Hysterectomy (1974); Cataract extraction (Bilateral); Appendectomy; Refractive surgery (Bilateral); Hip examination under anesthesia (Left); Hiatal hernia repair (N/A, 10/19/2022); Nissen fundoplication (N/A, 10/19/2022); and Esophagogastroduodenoscopy (N/A, 10/19/2022).   Family History: family history includes Arthritis in her mother; Cancer in her father and mother; Diabetes in her brother; Hearing loss in her brother; Hypertension in her brother; Lung cancer in her mother; Lung cancer (age of onset: 49) in her father.   Social History: reports that she has never smoked. She has never used smokeless tobacco. She reports that she does not currently use alcohol. She reports that she does not use drugs.      Current Outpatient Medications:     Cholecalciferol (Natural Vitamin D-3) 125 MCG (5000 UT) tablet, Take 1 tablet by mouth Every Evening., Disp: 180 tablet, Rfl: 0    Daridorexant HCl (Quviviq) 50 MG tablet, Take 1 tablet by mouth Every Night., Disp: 90 tablet, Rfl: 1    famotidine (PEPCID) 40 MG tablet, TAKE 1 TABLET DAILY, Disp: 90 tablet, Rfl: 3    fluticasone (FLONASE) 50 MCG/ACT nasal spray, 2 sprays into the nostril(s) as directed by provider Daily., Disp: 11.1 g, Rfl: 3    melatonin 5 MG tablet tablet, Take 1 tablet by mouth At Night As Needed., Disp: , Rfl:     multivitamin (THERAGRAN) tablet tablet, Take  1 tablet by mouth Daily., Disp: , Rfl:     omeprazole (priLOSEC) 40 MG capsule, TAKE 1 CAPSULE DAILY BEFORE A MEAL, Disp: 90 capsule, Rfl: 3    sertraline (Zoloft) 100 MG tablet, Take 1 tablet by mouth Daily., Disp: 90 tablet, Rfl: 1    traMADol (ULTRAM) 50 MG tablet, Take 1 tablet by mouth Daily., Disp: 30 tablet, Rfl: 2    Allergies: Penicillins    Physical Exam  Vitals reviewed.   Constitutional:       Appearance: Normal appearance.   HENT:      Head: Normocephalic and atraumatic.      Right Ear: Tympanic membrane, ear canal and external ear normal.      Left Ear: Tympanic membrane, ear canal and external ear normal.      Nose: Nose normal. No congestion or rhinorrhea.      Mouth/Throat:      Mouth: Mucous membranes are moist.      Pharynx: No oropharyngeal exudate or posterior oropharyngeal erythema.   Eyes:      Extraocular Movements: Extraocular movements intact.      Pupils: Pupils are equal, round, and reactive to light.   Cardiovascular:      Rate and Rhythm: Normal rate and regular rhythm.      Heart sounds: Normal heart sounds.   Pulmonary:      Effort: Pulmonary effort is normal.      Breath sounds: Normal breath sounds.   Abdominal:      General: Bowel sounds are normal.      Palpations: Abdomen is soft.      Tenderness: There is no abdominal tenderness. There is no right CVA tenderness, left CVA tenderness, guarding or rebound.   Musculoskeletal:         General: Normal range of motion.      Cervical back: Normal range of motion and neck supple.   Skin:     General: Skin is warm and dry.   Neurological:      General: No focal deficit present.      Mental Status: She is alert and oriented to person, place, and time.   Psychiatric:         Mood and Affect: Mood normal.         Behavior: Behavior normal.        Result Review :                   Assessment and Plan    Diagnoses and all orders for this visit:    1. Flushing reaction (Primary)  Assessment & Plan:  Reassurance, I don't think this is a fever,  it could be anxiety or due to medications but I suggested it was not anything to worry about and to stop taking her temperature and monitor for sings and symptoms of infection rather than being concerned about her temp unless it was above 100.5.             Follow Up   No follow-ups on file.  Patient was given instructions and counseling regarding her condition or for health maintenance advice. Please see specific information pulled into the AVS if appropriate.     Eunice Conteh PA-C

## 2023-10-05 NOTE — ASSESSMENT & PLAN NOTE
Reassurance, I don't think this is a fever, it could be anxiety or due to medications but I suggested it was not anything to worry about and to stop taking her temperature and monitor for sings and symptoms of infection rather than being concerned about her temp unless it was above 100.5.

## 2023-10-18 DIAGNOSIS — F51.01 PRIMARY INSOMNIA: ICD-10-CM

## 2023-10-18 RX ORDER — DARIDOREXANT 50 MG/1
50 TABLET, FILM COATED ORAL NIGHTLY
Qty: 90 TABLET | Refills: 1 | Status: SHIPPED | OUTPATIENT
Start: 2023-10-18

## 2023-10-18 NOTE — TELEPHONE ENCOUNTER
Caller: AlvinoSkye    Relationship: Self    Best call back number: [phone]     Requested Prescriptions:   Requested Prescriptions     Pending Prescriptions Disp Refills    Daridorexant HCl (Quviviq) 50 MG tablet 90 tablet 1     Sig: Take 1 tablet by mouth Every Night.        Pharmacy where request should be sent: [facility] [address] [phone] [facility] [phone]      Last office visit with prescribing clinician: 3/8/2023   Last telemedicine visit with prescribing clinician: Visit date not found   Next office visit with prescribing clinician: Visit date not found     Additional details provided by patient: REQUESTS REFILL.    Does the patient have less than a 3 day supply:  [x] Yes  [] No    Would you like a call back once the refill request has been completed: [x] Yes [] No    If the office needs to give you a call back, can they leave a voicemail: [x] Yes [] No    Titus Oneal Rep   10/18/23 09:49 EDT

## 2023-10-25 ENCOUNTER — TELEMEDICINE (OUTPATIENT)
Dept: FAMILY MEDICINE CLINIC | Facility: CLINIC | Age: 73
End: 2023-10-25
Payer: MEDICARE

## 2023-10-25 VITALS — WEIGHT: 103 LBS | BODY MASS INDEX: 20.22 KG/M2 | HEIGHT: 60 IN

## 2023-10-25 DIAGNOSIS — J04.0 LARYNGITIS: Primary | ICD-10-CM

## 2023-10-25 PROCEDURE — 1160F RVW MEDS BY RX/DR IN RCRD: CPT | Performed by: PHYSICIAN ASSISTANT

## 2023-10-25 PROCEDURE — 99213 OFFICE O/P EST LOW 20 MIN: CPT | Performed by: PHYSICIAN ASSISTANT

## 2023-10-25 PROCEDURE — 1159F MED LIST DOCD IN RCRD: CPT | Performed by: PHYSICIAN ASSISTANT

## 2023-10-25 NOTE — PROGRESS NOTES
"Chief Complaint  Laryngitis (X1 day)    Subjective  Patient was seen today through synchronous audio/video technology using [employer] technology. Verbal consent was obtained. For the purpose of this visit the provider is located at [facility].  The patient was located at  home . Vitals signs were not obtained due to lack of home monitoring access. Time spent with patient was 15 minutes.        Skye De Oliveira presents to [facility]  Laryngitis  Associated symptoms include swollen glands. Pertinent negatives include no chest pain, chills, congestion, coughing, fatigue, fever, myalgias or sore throat.      Patient states that she began to lose her voice today and now she can barely talk in more than a whisper.  She is not experiencing trouble breathing, swallowing or throat pain.  She does note that the left side of her throat seems a little swollen.      Objective   Vital Signs:   Ht 152.4 cm (60\")   Wt 46.7 kg (103 lb) Comment: Pt reported vitals  BMI 20.12 kg/m²     Body mass index is 20.12 kg/m².    Review of Systems   Constitutional:  Negative for chills, fatigue and fever.   HENT:  Positive for swollen glands and voice change. Negative for congestion, postnasal drip, rhinorrhea, sore throat and trouble swallowing.    Respiratory:  Negative for cough and shortness of breath.    Cardiovascular:  Negative for chest pain and palpitations.   Musculoskeletal:  Negative for back pain and myalgias.   Neurological:  Negative for dizziness and headache.   Psychiatric/Behavioral:  Negative for depressed mood. The patient is not nervous/anxious.        Past History:  Medical History: has a past medical history of Abnormal EKG (09/20/2022), Adenomatous polyp of colon, Age-related osteoporosis without current pathological fracture, Anxiety, Chronic kidney disease, Costochondral chest pain, Depression, DJD (degenerative joint disease), GERD without esophagitis, H/O mammogram " (05/24/2016), High risk medication use, History of COVID-19 (01/2021), Hypokalemia, Major depression in remission, PONV (postoperative nausea and vomiting), Primary insomnia, Primary osteoarthritis involving multiple joints, S/P bariatric surgery, Thyroid nodule, and Vitamin D deficiency.   Surgical History: has a past surgical history that includes Cholecystectomy (2014); Colonoscopy (2017); Tonsillectomy (1974); Knee surgery (Left, 2010); Hammer Toe Repair; Abdominoplasty (2007); Gastric bypass (2014); Shoulder surgery (Bilateral); Ovary surgery (Right, 1998); Mastopexy (Bilateral, 1982); Breast surgery (2008); Bladder repair (2011); Vocal Cord Biopsy (1992); Hysterectomy (1974); Cataract extraction (Bilateral); Appendectomy; Refractive surgery (Bilateral); Hip examination under anesthesia (Left); Hiatal hernia repair (N/A, 10/19/2022); Nissen fundoplication (N/A, 10/19/2022); and Esophagogastroduodenoscopy (N/A, 10/19/2022).   Family History: family history includes Arthritis in her mother; Cancer in her father and mother; Diabetes in her brother; Hearing loss in her brother; Hypertension in her brother; Lung cancer in her mother; Lung cancer (age of onset: 49) in her father.   Social History: reports that she has never smoked. She has never used smokeless tobacco. She reports that she does not currently use alcohol. She reports that she does not use drugs.      Current Outpatient Medications:     Cholecalciferol (Natural Vitamin D-3) 125 MCG (5000 UT) tablet, Take 1 tablet by mouth Every Evening., Disp: 180 tablet, Rfl: 0    Daridorexant HCl (Quviviq) 50 MG tablet, Take 1 tablet by mouth Every Night., Disp: 90 tablet, Rfl: 1    famotidine (PEPCID) 40 MG tablet, TAKE 1 TABLET DAILY, Disp: 90 tablet, Rfl: 3    fluticasone (FLONASE) 50 MCG/ACT nasal spray, 2 sprays into the nostril(s) as directed by provider Daily., Disp: 11.1 g, Rfl: 3    melatonin 5 MG tablet tablet, Take 1 tablet by mouth At Night As Needed.,  Disp: , Rfl:     multivitamin (THERAGRAN) tablet tablet, Take 1 tablet by mouth Daily., Disp: , Rfl:     omeprazole (priLOSEC) 40 MG capsule, TAKE 1 CAPSULE DAILY BEFORE A MEAL, Disp: 90 capsule, Rfl: 3    sertraline (Zoloft) 100 MG tablet, Take 1 tablet by mouth Daily., Disp: 90 tablet, Rfl: 1    traMADol (ULTRAM) 50 MG tablet, Take 1 tablet by mouth Daily., Disp: 30 tablet, Rfl: 2    Allergies: Penicillins    Physical Exam  Constitutional:       Appearance: Normal appearance.   Neurological:      Mental Status: She is alert and oriented to person, place, and time.   Psychiatric:         Mood and Affect: Mood normal.         Behavior: Behavior normal.          Result Review :                   Assessment and Plan    Diagnoses and all orders for this visit:    1. Laryngitis (Primary)  Assessment & Plan:  I am going to give her a medrol dose pack and recommended she try OTC antihistamine. She was cautioned that if she developed difficulty breathing, shortness of breath, or trouble swallowing she would need to present to the nearest Emergency Room.  Call or return if symptoms persist or worsen.           Follow Up   No follow-ups on file.  Patient was given instructions and counseling regarding her condition or for health maintenance advice. Please see specific information pulled into the AVS if appropriate.     Eunice Conteh PA-C

## 2023-10-25 NOTE — ASSESSMENT & PLAN NOTE
I am going to give her a medrol dose pack and recommended she try OTC antihistamine. She was cautioned that if she developed difficulty breathing, shortness of breath, or trouble swallowing she would need to present to the nearest Emergency Room.  Call or return if symptoms persist or worsen.

## 2023-10-26 ENCOUNTER — TELEPHONE (OUTPATIENT)
Dept: FAMILY MEDICINE CLINIC | Facility: CLINIC | Age: 73
End: 2023-10-26
Payer: MEDICARE

## 2023-10-26 RX ORDER — METHYLPREDNISOLONE 4 MG/1
TABLET ORAL
Qty: 21 TABLET | Refills: 0 | Status: SHIPPED | OUTPATIENT
Start: 2023-10-26

## 2023-10-26 NOTE — TELEPHONE ENCOUNTER
Pt detailed Message: If Pt calls back ok for HUB to relay message--The Rx originally  print in the office instead of going to the Pharmacy. RX has been resent to the Pharmacy

## 2023-10-26 NOTE — TELEPHONE ENCOUNTER
PATIENT HAD A TELEHEALTH VISIT YESTERDAY. HER  CALLED AND SAID SHE WAS SUPPOSED TO HAVE STEROIDS CALLED IN. CARROLL CAN BE REACHED -[phone] OR YOU CAN CONTACT HER  JUAN FRANCISCO [phone]

## 2023-12-18 DIAGNOSIS — F41.9 ANXIETY: ICD-10-CM

## 2023-12-18 RX ORDER — SERTRALINE HYDROCHLORIDE 100 MG/1
100 TABLET, FILM COATED ORAL DAILY
Qty: 90 TABLET | Refills: 1 | Status: SHIPPED | OUTPATIENT
Start: 2023-12-18

## 2023-12-18 NOTE — TELEPHONE ENCOUNTER
Caller: Skye De Oliveira    Relationship: Self    Best call back number: [phone]     Requested Prescriptions:   Requested Prescriptions     Pending Prescriptions Disp Refills    sertraline (Zoloft) 100 MG tablet 90 tablet 1     Sig: Take 1 tablet by mouth Daily.        Pharmacy where request should be sent: [id number] - [facility] [place] - [address] [facility] [address]  [place] - [phone] [facility] [phone] FX     Last office visit with prescribing clinician: 3/8/2023   Last telemedicine visit with prescribing clinician: 10/25/2023   Next office visit with prescribing clinician: Visit date not found     Additional details provided by patient:  MAIL ORDER OUT OF PRESCRIPTION.  PLEASE RESEND TO LOCAL PHARMACY.      Does the patient have less than a 3 day supply:  [] Yes  [x] No    Would you like a call back once the refill request has been completed: [] Yes [x] No    If the office needs to give you a call back, can they leave a voicemail: [] Yes [x] No    Heena Velasquez   12/18/23 10:43 EST

## 2024-03-27 DIAGNOSIS — F51.01 PRIMARY INSOMNIA: ICD-10-CM

## 2024-03-28 RX ORDER — OMEPRAZOLE 40 MG/1
CAPSULE, DELAYED RELEASE ORAL
Qty: 90 CAPSULE | Refills: 3 | Status: SHIPPED | OUTPATIENT
Start: 2024-03-28

## 2024-03-28 RX ORDER — DARIDOREXANT 50 MG/1
50 TABLET, FILM COATED ORAL NIGHTLY
Qty: 90 TABLET | Refills: 1 | Status: SHIPPED | OUTPATIENT
Start: 2024-03-28

## 2024-06-04 DIAGNOSIS — N64.4 BREAST PAIN: Primary | ICD-10-CM

## 2024-06-05 DIAGNOSIS — F41.9 ANXIETY: ICD-10-CM

## 2024-06-05 RX ORDER — SERTRALINE HYDROCHLORIDE 100 MG/1
100 TABLET, FILM COATED ORAL DAILY
Qty: 90 TABLET | Refills: 3 | Status: SHIPPED | OUTPATIENT
Start: 2024-06-05

## 2024-06-10 DIAGNOSIS — R10.11 RIGHT UPPER QUADRANT ABDOMINAL PAIN: ICD-10-CM

## 2024-06-10 DIAGNOSIS — F41.9 ANXIETY: ICD-10-CM

## 2024-06-10 DIAGNOSIS — N18.31 CHRONIC KIDNEY DISEASE, STAGE 3A: ICD-10-CM

## 2024-06-11 RX ORDER — TRAMADOL HYDROCHLORIDE 50 MG/1
50 TABLET ORAL 4 TIMES DAILY PRN
Qty: 120 TABLET | Refills: 0 | Status: SHIPPED | OUTPATIENT
Start: 2024-06-11

## 2024-06-11 NOTE — TELEPHONE ENCOUNTER
Last appt 1/22 with labs and UA per NG. Refill approved for tramadol. Pt previous appt was cancelled by our office. Please schedule pt

## 2024-06-25 RX ORDER — FAMOTIDINE 40 MG/1
TABLET, FILM COATED ORAL
Qty: 90 TABLET | Refills: 3 | Status: SHIPPED | OUTPATIENT
Start: 2024-06-25

## 2024-06-26 ENCOUNTER — OFFICE VISIT (OUTPATIENT)
Dept: FAMILY MEDICINE CLINIC | Facility: CLINIC | Age: 74
End: 2024-06-26
Payer: MEDICARE

## 2024-06-26 VITALS
HEART RATE: 74 BPM | HEIGHT: 60 IN | SYSTOLIC BLOOD PRESSURE: 158 MMHG | WEIGHT: 114.5 LBS | BODY MASS INDEX: 22.48 KG/M2 | OXYGEN SATURATION: 96 % | DIASTOLIC BLOOD PRESSURE: 94 MMHG

## 2024-06-26 DIAGNOSIS — S89.92XA INJURY OF LEFT LOWER EXTREMITY, INITIAL ENCOUNTER: ICD-10-CM

## 2024-06-26 DIAGNOSIS — T85.44XA CAPSULAR CONTRACTURE OF BREAST IMPLANT, INITIAL ENCOUNTER: Primary | ICD-10-CM

## 2024-06-26 DIAGNOSIS — Z12.31 ENCOUNTER FOR SCREENING MAMMOGRAM FOR MALIGNANT NEOPLASM OF BREAST: ICD-10-CM

## 2024-06-26 PROCEDURE — 1125F AMNT PAIN NOTED PAIN PRSNT: CPT | Performed by: FAMILY MEDICINE

## 2024-06-26 PROCEDURE — 99214 OFFICE O/P EST MOD 30 MIN: CPT | Performed by: FAMILY MEDICINE

## 2024-06-26 NOTE — PROGRESS NOTES
Date: 2024   Patient Name: Skye De Oliveira  : 1950   MRN: [id number]     Chief Complaint:    Chief Complaint   Patient presents with    Breast Pain     Bilateral breast pain      Leg Pain     Left leg pain       History of Present Illness: Skye De Oliveira is a 73 y.o. female who is here today for HPI         Review of Systems:   Review of Systems    Past Medical History:   Past Medical History:   Diagnosis Date    Abnormal EKG 2022    Adenomatous polyp of colon     Age-related osteoporosis without current pathological fracture     Anxiety     Chronic kidney disease     STAGE 2    Costochondral chest pain     Depression     DJD (degenerative joint disease)     MULTIPLE JOINTS    GERD without esophagitis     H/O mammogram 2016    NLM    High risk medication use     History of COVID-19 2021    no hospital no steroids    Hypokalemia     Major depression in remission     PONV (postoperative nausea and vomiting)     Primary insomnia     Primary osteoarthritis involving multiple joints     S/P bariatric surgery     Thyroid nodule     MULTIPLE    Vitamin D deficiency        Past Surgical History:   Past Surgical History:   Procedure Laterality Date    ABDOMINOPLASTY  2007    APPENDECTOMY      BARIATRIC SURGERY      Gastric Byass    BLADDER REPAIR      [facility]    BREAST SURGERY      REMOVED IMPLANTS AND REMOVED MORE FIROIDS    CATARACT EXTRACTION Bilateral     CHOLECYSTECTOMY  2014    s/p lap elicia/ VLAD for partial SBO by Dr. Booker 14.    COLONOSCOPY  2017    ENDOSCOPY N/A 10/19/2022    Procedure: ESOPHAGOGASTRODUODENOSCOPY;  Surgeon: Ton Jurado MD;  Location: [facility];  Service: General;  Laterality: N/A;    GASTRIC BYPASS      with a 120 cm Minerva limb/ HHR by Dr. Shan Booker at [facility] 14    HAMMER TOE REPAIR      HIATAL HERNIA REPAIR N/A 10/19/2022    Procedure: HIATAL HERNIA REPAIR LAPAROSCOPIC;   Surgeon: Ton Jurado MD;  Location:  [facility];  Service: General;  Laterality: N/A;    HIP EXAMINATION UNDER ANESTHESIA Left     gluteus medius repair    HYSTERECTOMY  1974    AND UILATERAL OOPHHORETOMY    KNEE SURGERY Left 2010    arthroscopy debridement    MASTOPEXY Bilateral 1982    WITH IMPLANT RECONSTRUCTION    NISSEN FUNDOPLICATION N/A 10/19/2022    Procedure: NISSEN FUNDOPLICATION LAPAROSCOPIC;  Surgeon: Ton Jurado MD;  Location:  [facility];  Service: General;  Laterality: N/A;    OVARY SURGERY Right 1998    REMOVED    REFRACTIVE SURGERY Bilateral     SHOULDER SURGERY Bilateral     ROTATOR CUFF  RIGHT 2000 LEFT 2001    TONSILLECTOMY  1974    ADENOIODECTOMY    VOCAL CORD BIOPSY  1992    POLYPS REMOVED       Family History:   Family History   Problem Relation Age of Onset    Lung cancer Mother     Arthritis Mother     Cancer Mother         Lung cancer    Lung cancer Father 49    Cancer Father         Lung cancer    Diabetes Brother     Hearing loss Brother     Hypertension Brother     Breast cancer Neg Hx     Ovarian cancer Neg Hx     Endometrial cancer Neg Hx        Social History:   Social History     Socioeconomic History    Marital status:    Tobacco Use    Smoking status: Never    Smokeless tobacco: Never   Vaping Use    Vaping status: Never Used   Substance and Sexual Activity    Alcohol use: Not Currently    Drug use: Never    Sexual activity: Not Currently     Partners: Male     Comment: no hormones       Medications:     Current Outpatient Medications:     Cholecalciferol (Natural Vitamin D-3) 125 MCG (5000 UT) tablet, Take 1 tablet by mouth Every Evening., Disp: 180 tablet, Rfl: 0    famotidine (PEPCID) 40 MG tablet, TAKE 1 TABLET DAILY, Disp: 90 tablet, Rfl: 3    melatonin 5 MG tablet tablet, Take 1 tablet by mouth At Night As Needed., Disp: , Rfl:     multivitamin (THERAGRAN) tablet tablet, Take 1 tablet by mouth Daily., Disp: , Rfl:     omeprazole (priLOSEC) 40 MG capsule,  "TAKE 1 CAPSULE DAILY BEFORE A MEAL, Disp: 90 capsule, Rfl: 3    Quviviq 50 MG tablet, TAKE 1 TABLET EVERY NIGHT, Disp: 90 tablet, Rfl: 1    sertraline (ZOLOFT) 100 MG tablet, TAKE 1 TABLET DAILY, Disp: 90 tablet, Rfl: 3    traMADol (ULTRAM) 50 MG tablet, Take 1 tablet by mouth 4 (Four) Times a Day As Needed for Moderate Pain., Disp: 120 tablet, Rfl: 0    Allergies:   Allergies   Allergen Reactions    Penicillins Hives     Tolerates cephalospins well           Physical Exam:  Vital Signs:   Vitals:    06/26/24 1304   BP: 158/94   BP Location: Right arm   Patient Position: Sitting   Cuff Size: Adult   Pulse: 74   SpO2: 96%   Weight: 51.9 kg (114 lb 8 oz)   Height: 152.4 cm (60\")     Body mass index is 22.36 kg/m².     Physical Exam      Assessment/Plan:   Diagnoses and all orders for this visit:    1. Capsular contracture of breast implant, initial encounter (Primary)    2. Injury of left lower extremity, initial encounter  -     XR Tibia Fibula 2 View Left; Future  -     XR Ankle 3+ View Left; Future    3. Encounter for screening mammogram for malignant neoplasm of breast  -     Mammo Screening Digital Tomosynthesis Bilateral With CAD; Future           Follow Up:   No follow-ups on file.    Beverley Flores DO  [facility]    "

## 2024-06-27 ENCOUNTER — TELEPHONE (OUTPATIENT)
Dept: FAMILY MEDICINE CLINIC | Facility: CLINIC | Age: 74
End: 2024-06-27
Payer: MEDICARE

## 2024-06-27 NOTE — TELEPHONE ENCOUNTER
Caller: Skye De Oliveira    Relationship: Self    Best call back number: [phone]     Caller requesting test results: XRAY     What test was performed: XRAY     When was the test performed: JUNE 26    Where was the test performed:  [facility]     Additional notes: PLEASE CALL WITH RESULTS

## 2024-07-02 ENCOUNTER — TELEPHONE (OUTPATIENT)
Dept: FAMILY MEDICINE CLINIC | Facility: CLINIC | Age: 74
End: 2024-07-02
Payer: MEDICARE

## 2024-07-02 NOTE — TELEPHONE ENCOUNTER
Caller: Skye De Oliveira    Relationship: Self    Best call back number: [phone]    What is the medical concern/diagnosis: PT CALLED TO CHECK STATUS FOR MRI, STATED THAT SHE HAS NOT HEARD BACK FROM ANYONE, A;LSO MENTIONED SHE WAS SUPPOSE TO HEAR BACK FROM SOMEONE REGARDING REFERRAL FOR MAMMOGRAM

## 2024-07-03 ENCOUNTER — TELEPHONE (OUTPATIENT)
Dept: FAMILY MEDICINE CLINIC | Facility: CLINIC | Age: 74
End: 2024-07-03
Payer: MEDICARE

## 2024-07-03 PROBLEM — Z51.81 ENCOUNTER FOR MEDICATION MONITORING: Status: ACTIVE | Noted: 2024-07-03

## 2024-07-03 NOTE — PROGRESS NOTES
Office Visit       Date: 07/05/2024   Patient Name: Skye De Oliveira  MRN: [id number]  YOB: 1950    Referring Physician: Provider, No Known     Chief Complaint:   Chief Complaint   Patient presents with    Osteoarthritis    Osteoporosis       History of Present Illness: Skye De Oliveira is a 73 y.o. female who is here today for follow-up of osteoarthritis.    We have prescribed her Tramadol. She needs this refilled. No recent serious injuries or infections. No fever.     She rates her pain today as 5/10 in severity. No significant morning stiffness.  Most of her pain is in her left anterior ankle.  No red or hot joints. No swelling. No muscle pain or weakness. No back or neck problems.       Subjective   Review of systems:  Review of Systems   All other systems reviewed and are negative.      Past Medical History:   Past Medical History:   Diagnosis Date    Abnormal EKG 09/20/2022    Adenomatous polyp of colon     Age-related osteoporosis without current pathological fracture     Anxiety     Chronic kidney disease     STAGE 2    Costochondral chest pain     Depression     DJD (degenerative joint disease)     MULTIPLE JOINTS    GERD without esophagitis     H/O mammogram 05/24/2016    [facility]    High risk medication use     History of COVID-19 01/2021    no hospital no steroids    Hypokalemia     Major depression in remission     PONV (postoperative nausea and vomiting)     Primary insomnia     Primary osteoarthritis involving multiple joints     S/P bariatric surgery     Thyroid nodule     MULTIPLE    Vitamin D deficiency        Past Surgical History:   Past Surgical History:   Procedure Laterality Date    ABDOMINOPLASTY  2007    APPENDECTOMY      BARIATRIC SURGERY  2014    Gastric Byass    BLADDER REPAIR  2011    [facility]    BREAST SURGERY  2008    REMOVED IMPLANTS AND REMOVED MORE FIROIDS    CATARACT EXTRACTION Bilateral     CHOLECYSTECTOMY  2014    s/p lap elicia/ VLAD for partial SBO by  Dr. Booker 2/17/14.    COLONOSCOPY  2017    ENDOSCOPY N/A 10/19/2022    Procedure: ESOPHAGOGASTRODUODENOSCOPY;  Surgeon: Ton Jurado MD;  Location:  [facility];  Service: General;  Laterality: N/A;    GASTRIC BYPASS  2014    with a 120 cm Minerva limb/ HHR by Dr. Shan Booker at [facility] 1/22/14    HAMMER TOE REPAIR      HIATAL HERNIA REPAIR N/A 10/19/2022    Procedure: HIATAL HERNIA REPAIR LAPAROSCOPIC;  Surgeon: Ton Jurado MD;  Location:  [facility];  Service: General;  Laterality: N/A;    HIP EXAMINATION UNDER ANESTHESIA Left     gluteus medius repair    HYSTERECTOMY  1974    AND UILATERAL OOPHHORETOMY    KNEE SURGERY Left 2010    arthroscopy debridement    MASTOPEXY Bilateral 1982    WITH IMPLANT RECONSTRUCTION    NISSEN FUNDOPLICATION N/A 10/19/2022    Procedure: NISSEN FUNDOPLICATION LAPAROSCOPIC;  Surgeon: Ton Jurado MD;  Location:  [facility];  Service: General;  Laterality: N/A;    OVARY SURGERY Right 1998    REMOVED    REFRACTIVE SURGERY Bilateral     SHOULDER SURGERY Bilateral     ROTATOR CUFF  RIGHT 2000 LEFT 2001    TONSILLECTOMY  1974    ADENOIODECTOMY    VOCAL CORD BIOPSY  1992    POLYPS REMOVED       Family History:   Family History   Problem Relation Age of Onset    Lung cancer Mother     Arthritis Mother     Cancer Mother         Lung cancer    Lung cancer Father 49    Cancer Father         Lung cancer    Diabetes Brother     Hearing loss Brother     Hypertension Brother     Breast cancer Neg Hx     Ovarian cancer Neg Hx     Endometrial cancer Neg Hx        Social History:   Social History     Socioeconomic History    Marital status:    Tobacco Use    Smoking status: Never    Smokeless tobacco: Never   Vaping Use    Vaping status: Never Used   Substance and Sexual Activity    Alcohol use: Not Currently    Drug use: Never    Sexual activity: Not Currently     Partners: Male     Comment: no hormones       Medications:   Current  "Outpatient Medications:     Cholecalciferol (Natural Vitamin D-3) 125 MCG (5000 UT) tablet, Take 1 tablet by mouth Every Evening., Disp: 180 tablet, Rfl: 0    famotidine (PEPCID) 40 MG tablet, TAKE 1 TABLET DAILY, Disp: 90 tablet, Rfl: 3    melatonin 5 MG tablet tablet, Take 1 tablet by mouth At Night As Needed., Disp: , Rfl:     multivitamin (THERAGRAN) tablet tablet, Take 1 tablet by mouth Daily., Disp: , Rfl:     omeprazole (priLOSEC) 40 MG capsule, TAKE 1 CAPSULE DAILY BEFORE A MEAL, Disp: 90 capsule, Rfl: 3    Quviviq 50 MG tablet, TAKE 1 TABLET EVERY NIGHT, Disp: 90 tablet, Rfl: 1    sertraline (ZOLOFT) 100 MG tablet, TAKE 1 TABLET DAILY, Disp: 90 tablet, Rfl: 3    traMADol (ULTRAM) 50 MG tablet, Take 1 tablet by mouth 4 (Four) Times a Day As Needed for Moderate Pain., Disp: 120 tablet, Rfl: 0    Allergies:   Allergies   Allergen Reactions    Penicillins Hives     Tolerates cephalospins well         I reviewed the patient's chief complaint, history of present illness, review of systems, past medical history, surgical history, family history, social history, medications and allergy list.     Objective    Vital Signs:   Vitals:    07/05/24 1304   BP: 128/72   BP Location: Left arm   Patient Position: Sitting   Cuff Size: Adult   Pulse: 69   Temp: 97.6 °F (36.4 °C)   Weight: 52.8 kg (116 lb 6.4 oz)   Height: 152.4 cm (60\")   PainSc:   5   PainLoc: Finger  Comment: bilateral     Body mass index is 22.73 kg/m².   BMI is within normal parameters. No other follow-up for BMI required.       Physical Exam:  Physical Exam  Constitutional:       Appearance: Normal appearance.   HENT:      Head: Normocephalic and atraumatic.   Eyes:      Conjunctiva/sclera: Conjunctivae normal.      Pupils: Pupils are equal, round, and reactive to light.   Cardiovascular:      Rate and Rhythm: Normal rate and regular rhythm.      Heart sounds: Normal heart sounds.   Pulmonary:      Effort: Pulmonary effort is normal.      Breath sounds: " Normal breath sounds.   Musculoskeletal:         General: Normal range of motion.      Cervical back: Normal range of motion.      Comments: Osteoarthritic changes PIP and DIP joints  Left ankle tender to palpation anteriorly.  No erythema or effusion.  She is favoring her right leg when walking.   Skin:     General: Skin is warm and dry.   Neurological:      General: No focal deficit present.      Mental Status: She is alert and oriented to person, place, and time.   Psychiatric:         Mood and Affect: Mood normal.         Behavior: Behavior normal.         Thought Content: Thought content normal.         Judgment: Judgment normal.          Results Review:   Imaging Results (Last 24 Hours)       ** No results found for the last 24 hours. **            Assessment / Plan    Assessment/Plan:   Diagnoses and all orders for this visit:    1. Osteoarthritis, unspecified osteoarthritis type, unspecified site (Primary)  Assessment & Plan:  * Medications/treatments/interventions tried include: Tylenol, Celebrex, meloxicam, Naproxen, shoulder surgery, she has seen an orthopaedic surgeon, she has done some physical therapy, Tramadol, Advil, she has seen a a hand surgeon (Dr. Chanel), she has had injections in her finger and thumbs, copper fit gloves.    1. Tylenol PRN is ok as directed  2. She has tried taking various oral NSAIDS   3. She has had gastric bypass surgery. Typically we try and limit/avoid oral NSAIDS in patients who have had gastric bypass   4. She has done some physical therapy   5. She has seen orthopaedic surgeons   6. She had shoulder surgery   7. Continue/refill Tramadol   8. Currently she is having left ankle pain. See below.  9. Follow up in 4-6 months   10.  She has seen a hand surgeon   11. She has had injections in her fingers and thumbs at [facility].  12. She has tried wearing copper fit gloves      2. Encounter for medication monitoring  Assessment & Plan:  * Tramadol 50 mg every 6 hours  PRN For pain    UDS appropriate 1/2024  PDMP reviewed  Controlled substance agreement discussed and signed      3. Left ankle pain, unspecified chronicity  Assessment & Plan:  She has pain in her anterior left ankle ongoing for the last few weeks. No specific injury she can recall.  The pain is making it difficult for her to walk and engage in activities she enjoys.  She is taking occasional ibuprofen/Tylenol.  She saw her PCP who ordered an x-ray. Findings showed: left tibia and fibula are intact. There is spurring at the medial and lateral tibial plateau. Bones appear osteopenic. No lytic or sclerotic osseous lesion. No periostitis. The patella is intact.  Left ankle XR showed: Medial malleolus and lateral malleolus intact. Ankle mortise maintained. Talar dome intact. Bones are osteopenic. Calcaneus intact. No visualized fracture.  She is considering MRI.  She has not done PT.  Recommend rest, ice, compression, elevation.  Continue to follow-up with PCP.  She may need orthopedic consult.          Follow Up:   Return in about 6 months (around 1/5/2025) for Dr. Quan.        HELEN Melendez  [facility]

## 2024-07-03 NOTE — TELEPHONE ENCOUNTER
Left Message: If Pt calls back ok for Hub to relay--Normal xray aside from evidence of osteopenia, we can proceed with MRI if this is still causing her pain   Pt has viewed message on My chart

## 2024-07-03 NOTE — TELEPHONE ENCOUNTER
Name: Skye De Oliveira      Relationship: Self      Best Callback Number: [phone]       HUB PROVIDED THE RELAY MESSAGE FROM THE OFFICE      PATIENT: VOICED UNDERSTANDING AND HAS NO FURTHER QUESTIONS AT THIS TIME    ADDITIONAL INFORMATION: PATIENT WANTS TO CONTINUE WITH THE MRI. SHE STATED SHE IS IN A LOT OF PAIN AND STILL CAN'T WALK

## 2024-07-03 NOTE — ASSESSMENT & PLAN NOTE
* Medications/treatments/interventions tried include: Tylenol, Celebrex, meloxicam, Naproxen, shoulder surgery, she has seen an orthopaedic surgeon, she has done some physical therapy, Tramadol, Advil, she has seen a a hand surgeon (Dr. Chanel), she has had injections in her finger and thumbs, copper fit gloves.    1. Tylenol PRN is ok as directed  2. She has tried taking various oral NSAIDS   3. She has had gastric bypass surgery. Typically we try and limit/avoid oral NSAIDS in patients who have had gastric bypass   4. She has done some physical therapy   5. She has seen orthopaedic surgeons   6. She had shoulder surgery   7. Continue/refill Tramadol   8. Currently she is having left ankle pain. See below.  9. Follow up in 4-6 months   10.  She has seen a hand surgeon   11. She has had injections in her fingers and thumbs at [facility].  12. She has tried wearing copper fit gloves

## 2024-07-03 NOTE — ASSESSMENT & PLAN NOTE
* Tramadol 50 mg every 6 hours PRN For pain    UDS appropriate 1/2024  PDMP reviewed  Controlled substance agreement discussed and signed

## 2024-07-05 ENCOUNTER — OFFICE VISIT (OUTPATIENT)
Age: 74
End: 2024-07-05
Payer: MEDICARE

## 2024-07-05 VITALS
WEIGHT: 116.4 LBS | BODY MASS INDEX: 22.85 KG/M2 | HEIGHT: 60 IN | SYSTOLIC BLOOD PRESSURE: 128 MMHG | HEART RATE: 69 BPM | TEMPERATURE: 97.6 F | DIASTOLIC BLOOD PRESSURE: 72 MMHG

## 2024-07-05 DIAGNOSIS — Z51.81 ENCOUNTER FOR MEDICATION MONITORING: ICD-10-CM

## 2024-07-05 DIAGNOSIS — M25.572 LEFT ANKLE PAIN, UNSPECIFIED CHRONICITY: ICD-10-CM

## 2024-07-05 DIAGNOSIS — M19.90 OSTEOARTHRITIS, UNSPECIFIED OSTEOARTHRITIS TYPE, UNSPECIFIED SITE: Primary | ICD-10-CM

## 2024-07-05 PROBLEM — F32.5 MAJOR DEPRESSION IN REMISSION: Status: RESOLVED | Noted: 2018-03-05 | Resolved: 2024-07-05

## 2024-07-05 PROBLEM — N18.31 CHRONIC KIDNEY DISEASE, STAGE 3A: Status: RESOLVED | Noted: 2022-06-16 | Resolved: 2024-07-05

## 2024-07-05 PROCEDURE — 1159F MED LIST DOCD IN RCRD: CPT | Performed by: NURSE PRACTITIONER

## 2024-07-05 PROCEDURE — G2211 COMPLEX E/M VISIT ADD ON: HCPCS | Performed by: NURSE PRACTITIONER

## 2024-07-05 PROCEDURE — 99214 OFFICE O/P EST MOD 30 MIN: CPT | Performed by: NURSE PRACTITIONER

## 2024-07-05 PROCEDURE — 1160F RVW MEDS BY RX/DR IN RCRD: CPT | Performed by: NURSE PRACTITIONER

## 2024-07-05 RX ORDER — TRAMADOL HYDROCHLORIDE 50 MG/1
50 TABLET ORAL 4 TIMES DAILY PRN
Qty: 120 TABLET | Refills: 5 | Status: SHIPPED | OUTPATIENT
Start: 2024-07-05

## 2024-07-05 NOTE — PATIENT INSTRUCTIONS
Fall Prevention in the Home, Adult  Falls can cause injuries and affect people of all ages. There are many simple things that you can do to make your home safe and to help prevent falls.  If you need it, ask for help making these changes.  What actions can I take to prevent falls?  General information  Use good lighting in all rooms. Make sure to:  Replace any light bulbs that burn out.  Turn on lights if it is dark and use night-lights.  Keep items that you use often in easy-to-reach places. Lower the shelves around your home if needed.  Move furniture so that there are clear paths around it.  Do not keep throw rugs or other things on the floor that can make you trip.  If any of your floors are uneven, fix them.  Add color or contrast paint or tape to clearly costa and help you see:  Grab bars or handrails.  First and last steps of staircases.  Where the edge of each step is.  If you use a ladder or stepladder:  Make sure that it is fully opened. Do not climb a closed ladder.  Make sure the sides of the ladder are locked in place.  Have someone hold the ladder while you use it.  Know where your pets are as you move through your home.  What can I do in the bathroom?         Keep the floor dry. Clean up any water that is on the floor right away.  Remove soap buildup in the bathtub or shower. Buildup makes bathtubs and showers slippery.  Use non-skid mats or decals on the floor of the bathtub or shower.  Attach bath mats securely with double-sided, non-slip rug tape.  If you need to sit down while you are in the shower, use a non-slip stool.  Install grab bars by the toilet and in the bathtub and shower. Do not use towel bars as grab bars.  What can I do in the bedroom?  Make sure that you have a light by your bed that is easy to reach.  Do not use any sheets or blankets on your bed that hang to the floor.  Have a firm bench or chair with side arms that you can use for support when you get dressed.  What can I do in  the kitchen?  Clean up any spills right away.  If you need to reach something above you, use a sturdy step stool that has a grab bar.  Keep electrical cables out of the way.  Do not use floor polish or wax that makes floors slippery.  What can I do with my stairs?  Do not leave anything on the stairs.  Make sure that you have a light switch at the top and the bottom of the stairs. Have them installed if you do not have them.  Make sure that there are handrails on both sides of the stairs. Fix handrails that are broken or loose. Make sure that handrails are as long as the staircases.  Install non-slip stair treads on all stairs in your home if they do not have carpet.  Avoid having throw rugs at the top or bottom of stairs, or secure the rugs with carpet tape to prevent them from moving.  Choose a carpet design that does not hide the edge of steps on the stairs. Make sure that carpet is firmly attached to the stairs. Fix any carpet that is loose or worn.  What can I do on the outside of my home?  Use bright outdoor lighting.  Repair the edges of walkways and driveways and fix any cracks. Clear paths of anything that can make you trip, such as tools or rocks.  Add color or contrast paint or tape to clearly costa and help you see high doorway thresholds.  Trim any bushes or trees on the main path into your home.  Check that handrails are securely fastened and in good repair. Both sides of all steps should have handrails.  Install guardrails along the edges of any raised decks or porches.  Have leaves, snow, and ice cleared regularly. Use sand, salt, or ice melt on walkways during winter months if you live where there is ice and snow.  In the garage, clean up any spills right away, including grease or oil spills.  What other actions can I take?  Review your medicines with your health care provider. Some medicines can make you confused or feel dizzy. This can increase your chance of falling.  Wear closed-toe shoes that  fit well and support your feet. Wear shoes that have rubber soles and low heels.  Use a cane, walker, scooter, or crutches that help you move around if needed.  Talk with your provider about other ways that you can decrease your risk of falls. This may include seeing a physical therapist to learn to do exercises to improve movement and strength.  Where to find more information  Centers for Disease Control and Prevention, [place]: cdc.gov  National [place] on Aging: logan.nih.gov  National [place] on Aging: logan.nih.gov  Contact a health care provider if:  You are afraid of falling at home.  You feel weak, drowsy, or dizzy at home.  You fall at home.  Get help right away if you:  Lose consciousness or have trouble moving after a fall.  Have a fall that causes a head injury.  These symptoms may be an emergency. Get help right away. Call 911.  Do not wait to see if the symptoms will go away.  Do not drive yourself to the hospital.  This information is not intended to replace advice given to you by your health care provider. Make sure you discuss any questions you have with your health care provider.  Document Revised: 08/21/2023 Document Reviewed: 08/21/2023  Elsevier Patient Education © 2024 [employer]

## 2024-07-05 NOTE — ASSESSMENT & PLAN NOTE
She has pain in her anterior left ankle ongoing for the last few weeks. No specific injury she can recall.  The pain is making it difficult for her to walk and engage in activities she enjoys.  She is taking occasional ibuprofen/Tylenol.  She saw her PCP who ordered an x-ray. Findings showed: left tibia and fibula are intact. There is spurring at the medial and lateral tibial plateau. Bones appear osteopenic. No lytic or sclerotic osseous lesion. No periostitis. The patella is intact.  Left ankle XR showed: Medial malleolus and lateral malleolus intact. Ankle mortise maintained. Talar dome intact. Bones are osteopenic. Calcaneus intact. No visualized fracture.  She is considering MRI.  She has not done PT.  Recommend rest, ice, compression, elevation.  Continue to follow-up with PCP.  She may need orthopedic consult.

## 2024-07-08 ENCOUNTER — TELEPHONE (OUTPATIENT)
Dept: FAMILY MEDICINE CLINIC | Facility: CLINIC | Age: 74
End: 2024-07-08
Payer: MEDICARE

## 2024-07-08 NOTE — TELEPHONE ENCOUNTER
Caller: Skye De Oliveira    Relationship: Self    Best call back number: [phone]    What is the medical concern/diagnosis:     LEG PAIN    What specialty or service is being requested:     MRI    Any additional details:     PATIENT STATES SHE GOT RESULTS ON 7/5 FROM X-RAY AND MRI WAS SUPPOSED TO BE SCHEDULED.     HER LEG IS THROBBING     PLEASE CALL WITH MRI APPOINTMENT DETAILS AS SOON AS POSSIBLE

## 2024-07-09 DIAGNOSIS — S89.92XA INJURY OF LEFT LOWER EXTREMITY, INITIAL ENCOUNTER: Primary | ICD-10-CM

## 2024-07-10 LAB
NCCN CRITERIA FLAG: ABNORMAL
TYRER CUZICK SCORE: 1.5

## 2024-07-12 DIAGNOSIS — Z13.79 GENETIC TESTING: Primary | ICD-10-CM

## 2024-07-12 NOTE — PROGRESS NOTES
This patient recently took the CARE risk assessment as part of their mammogram appointment. Based on the patient's responses, NCCN criteria for genetic testing was met.     Navigator follow-up:   Spoke with the patient today and she would like to proceed with genetic testing.  I will place a co-sign order for her provider and coordinate care.

## 2024-07-15 ENCOUNTER — TELEPHONE (OUTPATIENT)
Dept: FAMILY MEDICINE CLINIC | Facility: CLINIC | Age: 74
End: 2024-07-15
Payer: MEDICARE

## 2024-07-15 NOTE — TELEPHONE ENCOUNTER
Caller: Skye De Oliveira    Relationship: Self    Best call back number: [phone]    What is the best time to reach you: ANYTIME    Who are you requesting to speak with (clinical staff, provider,  specific staff member): DR FIORE / CLINICAL STAFF    Do you know the name of the person who called: SKYE    What was the call regarding: PATIENT CALLED TO CHECK ON THE STATUS OF HER MOST RECENT MRI RESULTS    PLEASE ADVISE

## 2024-07-16 ENCOUNTER — OFFICE VISIT (OUTPATIENT)
Dept: ORTHOPEDIC SURGERY | Facility: CLINIC | Age: 74
End: 2024-07-16
Payer: MEDICARE

## 2024-07-16 VITALS
BODY MASS INDEX: 22.78 KG/M2 | SYSTOLIC BLOOD PRESSURE: 118 MMHG | WEIGHT: 116 LBS | DIASTOLIC BLOOD PRESSURE: 64 MMHG | HEIGHT: 60 IN

## 2024-07-16 DIAGNOSIS — M84.362A STRESS FRACTURE OF LEFT TIBIA, INITIAL ENCOUNTER: Primary | ICD-10-CM

## 2024-07-16 PROCEDURE — 1159F MED LIST DOCD IN RCRD: CPT | Performed by: STUDENT IN AN ORGANIZED HEALTH CARE EDUCATION/TRAINING PROGRAM

## 2024-07-16 PROCEDURE — 1160F RVW MEDS BY RX/DR IN RCRD: CPT | Performed by: STUDENT IN AN ORGANIZED HEALTH CARE EDUCATION/TRAINING PROGRAM

## 2024-07-16 PROCEDURE — 99204 OFFICE O/P NEW MOD 45 MIN: CPT | Performed by: STUDENT IN AN ORGANIZED HEALTH CARE EDUCATION/TRAINING PROGRAM

## 2024-07-16 RX ORDER — ERGOCALCIFEROL 1.25 MG/1
1 CAPSULE ORAL WEEKLY
Qty: 7 CAPSULE | Refills: 0 | Status: SHIPPED | OUTPATIENT
Start: 2024-07-16

## 2024-07-16 NOTE — PROGRESS NOTES
[facility] [place] [facility] Office Visit     Office Visit       Date: 07/16/2024   Patient Name: Skye De Oliveira  MRN: [id number]  YOB: 1950    Referring Physician: No ref. provider found     Chief Complaint:   Chief Complaint   Patient presents with    Left Tibia - Pain    Left Fibula - Pain       History of Present Illness:   Skye De Oliveira is a 73 y.o. female who presents with new problem of: left tibia pain.  Onset: mechanical fall. The issue has been ongoing for 1 month(s). Pain is a 5/10 on the pain scale. Pain is described as aching and throbbing. Associated symptoms include pain and swelling. The pain is worse with walking, standing, and climbing stairs; sitting, ice, pain medication and/or NSAID, and lying down improve the pain. Previous treatments have included: NSAIDs).    Subjective   Review of Systems: Review of Systems   Constitutional:  Negative for chills, fever, unexpected weight gain and unexpected weight loss.   HENT:  Negative for congestion, postnasal drip and rhinorrhea.    Eyes:  Negative for blurred vision.   Respiratory:  Negative for shortness of breath.    Cardiovascular:  Negative for leg swelling.   Gastrointestinal:  Negative for abdominal pain, nausea and vomiting.   Genitourinary:  Negative for difficulty urinating.   Musculoskeletal:  Positive for arthralgias. Negative for gait problem, joint swelling and myalgias.   Skin:  Negative for skin lesions and wound.   Neurological:  Negative for dizziness, weakness, light-headedness and numbness.   Hematological:  Does not bruise/bleed easily.   Psychiatric/Behavioral:  Negative for depressed mood.         I have reviewed the following portions of the patient's history:History of Present Illness and review of systems.    Past Medical History:   Past Medical History:   Diagnosis Date    Abnormal EKG 09/20/2022    Adenomatous polyp of colon     Age-related osteoporosis without current  pathological fracture     Anxiety     Chronic kidney disease     STAGE 2    Costochondral chest pain     Depression     DJD (degenerative joint disease)     MULTIPLE JOINTS    GERD without esophagitis     H/O mammogram 05/24/2016    NLM    High risk medication use     History of COVID-19 01/2021    no hospital no steroids    Hypokalemia     Major depression in remission     PONV (postoperative nausea and vomiting)     Primary insomnia     Primary osteoarthritis involving multiple joints     S/P bariatric surgery     Thyroid nodule     MULTIPLE    Vitamin D deficiency        Past Surgical History:   Past Surgical History:   Procedure Laterality Date    ABDOMINOPLASTY  2007    APPENDECTOMY      BARIATRIC SURGERY  2014    Gastric Byass    BLADDER REPAIR  2011    [facility]    BREAST SURGERY  2008    REMOVED IMPLANTS AND REMOVED MORE FIROIDS    CATARACT EXTRACTION Bilateral     CHOLECYSTECTOMY  2014    s/p lap elicia/ VLAD for partial SBO by Dr. Booker 2/17/14.    COLONOSCOPY  2017    ENDOSCOPY N/A 10/19/2022    Procedure: ESOPHAGOGASTRODUODENOSCOPY;  Surgeon: Ton Jurado MD;  Location:  [facility];  Service: General;  Laterality: N/A;    GASTRIC BYPASS  2014    with a 120 cm Minerva limb/ HHR by Dr. Shan Booker at [facility] 1/22/14    HAMMER TOE REPAIR      HIATAL HERNIA REPAIR N/A 10/19/2022    Procedure: HIATAL HERNIA REPAIR LAPAROSCOPIC;  Surgeon: Ton Jurado MD;  Location:  [facility];  Service: General;  Laterality: N/A;    HIP EXAMINATION UNDER ANESTHESIA Left     gluteus medius repair    HYSTERECTOMY  1974    AND UILATERAL OOPHHORETOMY    KNEE SURGERY Left 2010    arthroscopy debridement    MASTOPEXY Bilateral 1982    WITH IMPLANT RECONSTRUCTION    NISSEN FUNDOPLICATION N/A 10/19/2022    Procedure: NISSEN FUNDOPLICATION LAPAROSCOPIC;  Surgeon: Ton Jurado MD;  Location:  [facility];  Service: General;  Laterality: N/A;    OVARY SURGERY Right 1998    REMOVED     REFRACTIVE SURGERY Bilateral     SHOULDER SURGERY Bilateral     ROTATOR CUFF  RIGHT 2000 LEFT 2001    TONSILLECTOMY  1974    ADENOIODECTOMY    VOCAL CORD BIOPSY  1992    POLYPS REMOVED       Family History:   Family History   Problem Relation Age of Onset    Lung cancer Mother     Arthritis Mother     Cancer Mother         Lung cancer    Lung cancer Father 49    Cancer Father         Lung cancer    Diabetes Brother     Hearing loss Brother     Hypertension Brother     Breast cancer Neg Hx     Ovarian cancer Neg Hx     Endometrial cancer Neg Hx        Social History:   Social History     Socioeconomic History    Marital status:    Tobacco Use    Smoking status: Never    Smokeless tobacco: Never   Vaping Use    Vaping status: Never Used   Substance and Sexual Activity    Alcohol use: Not Currently    Drug use: Never    Sexual activity: Not Currently     Partners: Male     Comment: no hormones       Medications:   Current Outpatient Medications:     Cholecalciferol (Natural Vitamin D-3) 125 MCG (5000 UT) tablet, Take 1 tablet by mouth Every Evening., Disp: 180 tablet, Rfl: 0    famotidine (PEPCID) 40 MG tablet, TAKE 1 TABLET DAILY, Disp: 90 tablet, Rfl: 3    melatonin 5 MG tablet tablet, Take 1 tablet by mouth At Night As Needed., Disp: , Rfl:     multivitamin (THERAGRAN) tablet tablet, Take 1 tablet by mouth Daily., Disp: , Rfl:     omeprazole (priLOSEC) 40 MG capsule, TAKE 1 CAPSULE DAILY BEFORE A MEAL, Disp: 90 capsule, Rfl: 3    Quviviq 50 MG tablet, TAKE 1 TABLET EVERY NIGHT, Disp: 90 tablet, Rfl: 1    sertraline (ZOLOFT) 100 MG tablet, TAKE 1 TABLET DAILY, Disp: 90 tablet, Rfl: 3    traMADol (ULTRAM) 50 MG tablet, Take 1 tablet by mouth 4 (Four) Times a Day As Needed for Moderate Pain., Disp: 120 tablet, Rfl: 5    Vitamin D, Ergocalciferol, 72432 units capsule, Take 1 capsule by mouth 1 (One) Time Per Week., Disp: 7 capsule, Rfl: 0    Allergies:   Allergies   Allergen Reactions    Penicillins Hives      "Tolerates cephalospins well         I reviewed the patient's chief complaint, history of present illness, review of systems, past medical history, surgical history, family history, social history, medications and allergy list.     Objective    Vital Signs:   Vitals:    07/16/24 1317   BP: 118/64   Weight: 52.6 kg (116 lb)   Height: 152.4 cm (60\")     Body mass index is 22.65 kg/m².   BMI is within normal parameters. No other follow-up for BMI required.     Patient reports that she is a non-smoker and has not ever been a smoker.  This behavior was applauded and she was encouraged to continue in smoking cessation.  We will continue to monitor at subsequent visits.    Ortho Exam:  Gait and Station: Appearance: limp, antalgic gait, and ambulates with crutches.   Cardiovascular System: Arterial Pulses Right: dorsalis pedis normal and posterior tibialis normal. Arterial Pulses Left: posterior tibialis normal. Edema Right: none. Edema Left: none. Varicosities Right: capillary refill test normal. Varicosities Left: capillary refill test normal.   Lower Legs: Inspection Right: no mass, induration, warmth, erythema, swelling, or ecchymosis. Bony Palpation Left: no tenderness of the proximal tibia, the distal tibia, the proximal fibula, the mid fibula, or the distal fibula and tenderness of the mid tibia medial (anterior). Soft Tissue Palpation Right: no tenderness of the anterior tibial muscle compartment, the lateral tibial muscle compartment, the posterior tibial muscle compartment, the gastrocnemius, or the achilles tendon. Soft Tissue Palpation Left: no tenderness of the lateral tibial muscle compartment, the posterior tibial muscle compartment, the gastrocnemius, or the achilles tendon and tenderness of the anterior tibial muscle compartment. Active Range of Motion Right: knee flexion normal and extension normal and ankle dorsiflexion normal, plantar flexion normal, inversion normal, and eversion normal. Active Range of " Motion Left: knee flexion normal and extension normal and ankle dorsiflexion normal, plantar flexion normal, inversion normal, and eversion normal. Stability Right: anterior drawer negative. Stability Left: anterior drawer negative. Right Lower Leg Strength extensor hallucis longus 5/5 and digitorum longus 5/5; peroneus longus 5/5 and brevis 5/5; knee flexion 5/5 and extension 5/5; and lateral rotation of flexed leg 5/5, medial rotation of flexed leg 5/5, tibialis anterior 5/5, posterior tibialis 5/5, and gastrocnemius 5/5. Left Lower Leg Strength extensor hallucis longus 5/5 and digitorum longus 5/5; peroneus longus 5/5 and brevis 5/5; knee flexion 5/5 and extension 5/5; and lateral rotation of flexed leg 5/5, medial rotation of flexed leg 5/5, tibialis anterior 5/5, posterior tibialis 5/5, and gastrocnemius 5/5.   Skin: Right Lower Extremity: normal. Left Lower Extremity: normal.   Neurologic: Sensation on the Right: L2 normal, L3 normal, L4 normal, L5 normal, S1 normal, S2 normal, and S3,4,5 normal. Sensation on the Left: L2 normal, L3 normal, L4 normal, L5 normal, S1 normal, S2 normal, and S3,4,5 normal.     Results Review:   Imaging Results (Last 24 Hours)       ** No results found for the last 24 hours. **            Procedures    Assessment / Plan    Assessment/Plan:   Diagnoses and all orders for this visit:    1. Stress fracture of left tibia, initial encounter (Primary)  -     Vitamin D, Ergocalciferol, 13035 units capsule; Take 1 capsule by mouth 1 (One) Time Per Week.  Dispense: 7 capsule; Refill: 0    Patient here today with left anterior tibial stress fracture.  MRI shows diffuse edema with circumferential periosteal edema in the tibia.  She is acutely tender over the tibia on physical exam.  Stress fracture is not evident on her radiographs.  We had extensive discussion today about tibial stress fractures.  I have discussed activity modification with her.  She will not be golfing at this time.  We  will place her into a walking boot, give her crutches, and make her toe-touch weightbearing.  I encouraged her to bear as little weight as possible.  I do not think that she would tolerate fiberglass casting and being completely nonweightbearing with a rolling scooter.  Do not think that she can use crutches well enough to be completely nonweightbearing also.  I prescribed high-dose vitamin D in addition to her regular daily vitamin D dosage.  I will see her back in 4 weeks.    Previous documentation reviewed: 6/26/2024-office visit-Beverley Flores DO.    Previous imaging studies reviewed: 6/26/2024-radiographs of left tibia-fibula.  7/9/2024-MRI tibia-fibula.    Follow Up:   Return in about 4 weeks (around 8/13/2024) for Recheck.      Justin Suarez MD  [facility] [place] [facility]

## 2024-07-16 NOTE — TELEPHONE ENCOUNTER
Called Dr. Suarez's office to see if patient can be seen today for stress fracture. Will call patient after I hear from his office.

## 2024-07-25 ENCOUNTER — LAB (OUTPATIENT)
Dept: LAB | Facility: HOSPITAL | Age: 74
End: 2024-07-25
Payer: MEDICARE

## 2024-07-25 ENCOUNTER — HOSPITAL ENCOUNTER (OUTPATIENT)
Dept: MAMMOGRAPHY | Facility: HOSPITAL | Age: 74
End: 2024-07-25
Payer: MEDICARE

## 2024-07-25 DIAGNOSIS — Z13.79 GENETIC TESTING: ICD-10-CM

## 2024-07-25 PROCEDURE — 36415 COLL VENOUS BLD VENIPUNCTURE: CPT

## 2024-08-06 LAB
AMBRY INTERPRETATION REPORT: NORMAL
GENE DIS ANL INTERP-IMP: NORMAL

## 2024-08-09 ENCOUNTER — HOSPITAL ENCOUNTER (OUTPATIENT)
Dept: MAMMOGRAPHY | Facility: HOSPITAL | Age: 74
Discharge: HOME | End: 2024-08-09
Payer: MEDICARE

## 2024-08-09 DIAGNOSIS — N64.4 BREAST PAIN: ICD-10-CM

## 2024-08-09 PROCEDURE — 77066 DX MAMMO INCL CAD BI: CPT

## 2024-08-09 PROCEDURE — G0279 TOMOSYNTHESIS, MAMMO: HCPCS

## 2024-08-13 ENCOUNTER — OFFICE VISIT (OUTPATIENT)
Dept: ORTHOPEDIC SURGERY | Facility: CLINIC | Age: 74
End: 2024-08-13
Payer: MEDICARE

## 2024-08-13 VITALS
DIASTOLIC BLOOD PRESSURE: 80 MMHG | WEIGHT: 116 LBS | BODY MASS INDEX: 22.78 KG/M2 | SYSTOLIC BLOOD PRESSURE: 138 MMHG | HEIGHT: 60 IN

## 2024-08-13 DIAGNOSIS — M84.362D STRESS FRACTURE OF LEFT TIBIA WITH ROUTINE HEALING, SUBSEQUENT ENCOUNTER: Primary | ICD-10-CM

## 2024-08-13 PROCEDURE — 99213 OFFICE O/P EST LOW 20 MIN: CPT | Performed by: STUDENT IN AN ORGANIZED HEALTH CARE EDUCATION/TRAINING PROGRAM

## 2024-08-13 PROCEDURE — 1159F MED LIST DOCD IN RCRD: CPT | Performed by: STUDENT IN AN ORGANIZED HEALTH CARE EDUCATION/TRAINING PROGRAM

## 2024-08-13 PROCEDURE — 1160F RVW MEDS BY RX/DR IN RCRD: CPT | Performed by: STUDENT IN AN ORGANIZED HEALTH CARE EDUCATION/TRAINING PROGRAM

## 2024-08-13 NOTE — PROGRESS NOTES
[facility] [place] [facility] Office Follow Up Visit     Office Follow Up      Date: 08/13/2024   Patient Name: Skye De Oliveira  MRN: [id number]  YOB: 1950    Referring Physician: No ref. provider found     Chief Complaint:   Chief Complaint   Patient presents with    Follow-up     4 week follow up -- Stress fracture of left tibia       History of Present Illness: Skye De Oliveira is a 73 y.o. female who returns to clinic today for follow up on left tibia pain. Her pain is a 0  /10 on the pain scale. Patient has tried the following previous treatments boot and crutches. She states that these treatments have Improved.    Subjective     Review of Systems: Review of Systems   Constitutional:  Negative for chills, fever, unexpected weight gain and unexpected weight loss.   HENT:  Negative for congestion, postnasal drip and rhinorrhea.    Eyes:  Negative for blurred vision.   Respiratory:  Negative for shortness of breath.    Cardiovascular:  Negative for leg swelling.   Gastrointestinal:  Negative for abdominal pain, nausea and vomiting.   Genitourinary:  Negative for difficulty urinating.   Musculoskeletal:  Positive for arthralgias. Negative for gait problem, joint swelling and myalgias.   Skin:  Negative for skin lesions and wound.   Neurological:  Negative for dizziness, weakness, light-headedness and numbness.   Hematological:  Does not bruise/bleed easily.   Psychiatric/Behavioral:  Negative for depressed mood.         Medications:   Current Outpatient Medications:     Cholecalciferol (Natural Vitamin D-3) 125 MCG (5000 UT) tablet, Take 1 tablet by mouth Every Evening., Disp: 180 tablet, Rfl: 0    famotidine (PEPCID) 40 MG tablet, TAKE 1 TABLET DAILY, Disp: 90 tablet, Rfl: 3    melatonin 5 MG tablet tablet, Take 1 tablet by mouth At Night As Needed., Disp: , Rfl:     multivitamin (THERAGRAN) tablet tablet, Take 1 tablet by mouth Daily., Disp: , Rfl:     omeprazole  "(priLOSEC) 40 MG capsule, TAKE 1 CAPSULE DAILY BEFORE A MEAL, Disp: 90 capsule, Rfl: 3    Quviviq 50 MG tablet, TAKE 1 TABLET EVERY NIGHT, Disp: 90 tablet, Rfl: 1    sertraline (ZOLOFT) 100 MG tablet, TAKE 1 TABLET DAILY, Disp: 90 tablet, Rfl: 3    traMADol (ULTRAM) 50 MG tablet, Take 1 tablet by mouth 4 (Four) Times a Day As Needed for Moderate Pain., Disp: 120 tablet, Rfl: 5    Vitamin D, Ergocalciferol, 88972 units capsule, Take 1 capsule by mouth 1 (One) Time Per Week., Disp: 7 capsule, Rfl: 0    Allergies:   Allergies   Allergen Reactions    Penicillins Hives     Tolerates cephalospins well         I have reviewed and updated the patient's chief complaint, history of present illness, review of systems, past medical history, surgical history, family history, social history, medications and allergy list as appropriate.     Objective      Vital Signs:   Vitals:    08/13/24 1447   BP: 138/80   Weight: 52.6 kg (116 lb)   Height: 152.4 cm (60\")     Body mass index is 22.65 kg/m².  BMI is within normal parameters. No other follow-up for BMI required.    Patient reports that she is a non-smoker and has not ever been a smoker.  This behavior was applauded and she was encouraged to continue in smoking cessation.  We will continue to monitor at subsequent visits.     Ortho Exam:  Gait and Station: Appearance: ambulates with crutches.   Cardiovascular System: Arterial Pulses Right: dorsalis pedis normal and posterior tibialis normal. Arterial Pulses Left: posterior tibialis normal. Edema Right: none. Edema Left: none. Varicosities Right: capillary refill test normal. Varicosities Left: capillary refill test normal.   Lower Legs: Inspection Right: no mass, induration, warmth, erythema, swelling, or ecchymosis. Bony Palpation Left: no tenderness of the proximal tibia, the distal tibia, the proximal fibula, the mid fibula, or the distal fibula and no tenderness of the mid tibia medial (anterior). Soft Tissue Palpation Right: " no tenderness of the anterior tibial muscle compartment, the lateral tibial muscle compartment, the posterior tibial muscle compartment, the gastrocnemius, or the achilles tendon. Soft Tissue Palpation Left: no tenderness of the lateral tibial muscle compartment, the posterior tibial muscle compartment, the gastrocnemius, or the achilles tendon and no tenderness of the anterior tibial muscle compartment. Active Range of Motion Right: knee flexion normal and extension normal and ankle dorsiflexion normal, plantar flexion normal, inversion normal, and eversion normal. Active Range of Motion Left: knee flexion normal and extension normal and ankle dorsiflexion normal, plantar flexion normal, inversion normal, and eversion normal. Stability Right: anterior drawer negative. Stability Left: anterior drawer negative. Right Lower Leg Strength extensor hallucis longus 5/5 and digitorum longus 5/5; peroneus longus 5/5 and brevis 5/5; knee flexion 5/5 and extension 5/5; and lateral rotation of flexed leg 5/5, medial rotation of flexed leg 5/5, tibialis anterior 5/5, posterior tibialis 5/5, and gastrocnemius 5/5. Left Lower Leg Strength extensor hallucis longus 5/5 and digitorum longus 5/5; peroneus longus 5/5 and brevis 5/5; knee flexion 5/5 and extension 5/5; and lateral rotation of flexed leg 5/5, medial rotation of flexed leg 5/5, tibialis anterior 5/5, posterior tibialis 5/5, and gastrocnemius 5/5.   Skin: Right Lower Extremity: normal. Left Lower Extremity: normal.   Neurologic: Sensation on the Right: L2 normal, L3 normal, L4 normal, L5 normal, S1 normal, S2 normal, and S3,4,5 normal. Sensation on the Left: L2 normal, L3 normal, L4 normal, L5 normal, S1 normal, S2 normal, and S3,4,5 normal.    Results Review:   Imaging Results (Last 24 Hours)       ** No results found for the last 24 hours. **            Procedures    Assessment / Plan      Assessment/Plan:   Diagnoses and all orders for this visit:    1. Stress fracture  of left tibia with routine healing, subsequent encounter (Primary)    Follow-up on left anterior tibial stress fracture.  Since last visit, she has been in a tall walking boot but not bearing weight and using crutches.  She has also been taking vitamin D 50,000 units once per week.  Symptoms have improved and she does not have any pain today.  I recommend that she complete the vitamin D course.  We discussed a weaning measure from the boot and crutches.  She will go now into a regular shoe bearing weight with the assistance of the crutches.  At this is tolerated, she can wean from the crutches.  She can return to golf as able/desired.  If symptoms flare, she should go back down a step.  Happy to see her back to answer question for follow-up will be as needed.    Follow Up:   Return if symptoms worsen or fail to improve.      Justin Suarez MD  [facility] [place] [facility]

## 2024-08-28 ENCOUNTER — OFFICE VISIT (OUTPATIENT)
Dept: FAMILY MEDICINE CLINIC | Facility: CLINIC | Age: 74
End: 2024-08-28
Payer: MEDICARE

## 2024-08-28 VITALS
WEIGHT: 119.3 LBS | HEIGHT: 60 IN | BODY MASS INDEX: 23.42 KG/M2 | DIASTOLIC BLOOD PRESSURE: 80 MMHG | SYSTOLIC BLOOD PRESSURE: 142 MMHG | HEART RATE: 69 BPM | OXYGEN SATURATION: 96 %

## 2024-08-28 DIAGNOSIS — S46.212A STRAIN OF LEFT BICEPS MUSCLE, INITIAL ENCOUNTER: Primary | ICD-10-CM

## 2024-08-28 PROCEDURE — 99213 OFFICE O/P EST LOW 20 MIN: CPT | Performed by: FAMILY MEDICINE

## 2024-08-28 PROCEDURE — 1125F AMNT PAIN NOTED PAIN PRSNT: CPT | Performed by: FAMILY MEDICINE

## 2024-08-28 PROCEDURE — 1160F RVW MEDS BY RX/DR IN RCRD: CPT | Performed by: FAMILY MEDICINE

## 2024-08-28 PROCEDURE — 1159F MED LIST DOCD IN RCRD: CPT | Performed by: FAMILY MEDICINE

## 2024-08-28 RX ORDER — PREDNISONE 20 MG/1
20 TABLET ORAL
Qty: 10 TABLET | Refills: 0 | Status: SHIPPED | OUTPATIENT
Start: 2024-08-28

## 2024-08-28 NOTE — PROGRESS NOTES
Follow Up Office Visit      Patient Name: Skye De Oliveira  : 1950   MRN: [id number]     Chief Complaint:    Chief Complaint   Patient presents with    Arm Pain     Concerned with pinched nerve in left arm, has burning and pain. States she just stopped using crutches last week and thinks this may be the cause.       History of Present Illness: Skye De Oliveira is a 73 y.o. female who is here today for follow up with left arm pain near the mid to distal biceps.  Patient thinks she may have injured while using a crutch.    Subjective      Review of Systems:   Review of Systems   Musculoskeletal:  Positive for myalgias.   Skin:  Negative for rash and wound.       The following portions of the patient's history were reviewed and updated as appropriate: allergies, current medications, past family history, past medical history, past social history, past surgical history and problem list.    Medications:     Current Outpatient Medications:     Cholecalciferol (Natural Vitamin D-3) 125 MCG (5000 UT) tablet, Take 1 tablet by mouth Every Evening., Disp: 180 tablet, Rfl: 0    famotidine (PEPCID) 40 MG tablet, TAKE 1 TABLET DAILY, Disp: 90 tablet, Rfl: 3    melatonin 5 MG tablet tablet, Take 1 tablet by mouth At Night As Needed., Disp: , Rfl:     multivitamin (THERAGRAN) tablet tablet, Take 1 tablet by mouth Daily., Disp: , Rfl:     omeprazole (priLOSEC) 40 MG capsule, TAKE 1 CAPSULE DAILY BEFORE A MEAL, Disp: 90 capsule, Rfl: 3    Quviviq 50 MG tablet, TAKE 1 TABLET EVERY NIGHT, Disp: 90 tablet, Rfl: 1    sertraline (ZOLOFT) 100 MG tablet, TAKE 1 TABLET DAILY, Disp: 90 tablet, Rfl: 3    traMADol (ULTRAM) 50 MG tablet, Take 1 tablet by mouth 4 (Four) Times a Day As Needed for Moderate Pain., Disp: 120 tablet, Rfl: 5    Vitamin D, Ergocalciferol, 63090 units capsule, Take 1 capsule by mouth 1 (One) Time Per Week., Disp: 7 capsule, Rfl: 0    Diclofenac Sodium (Voltaren) 1 % gel gel, Apply 4 g topically to the  "appropriate area as directed 4 (Four) Times a Day As Needed (musculoskeletal pain)., Disp: 100 g, Rfl: 1    predniSONE (DELTASONE) 20 MG tablet, Take 1 tablet by mouth 2 (Two) Times a Day., Disp: 10 tablet, Rfl: 0    Allergies:   Allergies   Allergen Reactions    Penicillins Hives     Tolerates cephalospins well         Objective     Physical Exam:  Vital Signs:   Vitals:    08/28/24 1439   BP: 142/80   BP Location: Right arm   Patient Position: Sitting   Cuff Size: Adult   Pulse: 69   SpO2: 96%   Weight: 54.1 kg (119 lb 4.8 oz)   Height: 152.4 cm (60\")     Body mass index is 23.3 kg/m².   Facility age limit for growth %chrissy is 20 years.    Physical Exam  Vitals and nursing note reviewed.   Musculoskeletal:      Left upper arm: Tenderness present. No swelling or bony tenderness.        Arms:       Comments: Tenderness on the medial aspect of the left mid biceps distally to near the insertion at the elbow.         Procedures    PHQ-9 Total Score:       Assessment / Plan      Assessment/Plan:   Assessment & Plan  Strain of left biceps muscle, initial encounter  Follow-up if symptoms persist or worsen.     New Medications Ordered This Visit   Medications    predniSONE (DELTASONE) 20 MG tablet     Sig: Take 1 tablet by mouth 2 (Two) Times a Day.     Dispense:  10 tablet     Refill:  0    Diclofenac Sodium (Voltaren) 1 % gel gel     Sig: Apply 4 g topically to the appropriate area as directed 4 (Four) Times a Day As Needed (musculoskeletal pain).     Dispense:  100 g     Refill:  1            BMI is within normal parameters. No other follow-up for BMI required.      Follow Up:   No follow-ups on file.      MELINDA Gonzalez MD  [facility]  Answers submitted by the patient for this visit:  Other (Submitted on 8/28/2024)  Please describe your symptoms.: Left arm pain  Have you had these symptoms before?: No  How long have you been having these symptoms?: 1-4 days  Please describe any probable cause for these " symptoms. : Possibly when i used crutches  Primary Reason for Visit (Submitted on 8/28/2024)  What is the primary reason for your visit?: Other

## 2024-08-30 ENCOUNTER — TELEPHONE (OUTPATIENT)
Age: 74
End: 2024-08-30

## 2024-08-30 NOTE — TELEPHONE ENCOUNTER
Hub staff attempted to follow warm transfer process and was unsuccessful     Caller: Skye De Oliveira    Relationship to patient: Self    Best call back number: [phone]; [facility]    Patient is needing: PATIENT HAD LEFT 3 MESSAGES FOR [employer] AND DID NOT KNOW SHE WAS NO LONGER WITH THE COMPANY. SHE RECEIVED A BILL AND STATES SHE WAS NOT SEEN ON THE DATE LISTED ON THE BILL.

## 2024-09-02 DIAGNOSIS — M84.362A STRESS FRACTURE OF LEFT TIBIA, INITIAL ENCOUNTER: ICD-10-CM

## 2024-09-03 RX ORDER — ERGOCALCIFEROL 1.25 MG/1
50000 CAPSULE, LIQUID FILLED ORAL WEEKLY
Qty: 7 CAPSULE | Refills: 0 | OUTPATIENT
Start: 2024-09-03

## 2024-09-23 ENCOUNTER — OFFICE VISIT (OUTPATIENT)
Dept: FAMILY MEDICINE CLINIC | Facility: CLINIC | Age: 74
End: 2024-09-23
Payer: MEDICARE

## 2024-09-23 VITALS
WEIGHT: 122 LBS | SYSTOLIC BLOOD PRESSURE: 148 MMHG | OXYGEN SATURATION: 94 % | DIASTOLIC BLOOD PRESSURE: 82 MMHG | HEIGHT: 60 IN | HEART RATE: 67 BPM | BODY MASS INDEX: 23.95 KG/M2

## 2024-09-23 DIAGNOSIS — J00 ACUTE NASOPHARYNGITIS: Primary | ICD-10-CM

## 2024-09-23 PROCEDURE — 99213 OFFICE O/P EST LOW 20 MIN: CPT | Performed by: PHYSICIAN ASSISTANT

## 2024-09-23 PROCEDURE — 1160F RVW MEDS BY RX/DR IN RCRD: CPT | Performed by: PHYSICIAN ASSISTANT

## 2024-09-23 PROCEDURE — 1159F MED LIST DOCD IN RCRD: CPT | Performed by: PHYSICIAN ASSISTANT

## 2024-09-23 PROCEDURE — 1125F AMNT PAIN NOTED PAIN PRSNT: CPT | Performed by: PHYSICIAN ASSISTANT

## 2024-09-23 RX ORDER — DEXTROMETHORPHAN HYDROBROMIDE AND PROMETHAZINE HYDROCHLORIDE 15; 6.25 MG/5ML; MG/5ML
5 SYRUP ORAL 4 TIMES DAILY PRN
Qty: 180 ML | Refills: 0 | Status: SHIPPED | OUTPATIENT
Start: 2024-09-23

## 2024-09-23 RX ORDER — FLUTICASONE PROPIONATE 50 UG/1
2 SPRAY, METERED NASAL DAILY
Qty: 9.9 ML | Refills: 0 | Status: SHIPPED | OUTPATIENT
Start: 2024-09-23

## 2024-09-23 RX ORDER — PREDNISONE 20 MG/1
TABLET ORAL
Qty: 7 TABLET | Refills: 0 | Status: SHIPPED | OUTPATIENT
Start: 2024-09-23

## 2024-10-07 DIAGNOSIS — F51.01 PRIMARY INSOMNIA: ICD-10-CM

## 2024-10-07 RX ORDER — DARIDOREXANT 50 MG/1
50 TABLET, FILM COATED ORAL NIGHTLY
Qty: 90 TABLET | Refills: 1 | Status: SHIPPED | OUTPATIENT
Start: 2024-10-07

## 2024-10-07 NOTE — TELEPHONE ENCOUNTER
Caller: Skye De Oliveira    Relationship: Self    Best call back number: [phone]     Requested Prescriptions:   Requested Prescriptions     Pending Prescriptions Disp Refills    Daridorexant HCl (Quviviq) 50 MG tablet 90 tablet 1     Si tablet Every Night.        Pharmacy where request should be sent: [facility] [address] [phone] [facility] [phone]      Last office visit with prescribing clinician: 2024   Last telemedicine visit with prescribing clinician: Visit date not found   Next office visit with prescribing clinician: Visit date not found     Does the patient have less than a 3 day supply:  [] Yes  [x] No    Would you like a call back once the refill request has been completed: [] Yes [] No    If the office needs to give you a call back, can they leave a voicemail: [] Yes [] No    Titus Sellers Rep   10/07/24 10:27 EDT

## 2024-10-14 ENCOUNTER — OFFICE VISIT (OUTPATIENT)
Age: 74
End: 2024-10-14
Payer: MEDICARE

## 2024-10-14 VITALS
DIASTOLIC BLOOD PRESSURE: 70 MMHG | BODY MASS INDEX: 23.75 KG/M2 | SYSTOLIC BLOOD PRESSURE: 110 MMHG | HEIGHT: 60 IN | WEIGHT: 121 LBS

## 2024-10-14 DIAGNOSIS — S93.492A SPRAIN OF ANTERIOR TALOFIBULAR LIGAMENT OF LEFT ANKLE, INITIAL ENCOUNTER: ICD-10-CM

## 2024-10-14 DIAGNOSIS — M19.072 LOCALIZED OSTEOARTHRITIS OF LEFT ANKLE: Primary | ICD-10-CM

## 2024-10-14 PROCEDURE — 99214 OFFICE O/P EST MOD 30 MIN: CPT | Performed by: STUDENT IN AN ORGANIZED HEALTH CARE EDUCATION/TRAINING PROGRAM

## 2024-10-14 PROCEDURE — 1160F RVW MEDS BY RX/DR IN RCRD: CPT | Performed by: STUDENT IN AN ORGANIZED HEALTH CARE EDUCATION/TRAINING PROGRAM

## 2024-10-14 PROCEDURE — 1159F MED LIST DOCD IN RCRD: CPT | Performed by: STUDENT IN AN ORGANIZED HEALTH CARE EDUCATION/TRAINING PROGRAM

## 2024-10-14 NOTE — PROGRESS NOTES
[facility] Office Follow Up Visit     Office Follow Up      Date: 10/14/2024   Patient Name: Skye De Oliveira  MRN: [id number]  YOB: 1950    Referring Physician: Referring, Self     Chief Complaint:   Chief Complaint   Patient presents with   • Follow-up     2 month recheck - left ankle pain- Stress fracture of left tibia     History of Present Illness: Skye De Oliveira is a 73 y.o. female who returns to clinic today for follow up on left ankle pain. Her pain is a 3 /10 on the pain scale. Patient has tried the following previous treatments bracing  and anti-inflammatories. She mentions current symptoms of pain  and swelling. She states that these treatments have worsened.    Subjective     Review of Systems: Review of Systems   Constitutional:  Negative for chills, fever, unexpected weight gain and unexpected weight loss.   HENT:  Negative for congestion, postnasal drip and rhinorrhea.    Eyes:  Negative for blurred vision.   Respiratory:  Negative for shortness of breath.    Cardiovascular:  Negative for leg swelling.   Gastrointestinal:  Negative for abdominal pain, nausea and vomiting.   Genitourinary:  Negative for difficulty urinating.   Musculoskeletal:  Positive for arthralgias and joint swelling. Negative for gait problem and myalgias.   Skin:  Negative for skin lesions and wound.   Neurological:  Negative for dizziness, weakness, light-headedness and numbness.   Hematological:  Does not bruise/bleed easily.   Psychiatric/Behavioral:  Negative for depressed mood.         Medications:   Current Outpatient Medications:   •  Cholecalciferol (Natural Vitamin D-3) 125 MCG (5000 UT) tablet, Take 1 tablet by mouth Every Evening., Disp: 180 tablet, Rfl: 0  •  Daridorexant HCl (Quviviq) 50 MG tablet, Take 1 tablet by mouth Every Night., Disp: 90 tablet, Rfl: 1  •  Diclofenac Sodium (Voltaren) 1 % gel gel, Apply 4 g topically to the appropriate area  "as directed 4 (Four) Times a Day As Needed (musculoskeletal pain)., Disp: 100 g, Rfl: 1  •  famotidine (PEPCID) 40 MG tablet, TAKE 1 TABLET DAILY, Disp: 90 tablet, Rfl: 3  •  fluticasone (FLONASE) 50 MCG/ACT nasal spray, Administer 2 sprays into the nostril(s) as directed by provider Daily., Disp: 9.9 mL, Rfl: 0  •  melatonin 5 MG tablet tablet, Take 1 tablet by mouth At Night As Needed., Disp: , Rfl:   •  multivitamin (THERAGRAN) tablet tablet, Take 1 tablet by mouth Daily., Disp: , Rfl:   •  omeprazole (priLOSEC) 40 MG capsule, TAKE 1 CAPSULE DAILY BEFORE A MEAL, Disp: 90 capsule, Rfl: 3  •  promethazine-dextromethorphan (PROMETHAZINE-DM) 6.25-15 MG/5ML syrup, Take 5 mL by mouth 4 (Four) Times a Day As Needed for Cough. Caution sedation., Disp: 180 mL, Rfl: 0  •  sertraline (ZOLOFT) 100 MG tablet, TAKE 1 TABLET DAILY, Disp: 90 tablet, Rfl: 3  •  traMADol (ULTRAM) 50 MG tablet, Take 1 tablet by mouth 4 (Four) Times a Day As Needed for Moderate Pain., Disp: 120 tablet, Rfl: 5  •  Vitamin D, Ergocalciferol, 99152 units capsule, Take 1 capsule by mouth 1 (One) Time Per Week., Disp: 7 capsule, Rfl: 0    Allergies:   Allergies   Allergen Reactions   • Penicillins Hives     Tolerates cephalospins well         I have reviewed and updated the patient's chief complaint, history of present illness, review of systems, past medical history, surgical history, family history, social history, medications and allergy list as appropriate.     Objective      Vital Signs:   Vitals:    10/14/24 0832   BP: 110/70   Weight: 54.9 kg (121 lb)   Height: 152.4 cm (60\")     Body mass index is 23.63 kg/m².  BMI is within normal parameters. No other follow-up for BMI required.    Patient reports that she is a non-smoker and has not ever been a smoker. This behavior was applauded and she was encouraged to continue in smoking cessation. We will continue to monitor at subsequent visits.     Ortho Exam:  Cardiovascular System: Arterial Pulses " Right: posterior tibialis normal and dorsalis pedis normal. Edema Right: no edema. Varicosities Right: no varicosities and capillary refill test normal.   Gait and Station: Appearance: normal gait, no limp, and ambulating with no assistive devices.   Left Ankles and Feet: Inspection: no erythema, induration, warmth, or deformity and normal alignment and swelling. Bony Palpation of the Ankle/Foot: no tenderness of the sesamoids, the calcaneal tuberosity, the navicular tuberosity, the tarsometatarsal joints, the dome of talus, the head of talus, the inferior tibiofibular joint, or the achilles tendon insertion and tenderness of the medial ankle. Soft Tissue Palpation of the Ankle/Foot: no tenderness of the tibialis posterior, the plantar fascia, the achilles tendon, the peroneus longus and brevis, the extensor hallucis longus, the sinus tarsi, the lateral anterior talofibular ligament, the anterior talofibular ligament, the calcaneofibular ligament, the posterior talofibular ligament, the peroneal retinaculum, the deltoid ligament, the spring ligament, or the retrocalcaneal bursae and tenderness of the tibialis anterior. Active Range of Motion: great toe flexion normal and extension normal and dorsiflexion normal, plantar flexion normal, inversion normal, and eversion normal. Stability Right: anterior drawer negative and talar tilt negative. Strength: extensor digitorum longus (5/5) and brevis (5/5); extensor hallucis longus (5/5); peroneus longus (5/5) and brevis (5/5); and posterior tibialis (5/5), tibialis anterior (5/5), and gastrocnemius (5/5). Inspection of the Toes: no callus, claw toes, or hammer toes.   Neurological System: Sensation on the Right: normal at the lateral plantar nerve, the medial plantar nerve, the superficial peroneal nerve, the deep peroneal nerve, the sural nerve, and the saphenous nerve and normal distal extremities. Special Tests on the Right: Everett's test negative, Hoffa's test  negative, Tinel's test negative, external rotation test negative, and squeeze test negative.   Skin: Right Lower Extremity: normal.     Results Review:   Imaging Results (Last 24 Hours)       ** No results found for the last 24 hours. **        I personally reviewed and interpreted radiographs of the left ankle from 6/26/2024.  No acute fracture or dislocation.  Mild to moderate degenerative changes are noted in the tibiotalar joint.    Procedures    Assessment / Plan      Assessment/Plan:   Diagnoses and all orders for this visit:    1. Localized osteoarthritis of left ankle (Primary)  -     Ambulatory Referral to Physical Therapy for Evaluation & Treatment    2. Sprain of anterior talofibular ligament of left ankle, initial encounter  -     Ambulatory Referral to Physical Therapy for Evaluation & Treatment    Patient here today with new left ankle complaints.  I previously seen her for a anterior tibial stress fracture.  She did well with activity modification, cam walking boot, and vitamin D supplementation.  Around that same time though she inverted her ankle and has had pain in the anterior and lateral ankle joint lines since then.  No swelling or bony tenderness today.  She does describe pain worse at the end of the day especially she has been standing and walking on surfaces such as concrete.  Does not bother her when playing golf.  Review of her ankle radiographs from around that time show mild to moderate ankle arthritis.  Her symptoms match this as well.  She likely suffered a sprain that aggravated her arthritis.  Given that she has no problems playing golf, recommend that she continue this physical activity.  Will take advantage of the coming changes in the weather finishing golf season by having her use the cam walking boot on occasion to calm down her pain especially after a busy day.  I will also give her a note for physical therapy.  This should control her symptoms but if not happy see her back.   Otherwise, as needed.    Follow Up:   Return if symptoms worsen or fail to improve.      Justin Suarez MD  [facility]  Answers submitted by the patient for this visit:  Other (Submitted on 10/9/2024)  Please describe your symptoms.: Left ankle swells and hurts walking on it  Have you had these symptoms before?: Yes  How long have you been having these symptoms?: Greater than 2 weeks  Primary Reason for Visit (Submitted on 10/9/2024)  What is the primary reason for your visit?: Problem Not Listed

## 2024-10-21 DIAGNOSIS — J00 ACUTE NASOPHARYNGITIS: ICD-10-CM

## 2024-10-23 RX ORDER — FLUTICASONE PROPIONATE 50 UG/1
SPRAY, METERED NASAL
Qty: 16 G | Refills: 10 | Status: SHIPPED | OUTPATIENT
Start: 2024-10-23

## 2024-11-12 ENCOUNTER — TELEPHONE (OUTPATIENT)
Dept: FAMILY MEDICINE CLINIC | Facility: CLINIC | Age: 74
End: 2024-11-12
Payer: MEDICARE

## 2024-11-12 NOTE — TELEPHONE ENCOUNTER
Patient called wanting a same day for a very sore throat and being hoarse. We do not have any availability today. I advised we could see her tomorrow or she could go to urgent care and patient stated she would go to urgent care.

## 2025-01-06 ENCOUNTER — TELEMEDICINE (OUTPATIENT)
Dept: FAMILY MEDICINE CLINIC | Facility: CLINIC | Age: 75
End: 2025-01-06
Payer: MEDICARE

## 2025-01-06 DIAGNOSIS — I10 PRIMARY HYPERTENSION: Primary | ICD-10-CM

## 2025-01-06 PROCEDURE — 99214 OFFICE O/P EST MOD 30 MIN: CPT | Performed by: PHYSICIAN ASSISTANT

## 2025-01-06 PROCEDURE — 1125F AMNT PAIN NOTED PAIN PRSNT: CPT | Performed by: PHYSICIAN ASSISTANT

## 2025-01-06 PROCEDURE — 1159F MED LIST DOCD IN RCRD: CPT | Performed by: PHYSICIAN ASSISTANT

## 2025-01-06 PROCEDURE — 1160F RVW MEDS BY RX/DR IN RCRD: CPT | Performed by: PHYSICIAN ASSISTANT

## 2025-01-06 RX ORDER — VALSARTAN 80 MG/1
80 TABLET ORAL DAILY
Qty: 30 TABLET | Refills: 0 | Status: SHIPPED | OUTPATIENT
Start: 2025-01-06

## 2025-01-06 NOTE — ASSESSMENT & PLAN NOTE
Her elevated blood pressure could be attributed to a combination of factors, including stress and steroid injections. However, the consistency of these symptoms suggests that steroids are likely not be the primary cause. A prescription for valsartan, to be taken once daily in the morning, will be provided.  She will likely need combination therapy, but I we will let her new PCP evaluate since she has an upcoming appointment.  I recommend that she seek care in the ER if she develops chest pain, dizziness, weakness, or shortness of breath.    With the onset of such high blood pressure, I do believe she would benefit from another cardiac evaluation, and she is in need of blood work to assess.  She expressed understanding, and she is in agreement to keep follow-up with her new PCP.    She is advised to monitor her sodium and caffeine intake. An annual eye examination is recommended due to the potential impact of high blood pressure on vision.

## 2025-01-06 NOTE — PROGRESS NOTES
Office Note     Name: Skye De Oliveira    : 1950     MRN: [id number]     Chief Complaint  Hypertension    Subjective   Patient was seen today through synchronous audio/video technology. Verbal consent was obtained. The patient was located at home. Jenny Herron PA-C was located at [facility] in [place], [place]. Vitals signs were not obtained due to lack of home monitoring access.     Skye De Oliveira is a 74 y.o. female via virtual visit for evaluation of elevated blood pressure.    She has been experiencing intermittent episodes of elevated blood pressure since the summer, which she initially dismissed as insignificant unless accompanied by facial flushing. Her systolic blood pressure readings have ranged from 180 to 200, with a diastolic reading of 94 today, although it is typically lower.  She has observed fluctuations in her blood pressure throughout the day, with a reading of 149 last night that increased to 180 within 20 minutes.  She has not been monitoring her heart rate closely, but notes that her diastolic blood pressure has varied between 50 and 100. She also reports experiencing occasional headaches but does not experience any chest pain or pressure. She has not experienced any lightheadedness or dizziness.  She has not initiated any new medications recently, and there has been no change in her diet or activity.     She denies a decrease in exercise tolerance. She maintains an active lifestyle during the summer, including golfing, but has been less active recently due to a stress fracture.     She consulted a cardiologist, Dr. Hsu, approximately a year ago, but no follow-up was deemed necessary at that time due to normal findings. She underwent gastric bypass surgery in  and has not required antihypertensive medication since then.    She has an upcoming appointment with Penny Wei on 2025 and will be establishing care with Lydia Wilson DO at [facility].       Supplemental Information  She has been receiving steroid injections in her hands and right knee every 3 months and is curious if this could be the reason for her elevated blood pressure.        Review of Systems:   Review of Systems   Constitutional:  Negative for activity change and diaphoresis.   Eyes:  Negative for blurred vision and visual disturbance.   Respiratory:  Negative for chest tightness and shortness of breath.    Cardiovascular:  Negative for chest pain and palpitations.   Neurological:  Positive for headache. Negative for dizziness.       Past Medical History:   Past Medical History:   Diagnosis Date    Abnormal EKG 09/20/2022    Adenomatous polyp of colon     Age-related osteoporosis without current pathological fracture     Ankle sprain 6/24    Anxiety     Breast injury 06/2024    lateral right breast injury    Chronic kidney disease     STAGE 2    Costochondral chest pain     Depression     DJD (degenerative joint disease)     MULTIPLE JOINTS    GERD without esophagitis     H/O mammogram 05/24/2016    [facility]    High risk medication use     History of COVID-19 01/2021    no hospital no steroids    Hypokalemia     Major depression in remission     PONV (postoperative nausea and vomiting)     Primary hypertension 1/6/2025    Primary insomnia     Primary osteoarthritis involving multiple joints     S/P bariatric surgery     Stress fracture 6/24    Left leg    Thyroid nodule     MULTIPLE    Vitamin D deficiency        Past Surgical History:   Past Surgical History:   Procedure Laterality Date    ABDOMINOPLASTY  2007    APPENDECTOMY      AUGMENTATION MAMMAPLASTY      BARIATRIC SURGERY  2014    Gastric Byass    BLADDER REPAIR  2011    [facility]    BREAST SURGERY  2008    REMOVED IMPLANTS AND REMOVED MORE FIROIDS    CATARACT EXTRACTION Bilateral     CHOLECYSTECTOMY  2014    s/p lap elicia/ VLAD for partial SBO by Dr. Booker 2/17/14.    COLONOSCOPY  2017    ENDOSCOPY N/A 10/19/2022    Procedure:  ESOPHAGOGASTRODUODENOSCOPY;  Surgeon: Ton Jurado MD;  Location:  [facility];  Service: General;  Laterality: N/A;    GASTRIC BYPASS  2014    with a 120 cm Minerva limb/ HHR by Dr. Shan Booker at [facility] 1/22/14    HAMMER TOE REPAIR      HIATAL HERNIA REPAIR N/A 10/19/2022    Procedure: HIATAL HERNIA REPAIR LAPAROSCOPIC;  Surgeon: Ton Jurado MD;  Location:  [facility];  Service: General;  Laterality: N/A;    HIP EXAMINATION UNDER ANESTHESIA Left     gluteus medius repair    HYSTERECTOMY  1974    AND UILATERAL OOPHHORETOMY    KNEE SURGERY Left 2010    arthroscopy debridement    MASTOPEXY Bilateral 1982    WITH IMPLANT RECONSTRUCTION    NISSEN FUNDOPLICATION N/A 10/19/2022    Procedure: NISSEN FUNDOPLICATION LAPAROSCOPIC;  Surgeon: Ton Jurado MD;  Location:  [facility];  Service: General;  Laterality: N/A;    OVARY SURGERY Right 1998    REMOVED    REFRACTIVE SURGERY Bilateral     SHOULDER SURGERY Bilateral     ROTATOR CUFF  RIGHT 2000 LEFT 2001    TONSILLECTOMY  1974    ADENOIODECTOMY    VOCAL CORD BIOPSY  1992    POLYPS REMOVED       Family History:   Family History   Problem Relation Age of Onset    Lung cancer Mother     Arthritis Mother     Cancer Mother         Lung cancer    Lung cancer Father 49    Cancer Father         Lung cancer    Diabetes Brother     Hearing loss Brother     Hypertension Brother     Breast cancer Neg Hx     Ovarian cancer Neg Hx     Endometrial cancer Neg Hx        Social History:   Social History     Socioeconomic History    Marital status:    Tobacco Use    Smoking status: Never    Smokeless tobacco: Never   Vaping Use    Vaping status: Never Used   Substance and Sexual Activity    Alcohol use: Not Currently    Drug use: Never    Sexual activity: Not Currently     Partners: Male     Comment: no hormones       Immunizations:   Immunization History   Administered Date(s) Administered    COVID-19 (Plasmonix) 12/14/2021     COVID-19 (MODERNA) 1st,2nd,3rd Dose Monovalent 03/10/2021, 04/12/2021, 12/14/2021    COVID-19 (UNSPECIFIED) 12/22/2021    Flu Vaccine Quad PF >36MO 10/07/2019    Fluzone High-Dose 65+YRS 10/07/2019, 09/29/2020, 10/05/2021    Fluzone High-Dose 65+yrs 09/29/2020, 10/05/2021, 01/09/2023    Hep A / Hep B 06/29/2018, 05/31/2019, 11/25/2019    Hepatitis A 06/29/2018, 05/31/2019, 11/25/2019    Hepatitis B Adult/Adolescent IM 06/29/2018, 05/31/2019, 11/25/2019    INFLUENZA SPLIT TRI 09/15/2021    Influenza, Unspecified 08/15/2021, 10/05/2021    PEDS-Pneumococcal Conjugate (PCV7) 10/01/2021    Pneumococcal Conjugate 13-Valent (PCV13) 12/29/2017    Pneumococcal Polysaccharide (PPSV23) 10/09/2018, 10/01/2021    Shingrix 05/31/2019, 09/01/2019    Tdap 07/09/2015    Zoster, Unspecified 05/06/2015, 05/31/2019, 09/01/2019        Medications:     Current Outpatient Medications:     Cholecalciferol (Natural Vitamin D-3) 125 MCG (5000 UT) tablet, Take 1 tablet by mouth Every Evening., Disp: 180 tablet, Rfl: 0    Daridorexant HCl (Quviviq) 50 MG tablet, Take 1 tablet by mouth Every Night., Disp: 90 tablet, Rfl: 1    Diclofenac Sodium (Voltaren) 1 % gel gel, Apply 4 g topically to the appropriate area as directed 4 (Four) Times a Day As Needed (musculoskeletal pain)., Disp: 100 g, Rfl: 1    famotidine (PEPCID) 40 MG tablet, TAKE 1 TABLET DAILY, Disp: 90 tablet, Rfl: 3    fluticasone (FLONASE) 50 MCG/ACT nasal spray, INSTILL 2 SPRAYS INTO EACH NOSTRIL EVERY DAY AS DIRECTED, Disp: 16 g, Rfl: 10    melatonin 5 MG tablet tablet, Take 1 tablet by mouth At Night As Needed., Disp: , Rfl:     multivitamin (THERAGRAN) tablet tablet, Take 1 tablet by mouth Daily., Disp: , Rfl:     omeprazole (priLOSEC) 40 MG capsule, TAKE 1 CAPSULE DAILY BEFORE A MEAL, Disp: 90 capsule, Rfl: 3    promethazine-dextromethorphan (PROMETHAZINE-DM) 6.25-15 MG/5ML syrup, Take 5 mL by mouth 4 (Four) Times a Day As Needed for Cough. Caution sedation., Disp: 180 mL, Rfl:  "0    sertraline (ZOLOFT) 100 MG tablet, TAKE 1 TABLET DAILY, Disp: 90 tablet, Rfl: 3    traMADol (ULTRAM) 50 MG tablet, Take 1 tablet by mouth 4 (Four) Times a Day As Needed for Moderate Pain., Disp: 120 tablet, Rfl: 5    valsartan (Diovan) 80 MG tablet, Take 1 tablet by mouth Daily., Disp: 30 tablet, Rfl: 0    Vitamin D, Ergocalciferol, 06246 units capsule, Take 1 capsule by mouth 1 (One) Time Per Week., Disp: 7 capsule, Rfl: 0    Allergies:   Allergies   Allergen Reactions    Penicillins Hives     Tolerates cephalospins well         Objective     Vital Signs  There were no vitals taken for this visit.  Estimated body mass index is 23.63 kg/m² as calculated from the following:    Height as of 10/14/24: 152.4 cm (60\").    Weight as of 10/14/24: 54.9 kg (121 lb).    BMI is within normal parameters. No other follow-up for BMI required.      Physical Exam  Vitals and nursing note reviewed.   Constitutional:       Appearance: Normal appearance.   HENT:      Head: Normocephalic.   Pulmonary:      Effort: Pulmonary effort is normal.   Neurological:      Mental Status: She is alert and oriented to person, place, and time.   Psychiatric:         Mood and Affect: Mood normal.         Behavior: Behavior normal.          Results:  No results found for this or any previous visit (from the past 24 hours).     Assessment and Plan       Diagnoses and all orders for this visit:    1. Primary hypertension (Primary)  Assessment & Plan:  Her elevated blood pressure could be attributed to a combination of factors, including stress and steroid injections. However, the consistency of these symptoms suggests that steroids are likely not be the primary cause. A prescription for valsartan, to be taken once daily in the morning, will be provided.  She will likely need combination therapy, but I we will let her new PCP evaluate since she has an upcoming appointment.  I recommend that she seek care in the ER if she develops chest pain, " dizziness, weakness, or shortness of breath.    With the onset of such high blood pressure, I do believe she would benefit from another cardiac evaluation, and she is in need of blood work to assess.  She expressed understanding, and she is in agreement to keep follow-up with her new PCP.    She is advised to monitor her sodium and caffeine intake. An annual eye examination is recommended due to the potential impact of high blood pressure on vision.    Orders:  -     valsartan (Diovan) 80 MG tablet; Take 1 tablet by mouth Daily.  Dispense: 30 tablet; Refill: 0        Follow Up  Return for next scheduled follow up later this week.    Jenny Herron PA-C  [facility]

## 2025-01-09 ENCOUNTER — OFFICE VISIT (OUTPATIENT)
Dept: FAMILY MEDICINE CLINIC | Facility: CLINIC | Age: 75
End: 2025-01-09
Payer: MEDICARE

## 2025-01-09 VITALS
HEIGHT: 60 IN | HEART RATE: 64 BPM | DIASTOLIC BLOOD PRESSURE: 74 MMHG | WEIGHT: 121.3 LBS | RESPIRATION RATE: 14 BRPM | BODY MASS INDEX: 23.81 KG/M2 | SYSTOLIC BLOOD PRESSURE: 124 MMHG

## 2025-01-09 DIAGNOSIS — I10 PRIMARY HYPERTENSION: Primary | ICD-10-CM

## 2025-01-09 PROCEDURE — 99213 OFFICE O/P EST LOW 20 MIN: CPT

## 2025-01-09 PROCEDURE — 1159F MED LIST DOCD IN RCRD: CPT

## 2025-01-09 PROCEDURE — 3074F SYST BP LT 130 MM HG: CPT

## 2025-01-09 PROCEDURE — 1126F AMNT PAIN NOTED NONE PRSNT: CPT

## 2025-01-09 PROCEDURE — 3078F DIAST BP <80 MM HG: CPT

## 2025-01-09 PROCEDURE — 1160F RVW MEDS BY RX/DR IN RCRD: CPT

## 2025-01-09 NOTE — PROGRESS NOTES
"Chief Complaint  Hypertension (Pt was seen on Monday and prescribed V[place]arton. )    Subjective          Skye De Oliveira presents to [facility]  History of Present Illness  Patient presents to the office today for a follow-up on hypertension.  She was evaluated via telemedicine with Quyen Herron PA-C earlier this week on 1/6/2025 for this.  Patient was started on valsartan 80 mg daily.  She started this medication yesterday and states she is doing well.  Her blood pressure today is 124/74. She states she was at work last night and had EMS check her blood pressure where it was in the 200s systolic.  She took her valsartan after this.  She denies any symptoms such as chest pain or shortness of breath.  She states that she overall is asymptomatic when her blood pressure is high aside from her blurred vision and headaches.  She has noted blurry vision that has been going on for quite some time for her.  She had lasik eye surgery in the past.  She has an appointment with her ophthalmologist on February 11th, 2025.  She is due for some blood work today.  She is establishing care with Lydia Wilson DO on 1/29/2025 at the [facility].  She has not taken her valsartan today as she did not know if she needed to take it this morning since she took it around 6 PM last night.      Objective   Vital Signs:   /74 (BP Location: Left arm, Patient Position: Sitting, Cuff Size: Adult)   Pulse 64   Resp 14   Ht 152.4 cm (60\")   Wt 55 kg (121 lb 4.8 oz)   BMI 23.69 kg/m²     Body mass index is 23.69 kg/m².    Review of Systems   Eyes:  Positive for blurred vision (Has been going on for quite some time per patient).   Respiratory:  Negative for shortness of breath.    Cardiovascular:  Negative for chest pain and leg swelling.   Neurological:  Positive for headache.       Past History:  Medical History: has a past medical history of Abnormal EKG (09/20/2022), Adenomatous polyp " of colon, Age-related osteoporosis without current pathological fracture, Ankle sprain (6/24), Anxiety, Breast injury (06/2024), Chronic kidney disease, Costochondral chest pain, Depression, DJD (degenerative joint disease), GERD without esophagitis, H/O mammogram (05/24/2016), High risk medication use, History of COVID-19 (01/2021), Hypokalemia, Major depression in remission, Osteopenia (2016), PONV (postoperative nausea and vomiting), Primary hypertension (01/06/2025), Primary insomnia, Primary osteoarthritis involving multiple joints, S/P bariatric surgery, Stress fracture (6/24), Thyroid nodule, and Vitamin D deficiency.   Surgical History: has a past surgical history that includes Cholecystectomy (2014); Colonoscopy (2017); Tonsillectomy (1974); Knee surgery (Left, 2010); Hammer Toe Repair; Abdominoplasty (2007); Gastric bypass (2014); Shoulder surgery (Bilateral); Ovary surgery (Right, 1998); Mastopexy (Bilateral, 1982); Breast surgery (2008); Bladder repair (2011); Vocal Cord Biopsy (1992); Hysterectomy (1974); Cataract extraction (Bilateral); Appendectomy; Refractive surgery (Bilateral); Hip examination under anesthesia (Left); Hiatal hernia repair (N/A, 10/19/2022); Nissen fundoplication (N/A, 10/19/2022); Esophagogastroduodenoscopy (N/A, 10/19/2022); Bariatric Surgery (2014); and Augmentation mammaplasty.   Family History: family history includes Arthritis in her mother; COPD in her father; Cancer in her father and mother; Diabetes in her brother; Hearing loss in her brother; Hypertension in her brother; Lung cancer in her mother; Lung cancer (age of onset: 49) in her father; Mental illness in her father.   Social History: reports that she has never smoked. She has never used smokeless tobacco. She reports that she does not currently use alcohol. She reports that she does not use drugs.      Current Outpatient Medications:     Cholecalciferol (Natural Vitamin D-3) 125 MCG (5000 UT) tablet, Take 1 tablet by  mouth Every Evening., Disp: 180 tablet, Rfl: 0    Daridorexant HCl (Quviviq) 50 MG tablet, Take 1 tablet by mouth Every Night., Disp: 90 tablet, Rfl: 1    famotidine (PEPCID) 40 MG tablet, TAKE 1 TABLET DAILY, Disp: 90 tablet, Rfl: 3    melatonin 5 MG tablet tablet, Take 1 tablet by mouth At Night As Needed., Disp: , Rfl:     multivitamin (THERAGRAN) tablet tablet, Take 1 tablet by mouth Daily., Disp: , Rfl:     omeprazole (priLOSEC) 40 MG capsule, TAKE 1 CAPSULE DAILY BEFORE A MEAL, Disp: 90 capsule, Rfl: 3    sertraline (ZOLOFT) 100 MG tablet, TAKE 1 TABLET DAILY, Disp: 90 tablet, Rfl: 3    traMADol (ULTRAM) 50 MG tablet, Take 1 tablet by mouth 4 (Four) Times a Day As Needed for Moderate Pain., Disp: 120 tablet, Rfl: 5    valsartan (Diovan) 80 MG tablet, Take 1 tablet by mouth Daily., Disp: 30 tablet, Rfl: 0    Vitamin D, Ergocalciferol, 86102 units capsule, Take 1 capsule by mouth 1 (One) Time Per Week., Disp: 7 capsule, Rfl: 0    Allergies: Penicillins    Physical Exam  Vitals and nursing note reviewed.   Constitutional:       General: She is not in acute distress.     Appearance: She is not ill-appearing or toxic-appearing.   HENT:      Head: Normocephalic and atraumatic.   Cardiovascular:      Rate and Rhythm: Normal rate and regular rhythm.      Pulses: Normal pulses.      Heart sounds: Normal heart sounds. No murmur heard.  Pulmonary:      Effort: Pulmonary effort is normal. No respiratory distress.      Breath sounds: Normal breath sounds. No wheezing, rhonchi or rales.   Skin:     General: Skin is warm.   Neurological:      General: No focal deficit present.      Mental Status: She is alert and oriented to person, place, and time. Mental status is at baseline.   Psychiatric:         Mood and Affect: Mood normal.         Behavior: Behavior normal.         Thought Content: Thought content normal.         Judgment: Judgment normal.          Result Review :            Telemedicine with Jenny Herron  JOAN Morgan (01/06/2025)        Assessment and Plan    Assessment & Plan  Primary hypertension  Blood pressure 124/74 in office today.  Patient denies chest pain, shortness of breath.  She does have headaches and blurry vision but reports this has been going on for quite some time.  Denies any changes to this.  Discussed with patient recommendation to check blood pressure more frequently at home.  Recommend that they sit upright with feet flat on the floor for 5 minutes prior to checking their blood pressure.  Recommend that they bring blood pressure cuff to office to ensure it is the correct size.  Recommend that they keep a log of their blood pressures to bring to future follow-up appointments.  If blood pressure is consistently staying > 140/90 notify office as follow-up may be needed sooner or medication changes may need to be made.  Recommend continuing valsartan 80 mg daily as prescribed.  Advised if they develop chest pain, shortness of breath, lower extremity edema, worsening vision or worsening headaches that they seek medical attention in ED.    Discussed with patient that her blood pressure is 124/74 in office this morning.  We discussed that there is concern if she takes her valsartan this morning that this would cause significant decrease in her blood pressure.  Recommend that she monitor her blood pressure throughout the day today to see if there are any elevations.  Discussed that this medication can cause some grogginess so she may prefer to take this in the evening.  She is going to take her personal blood pressure cuff with her to work today and compare the reading to that of the EMS employee who checks her blood pressure manually.  She is aware that she needs to take her medication if her blood pressure is staying greater than 130-140/90.  Recommend that she be evaluated by her ophthalmologist sooner.  We will update her CBC and CMP in office today and will contact her with those results when  they become available.  Orders:    CBC & Differential    Comprehensive Metabolic Panel    Ultimately recommend that if she is experiencing chest pain, shortness of breath, lower extremity edema, worsening visual disturbances or worsening headaches that she seek medical attention at ED.  We discussed the risks for MI/stroke with having elevated blood pressures, she is aware of this and is understanding of when to seek ED evaluation.  She is going to follow-up with Lydia Wilson DO on 1/29/2025.  We discussed that getting her blood pressure controlled may take some time as we figure out the correct medication regimen for her.  We discussed that a cardiology referral may be needed in the future.  Patient is agreeable to this and verbalized understanding of plan of care.    Follow Up   Return for Next scheduled follow up.  Patient was given instructions and counseling regarding her condition or for health maintenance advice. Please see specific information pulled into the AVS if appropriate.     HELEN Centeno

## 2025-01-09 NOTE — ASSESSMENT & PLAN NOTE
Blood pressure 124/74 in office today.  Patient denies chest pain, shortness of breath.  She does have headaches and blurry vision but reports this has been going on for quite some time.  Denies any changes to this.  Discussed with patient recommendation to check blood pressure more frequently at home.  Recommend that they sit upright with feet flat on the floor for 5 minutes prior to checking their blood pressure.  Recommend that they bring blood pressure cuff to office to ensure it is the correct size.  Recommend that they keep a log of their blood pressures to bring to future follow-up appointments.  If blood pressure is consistently staying > 140/90 notify office as follow-up may be needed sooner or medication changes may need to be made.  Recommend continuing valsartan 80 mg daily as prescribed.  Advised if they develop chest pain, shortness of breath, lower extremity edema, worsening vision or worsening headaches that they seek medical attention in ED.    Discussed with patient that her blood pressure is 124/74 in office this morning.  We discussed that there is concern if she takes her valsartan this morning that this would cause significant decrease in her blood pressure.  Recommend that she monitor her blood pressure throughout the day today to see if there are any elevations.  Discussed that this medication can cause some grogginess so she may prefer to take this in the evening.  She is going to take her personal blood pressure cuff with her to work today and compare the reading to that of the EMS employee who checks her blood pressure manually.  She is aware that she needs to take her medication if her blood pressure is staying greater than 130-140/90.  Recommend that she be evaluated by her ophthalmologist sooner.  We will update her CBC and CMP in office today and will contact her with those results when they become available.  Orders:    CBC & Differential    Comprehensive Metabolic Panel

## 2025-01-10 LAB
ALBUMIN SERPL-MCNC: 3.9 G/DL (ref 3.8–4.8)
ALP SERPL-CCNC: 106 IU/L (ref 44–121)
ALT SERPL-CCNC: 13 IU/L (ref 0–32)
AST SERPL-CCNC: 16 IU/L (ref 0–40)
BASOPHILS # BLD AUTO: 0 X10E3/UL (ref 0–0.2)
BASOPHILS NFR BLD AUTO: 0 %
BILIRUB SERPL-MCNC: <0.2 MG/DL (ref 0–1.2)
BUN SERPL-MCNC: 10 MG/DL (ref 8–27)
BUN/CREAT SERPL: 11 (ref 12–28)
CALCIUM SERPL-MCNC: 8.8 MG/DL (ref 8.7–10.3)
CHLORIDE SERPL-SCNC: 103 MMOL/L (ref 96–106)
CO2 SERPL-SCNC: 27 MMOL/L (ref 20–29)
CREAT SERPL-MCNC: 0.93 MG/DL (ref 0.57–1)
EGFRCR SERPLBLD CKD-EPI 2021: 65 ML/MIN/1.73
EOSINOPHIL # BLD AUTO: 0.1 X10E3/UL (ref 0–0.4)
EOSINOPHIL NFR BLD AUTO: 2 %
ERYTHROCYTE [DISTWIDTH] IN BLOOD BY AUTOMATED COUNT: 13.3 % (ref 11.7–15.4)
GLOBULIN SER CALC-MCNC: 1.6 G/DL (ref 1.5–4.5)
GLUCOSE SERPL-MCNC: 64 MG/DL (ref 70–99)
HCT VFR BLD AUTO: 38.4 % (ref 34–46.6)
HGB BLD-MCNC: 11.8 G/DL (ref 11.1–15.9)
IMM GRANULOCYTES # BLD AUTO: 0 X10E3/UL (ref 0–0.1)
IMM GRANULOCYTES NFR BLD AUTO: 0 %
LYMPHOCYTES # BLD AUTO: 1.8 X10E3/UL (ref 0.7–3.1)
LYMPHOCYTES NFR BLD AUTO: 32 %
MCH RBC QN AUTO: 28.9 PG (ref 26.6–33)
MCHC RBC AUTO-ENTMCNC: 30.7 G/DL (ref 31.5–35.7)
MCV RBC AUTO: 94 FL (ref 79–97)
MONOCYTES # BLD AUTO: 0.5 X10E3/UL (ref 0.1–0.9)
MONOCYTES NFR BLD AUTO: 9 %
NEUTROPHILS # BLD AUTO: 3.2 X10E3/UL (ref 1.4–7)
NEUTROPHILS NFR BLD AUTO: 57 %
PLATELET # BLD AUTO: 306 X10E3/UL (ref 150–450)
POTASSIUM SERPL-SCNC: 5.1 MMOL/L (ref 3.5–5.2)
PROT SERPL-MCNC: 5.5 G/DL (ref 6–8.5)
RBC # BLD AUTO: 4.09 X10E6/UL (ref 3.77–5.28)
SODIUM SERPL-SCNC: 138 MMOL/L (ref 134–144)
WBC # BLD AUTO: 5.6 X10E3/UL (ref 3.4–10.8)

## 2025-01-10 NOTE — PROGRESS NOTES
Labs are stable or show slight abnormalities that are stable compared to previous labs. Recommend you keep all scheduled follow ups with PCP.

## 2025-01-13 ENCOUNTER — OFFICE VISIT (OUTPATIENT)
Age: 75
End: 2025-01-13
Payer: MEDICARE

## 2025-01-13 VITALS
HEIGHT: 60 IN | SYSTOLIC BLOOD PRESSURE: 164 MMHG | DIASTOLIC BLOOD PRESSURE: 84 MMHG | WEIGHT: 125 LBS | TEMPERATURE: 97.3 F | HEART RATE: 72 BPM | BODY MASS INDEX: 24.54 KG/M2

## 2025-01-13 DIAGNOSIS — M15.0 PRIMARY OSTEOARTHRITIS INVOLVING MULTIPLE JOINTS: Primary | Chronic | ICD-10-CM

## 2025-01-13 DIAGNOSIS — Z51.81 ENCOUNTER FOR MEDICATION MONITORING: Chronic | ICD-10-CM

## 2025-01-13 PROBLEM — M19.90 OSTEOARTHRITIS: Status: RESOLVED | Noted: 2022-11-08 | Resolved: 2025-01-13

## 2025-01-13 PROBLEM — R11.0 NAUSEA: Status: RESOLVED | Noted: 2022-11-08 | Resolved: 2025-01-13

## 2025-01-13 PROCEDURE — 3079F DIAST BP 80-89 MM HG: CPT | Performed by: INTERNAL MEDICINE

## 2025-01-13 PROCEDURE — 1160F RVW MEDS BY RX/DR IN RCRD: CPT | Performed by: INTERNAL MEDICINE

## 2025-01-13 PROCEDURE — G2211 COMPLEX E/M VISIT ADD ON: HCPCS | Performed by: INTERNAL MEDICINE

## 2025-01-13 PROCEDURE — 3077F SYST BP >= 140 MM HG: CPT | Performed by: INTERNAL MEDICINE

## 2025-01-13 PROCEDURE — 1159F MED LIST DOCD IN RCRD: CPT | Performed by: INTERNAL MEDICINE

## 2025-01-13 PROCEDURE — 99214 OFFICE O/P EST MOD 30 MIN: CPT | Performed by: INTERNAL MEDICINE

## 2025-01-13 RX ORDER — TRAMADOL HYDROCHLORIDE 50 MG/1
50 TABLET ORAL 4 TIMES DAILY PRN
Qty: 120 TABLET | Refills: 5 | Status: SHIPPED | OUTPATIENT
Start: 2025-01-13

## 2025-01-13 NOTE — ASSESSMENT & PLAN NOTE
* Tramadol 50 mg every 6 hours PRN For pain   * Controlled substance agreement was signed/updated as of 7/5/24  Check APOORVA and Drug screen as required.  Drug screen ordered today    Orders:    Urine Drug Screen - Urine, Clean Catch; Future    traMADol (ULTRAM) 50 MG tablet; Take 1 tablet by mouth 4 (Four) Times a Day As Needed for Moderate Pain.

## 2025-01-13 NOTE — PROGRESS NOTES
Office Follow Up      Date: 01/13/2025   Patient Name: Skye De Oliveira  MRN: [id number]  YOB: 1950    Referring Physician: Beverley Flores Ra*     Chief Complaint   Patient presents with    Osteoarthritis     Follow up       History of Present Illness: Skye De Oliveira is a 74 y.o. female who is here today for follow up.    We have prescribed her Tramadol. She needs this refilled. No recent serious injuries or infections. No fever.     She rates her pain today as 4/10 in severity. She has 15 minutes/day of morning stiffness. No red or hot joints. Her joints swell. No muscle pain or weakness. No back or neck problems.     No rash. She notes hair loss. No lymphadenopathy. No abnormal bruising/bleeding. No GI or  issues. No shortness of breath or chest pain. No sicca symptoms. She has headaches. No paresthesias.    Subjective     Review of Systems   Constitutional: Negative.    HENT: Negative.     Eyes:  Positive for blurred vision.   Respiratory: Negative.     Cardiovascular: Negative.    Gastrointestinal: Negative.    Endocrine: Negative.    Genitourinary: Negative.    Musculoskeletal:  Positive for arthralgias and joint swelling.   Skin: Negative.    Allergic/Immunologic: Negative.    Neurological:  Positive for headache.   Hematological: Negative.    Psychiatric/Behavioral: Negative.     All other systems reviewed and are negative.         Current Outpatient Medications:     Cholecalciferol (Natural Vitamin D-3) 125 MCG (5000 UT) tablet, Take 1 tablet by mouth Every Evening., Disp: 180 tablet, Rfl: 0    Daridorexant HCl (Quviviq) 50 MG tablet, Take 1 tablet by mouth Every Night., Disp: 90 tablet, Rfl: 1    famotidine (PEPCID) 40 MG tablet, TAKE 1 TABLET DAILY, Disp: 90 tablet, Rfl: 3    melatonin 5 MG tablet tablet, Take 1 tablet by mouth At Night As Needed., Disp: , Rfl:     multivitamin (THERAGRAN) tablet tablet, Take 1 tablet by mouth Daily., Disp: , Rfl:      "omeprazole (priLOSEC) 40 MG capsule, TAKE 1 CAPSULE DAILY BEFORE A MEAL, Disp: 90 capsule, Rfl: 3    sertraline (ZOLOFT) 100 MG tablet, TAKE 1 TABLET DAILY, Disp: 90 tablet, Rfl: 3    traMADol (ULTRAM) 50 MG tablet, Take 1 tablet by mouth 4 (Four) Times a Day As Needed for Moderate Pain., Disp: 120 tablet, Rfl: 5    valsartan (Diovan) 80 MG tablet, Take 1 tablet by mouth Daily., Disp: 30 tablet, Rfl: 0    Vitamin D, Ergocalciferol, 43444 units capsule, Take 1 capsule by mouth 1 (One) Time Per Week., Disp: 7 capsule, Rfl: 0    Allergies   Allergen Reactions    Penicillins Hives     Tolerates cephalospins well         I have reviewed and updated the patient's chief complaint, history of present illness, review of systems, past medical history, surgical history, family history, social history, medications and allergy list as appropriate.     Objective      Vitals:    01/13/25 1354   BP: 164/84   BP Location: Left arm   Patient Position: Sitting   Cuff Size: Adult   Pulse: 72   Temp: 97.3 °F (36.3 °C)   Weight: 56.7 kg (125 lb)   Height: 152.4 cm (60\")   PainSc:   4     Body mass index is 24.41 kg/m².      Physical Exam     General: Well appearing 74 year old  male. Not in distress. He is ambulating unassisted.   SKIN: No rashes. No alopecia. No subcutaneous nodules. No digital pits or ulcers. No sclerodactyly.   HEENT: NCAT. Conjunctiva clear, no photophobia. No oral or nasal ulcers. Hearing intact.    Pulmonary: Clear to auscultation bilaterally. No wheezing, rales, or rhonchi.  CV: Regular rate and rhythm. No murmurs, rubs, or gallops.   Psych: Normal mood and affect. Alert and oriented x 3.   Extremities: No cyanosis or edema.   Musculoskeletal: No joint swelling or tenderness to palpation. No warmth or erythema. Normal range of motion of the wrists, ankles, elbows, and knees. She has [place] and [place] nodes bilaterally.   Lymph: No palpable cervical adenopathy    Procedures    Assessment / Plan  "     Assessment & Plan  Primary osteoarthritis involving multiple joints  * Medications/treatments/interventions tried include: Tylenol, Celebrex, meloxicam, Naproxen, shoulder surgery, she has seen an orthopaedic surgeon, she has done some physical therapy, Tramadol, Advil, she has seen a a hand surgeon (Dr. Chanel), she has had injections in her finger and thumbs, copper fit gloves, she has had knee injections (gel and cortisone)     1. Tylenol PRN is ok as directed  2. She has tried taking various oral NSAIDS   3. She has had gastric bypass surgery. Typically we try and limit/avoid oral NSAIDS in patients who have had gastric bypass   4. She has done some physical therapy   5. She has seen orthopaedic surgeons   6. She had shoulder surgery   7. Continue/refill Tramadol   8. We gave her a handout on osteoarthritis to take home and review   9. Follow up in 6 months   10.  She has seen a hand surgeon   11. She has had injections in her fingers and thumbs  12. She has tried wearing copper fit gloves   13. She has had knee injections (Gel and cortisone)   Orders:    Urine Drug Screen - Urine, Clean Catch; Future    Encounter for medication monitoring  * Tramadol 50 mg every 6 hours PRN For pain   * Controlled substance agreement was signed/updated as of 7/5/24  Check APOORVA and Drug screen as required.  Drug screen ordered today    Orders:    Urine Drug Screen - Urine, Clean Catch; Future    traMADol (ULTRAM) 50 MG tablet; Take 1 tablet by mouth 4 (Four) Times a Day As Needed for Moderate Pain.      Follow Up:   Return in about 6 months (around 7/13/2025).      William Quan DO  [facility]

## 2025-01-24 ENCOUNTER — OFFICE VISIT (OUTPATIENT)
Age: 75
End: 2025-01-24
Payer: MEDICARE

## 2025-01-24 VITALS
DIASTOLIC BLOOD PRESSURE: 70 MMHG | HEIGHT: 61 IN | SYSTOLIC BLOOD PRESSURE: 110 MMHG | BODY MASS INDEX: 23.3 KG/M2 | WEIGHT: 123.4 LBS

## 2025-01-24 DIAGNOSIS — M25.571 RIGHT ANKLE PAIN, UNSPECIFIED CHRONICITY: Primary | ICD-10-CM

## 2025-01-24 DIAGNOSIS — M84.361A STRESS FRACTURE OF RIGHT TIBIA, INITIAL ENCOUNTER: ICD-10-CM

## 2025-01-24 RX ORDER — ERGOCALCIFEROL 1.25 MG/1
1 CAPSULE ORAL WEEKLY
Qty: 7 CAPSULE | Refills: 0 | Status: SHIPPED | OUTPATIENT
Start: 2025-01-24

## 2025-01-24 NOTE — PROGRESS NOTES
[facility] Office Visit     Office Visit       Date: 01/24/2025   Patient Name: Syke De Oliveira  MRN: [id number]  YOB: 1950    Referring Physician: Lydia Wilson DO     Chief Complaint:   Chief Complaint   Patient presents with    Right Ankle - Pain     History of Present Illness:   Skye De Oliveira is a 74 y.o. female who presents with new problem of: right ankle pain.  Onset: atraumatic and gradual in nature. The issue has been ongoing for 2 year(s). Pain is a 5/10 on the pain scale. Pain is described as aching. Associated symptoms include pain and swelling. The pain is worse with walking, standing, and climbing stairs; resting and pain medication and/or NSAID improve the pain. Previous treatments have included: NSAIDS.    Subjective   Review of Systems: Review of Systems   Constitutional:  Negative for chills, fever, unexpected weight gain and unexpected weight loss.   HENT:  Negative for congestion, postnasal drip and rhinorrhea.    Eyes:  Negative for blurred vision.   Respiratory:  Negative for shortness of breath.    Cardiovascular:  Negative for leg swelling.   Gastrointestinal:  Negative for abdominal pain, nausea and vomiting.   Genitourinary:  Negative for difficulty urinating.   Musculoskeletal:  Positive for arthralgias. Negative for gait problem, joint swelling and myalgias.   Skin:  Negative for skin lesions and wound.   Neurological:  Negative for dizziness, weakness, light-headedness and numbness.   Hematological:  Does not bruise/bleed easily.   Psychiatric/Behavioral:  Negative for depressed mood.         I have reviewed the following portions of the patient's history:History of Present Illness and review of systems.    Past Medical History:   Past Medical History:   Diagnosis Date    Abnormal EKG 09/20/2022    Adenomatous polyp of colon     Age-related osteoporosis without current pathological fracture     Ankle sprain 6/24     Anxiety     Breast injury 06/2024    lateral right breast injury    Chronic kidney disease     STAGE 2    Costochondral chest pain     Depression     DJD (degenerative joint disease)     MULTIPLE JOINTS    GERD without esophagitis     H/O mammogram 05/24/2016    NLM    High risk medication use     History of COVID-19 01/2021    no hospital no steroids    Hypokalemia     Knee swelling     Major depression in remission     Osteopenia 2016    PONV (postoperative nausea and vomiting)     Primary hypertension 01/06/2025    Primary insomnia     Primary osteoarthritis involving multiple joints     S/P bariatric surgery     Stress fracture 6/24    Left leg    Thyroid nodule     MULTIPLE    Vitamin D deficiency        Past Surgical History:   Past Surgical History:   Procedure Laterality Date    ABDOMINOPLASTY  2007    APPENDECTOMY      AUGMENTATION MAMMAPLASTY      BARIATRIC SURGERY  2014    Gastric Byass    BLADDER REPAIR  2011    [facility]    BREAST SURGERY  2008    REMOVED IMPLANTS AND REMOVED MORE FIROIDS    CATARACT EXTRACTION Bilateral     CHOLECYSTECTOMY  2014    s/p lap elicia/ VLAD for partial SBO by Dr. Booker 2/17/14.    COLONOSCOPY  2017    ENDOSCOPY N/A 10/19/2022    Procedure: ESOPHAGOGASTRODUODENOSCOPY;  Surgeon: Ton Jurado MD;  Location:  [facility];  Service: General;  Laterality: N/A;    GASTRIC BYPASS  2014    with a 120 cm Minerva limb/ HHR by Dr. Shan Booker at [facility] 1/22/14    HAMMER TOE REPAIR      HIATAL HERNIA REPAIR N/A 10/19/2022    Procedure: HIATAL HERNIA REPAIR LAPAROSCOPIC;  Surgeon: Ton Jurado MD;  Location:  [facility];  Service: General;  Laterality: N/A;    HIP EXAMINATION UNDER ANESTHESIA Left     gluteus medius repair    HIP SURGERY  2022    Repair muscle    HYSTERECTOMY  1974    AND UILATERAL OOPHHORETOMY    KNEE SURGERY Left 2010    arthroscopy debridement    MASTOPEXY Bilateral 1982    WITH IMPLANT RECONSTRUCTION    NISSEN  FUNDOPLICATION N/A 10/19/2022    Procedure: NISSEN FUNDOPLICATION LAPAROSCOPIC;  Surgeon: Ton Jurado MD;  Location[facility];  Service: General;  Laterality: N/A;    OVARY SURGERY Right 1998    REMOVED    REFRACTIVE SURGERY Bilateral     SHOULDER SURGERY Bilateral     ROTATOR CUFF  RIGHT 2000 LEFT 2001    TONSILLECTOMY  1974    ADENOIODECTOMY    VOCAL CORD BIOPSY  1992    POLYPS REMOVED       Family History:   Family History   Problem Relation Age of Onset    Lung cancer Mother     Arthritis Mother     Cancer Mother         Lung cancer    Lung cancer Father 49    Cancer Father         Lung cancer    COPD Father     Mental illness Father         Schizophrenia    Diabetes Brother     Hearing loss Brother     Hypertension Brother     Cancer Brother         Prostrate cancer    Breast cancer Neg Hx     Ovarian cancer Neg Hx     Endometrial cancer Neg Hx        Social History:   Social History     Socioeconomic History    Marital status:    Tobacco Use    Smoking status: Never    Smokeless tobacco: Never   Vaping Use    Vaping status: Never Used   Substance and Sexual Activity    Alcohol use: Not Currently    Drug use: Never    Sexual activity: Not Currently     Partners: Male     Birth control/protection: Hysterectomy     Comment: no hormones       Medications:   Current Outpatient Medications:     Cholecalciferol (Natural Vitamin D-3) 125 MCG (5000 UT) tablet, Take 1 tablet by mouth Every Evening., Disp: 180 tablet, Rfl: 0    Daridorexant HCl (Quviviq) 50 MG tablet, Take 1 tablet by mouth Every Night., Disp: 90 tablet, Rfl: 1    famotidine (PEPCID) 40 MG tablet, TAKE 1 TABLET DAILY, Disp: 90 tablet, Rfl: 3    melatonin 5 MG tablet tablet, Take 1 tablet by mouth At Night As Needed., Disp: , Rfl:     multivitamin (THERAGRAN) tablet tablet, Take 1 tablet by mouth Daily., Disp: , Rfl:     omeprazole (priLOSEC) 40 MG capsule, TAKE 1 CAPSULE DAILY BEFORE A MEAL, Disp: 90 capsule, Rfl: 3    sertraline  "(ZOLOFT) 100 MG tablet, TAKE 1 TABLET DAILY, Disp: 90 tablet, Rfl: 3    traMADol (ULTRAM) 50 MG tablet, Take 1 tablet by mouth 4 (Four) Times a Day As Needed for Moderate Pain., Disp: 120 tablet, Rfl: 5    valsartan (Diovan) 80 MG tablet, Take 1 tablet by mouth Daily., Disp: 30 tablet, Rfl: 0    Vitamin D, Ergocalciferol, 97704 units capsule, Take 1 capsule by mouth 1 (One) Time Per Week., Disp: 7 capsule, Rfl: 0    Allergies:   Allergies   Allergen Reactions    Penicillins Hives     Tolerates cephalospins well         I reviewed the patient's chief complaint, history of present illness, review of systems, past medical history, surgical history, family history, social history, medications and allergy list.     Objective    Vital Signs:   Vitals:    01/24/25 0843   BP: 110/70   Weight: 56 kg (123 lb 6.4 oz)   Height: 154.9 cm (61\")     Body mass index is 23.32 kg/m².   BMI is within normal parameters. No other follow-up for BMI required.     Patient reports that she is a non-smoker and has not ever been a smoker.  This behavior was applauded and she was encouraged to continue in smoking cessation.  We will continue to monitor at subsequent visits.    Ortho Exam:  Cardiovascular System: Arterial Pulses Right: dorsalis pedis normal and posterior tibialis normal. Arterial Pulses Left: posterior tibialis normal. Edema Right: none. Edema Left: none. Varicosities Right: capillary refill test normal. Varicosities Left: capillary refill test normal.   Lower Legs: Inspection Right: no mass, induration, warmth, erythema, swelling, or ecchymosis. Bony Palpation Right: no tenderness of the proximal tibia, the distal tibia, the proximal fibula, the mid fibula, or the distal fibula and tenderness of the mid tibia medial (anterior). Bony Palpation Left: no tenderness of the proximal tibia, the mid tibia, the distal tibia, the proximal fibula, the mid fibula, or the distal fibula. Soft Tissue Palpation Right: no tenderness of the " lateral tibial muscle compartment, the posterior tibial muscle compartment, the gastrocnemius, or the achilles tendon and tenderness of the anterior tibial muscle compartment. Soft Tissue Palpation Left: no tenderness of the anterior tibial muscle compartment, the lateral tibial muscle compartment, the posterior tibial muscle compartment, the gastrocnemius, or the achilles tendon. Active Range of Motion Right: knee flexion normal and extension normal and ankle dorsiflexion normal, plantar flexion normal, inversion normal, and eversion normal. Active Range of Motion Left: knee flexion normal and extension normal and ankle dorsiflexion normal, plantar flexion normal, inversion normal, and eversion normal. Stability Right: anterior drawer negative. Stability Left: anterior drawer negative. Right Lower Leg Strength extensor hallucis longus 5/5 and digitorum longus 5/5; peroneus longus 5/5 and brevis 5/5; knee flexion 5/5 and extension 5/5; and lateral rotation of flexed leg 5/5, medial rotation of flexed leg 5/5, tibialis anterior 5/5, posterior tibialis 5/5, and gastrocnemius 5/5. Left Lower Leg Strength extensor hallucis longus 5/5 and digitorum longus 5/5; peroneus longus 5/5 and brevis 5/5; knee flexion 5/5 and extension 5/5; and lateral rotation of flexed leg 5/5, medial rotation of flexed leg 5/5, tibialis anterior 5/5, posterior tibialis 5/5, and gastrocnemius 5/5.   Skin: Right Lower Extremity: normal. Left Lower Extremity: normal.   Neurologic: Sensation on the Right: L2 normal, L3 normal, L4 normal, L5 normal, S1 normal, S2 normal, and S3,4,5 normal. Sensation on the Left: L2 normal, L3 normal, L4 normal, L5 normal, S1 normal, S2 normal, and S3,4,5 normal.     Results Review:   Imaging Results (Last 24 Hours)       Procedure Component Value Units Date/Time    XR Ankle 3+ View Right [[id number]] Resulted: 01/24/25 0915     Updated: 01/24/25 0915    Narrative:      Indication: Right ankle pain.     Views: AP  lateral and mortise views of the ankle are submitted.     Impression: The mortise is congruent. There is no widening. There is no   fracture subluxation or dislocation. There are no acute changes. Diffuse   osteopenia. Minimal degenerative changes in the ankle joint.     Comparison: Unchanged compared to nonweight bearing x rays from 1/21/2025.              Procedures    Assessment / Plan    Assessment/Plan:   Diagnoses and all orders for this visit:    1. Right ankle pain, unspecified chronicity (Primary)  -     XR Ankle 3+ View Right    2. Stress fracture of right tibia, initial encounter  -     Vitamin D, Ergocalciferol, 22471 units capsule; Take 1 capsule by mouth 1 (One) Time Per Week.  Dispense: 7 capsule; Refill: 0    Patient presents today for evaluation of right tibia pain.  No acute mechanism of injury though pain has been bothering her for the last 1 month.  Pain directly over the anterior cortex of the tibia.  Radiographs showed osteopenia but no clear fracture line.  She has had a previous left tibial stress fracture that was well treated with nonoperative management.  We will forego imaging at this time.  Will place her into a walking boot for weightbearing activity.  Prescribed vitamin D 50,000 units weekly in addition to her regular vitamin D supplementation.  I will see her back in 4 weeks to monitor her response.  Do not need additional radiographs.    Previous imaging studies reviewed: 1/21/2025-radiographs of the right ankle.    Previous laboratory results reviewed: 1/9/2025-eGFR 65.    Documentation reviewed: 1/9/2025-office visit-HELEN Solo.    Follow Up:   Return in about 4 weeks (around 2/21/2025) for Recheck.      Justin Suarez MD  [facility]

## 2025-01-29 ENCOUNTER — OFFICE VISIT (OUTPATIENT)
Dept: FAMILY MEDICINE CLINIC | Facility: CLINIC | Age: 75
End: 2025-01-29
Payer: MEDICARE

## 2025-01-29 VITALS
SYSTOLIC BLOOD PRESSURE: 144 MMHG | HEART RATE: 76 BPM | DIASTOLIC BLOOD PRESSURE: 92 MMHG | RESPIRATION RATE: 16 BRPM | BODY MASS INDEX: 25.17 KG/M2 | OXYGEN SATURATION: 95 % | HEIGHT: 60 IN | TEMPERATURE: 97.5 F | WEIGHT: 128.2 LBS

## 2025-01-29 DIAGNOSIS — Z00.00 MEDICARE ANNUAL WELLNESS VISIT, SUBSEQUENT: Primary | ICD-10-CM

## 2025-01-29 DIAGNOSIS — F41.9 ANXIETY: ICD-10-CM

## 2025-01-29 DIAGNOSIS — Z51.81 MEDICATION MONITORING ENCOUNTER: ICD-10-CM

## 2025-01-29 DIAGNOSIS — M84.361A STRESS FRACTURE OF RIGHT TIBIA, INITIAL ENCOUNTER: ICD-10-CM

## 2025-01-29 DIAGNOSIS — Z91.81 AT HIGH RISK FOR FALLS: ICD-10-CM

## 2025-01-29 DIAGNOSIS — F51.01 PRIMARY INSOMNIA: ICD-10-CM

## 2025-01-29 DIAGNOSIS — I10 PRIMARY HYPERTENSION: Chronic | ICD-10-CM

## 2025-01-29 DIAGNOSIS — K21.9 GASTROESOPHAGEAL REFLUX DISEASE WITHOUT ESOPHAGITIS: ICD-10-CM

## 2025-01-29 DIAGNOSIS — Z78.0 MENOPAUSE: ICD-10-CM

## 2025-01-29 DIAGNOSIS — E53.8 VITAMIN B12 DEFICIENCY: ICD-10-CM

## 2025-01-29 DIAGNOSIS — E55.9 VITAMIN D DEFICIENCY: ICD-10-CM

## 2025-01-29 DIAGNOSIS — Z98.84 S/P BARIATRIC SURGERY: ICD-10-CM

## 2025-01-29 LAB
POC AMPHETAMINES: NEGATIVE
POC BARBITURATES: NEGATIVE
POC BENZODIAZEPHINES: NEGATIVE
POC COCAINE: NEGATIVE
POC METHADONE: NEGATIVE
POC METHAMPHETAMINE SCREEN URINE: NEGATIVE
POC OPIATES: NEGATIVE
POC OXYCODONE: NEGATIVE
POC PHENCYCLIDINE: NEGATIVE
POC PROPOXYPHENE: NEGATIVE
POC THC: NEGATIVE
POC TRICYCLIC ANTIDEPRESSANTS: NEGATIVE

## 2025-01-29 PROCEDURE — 1125F AMNT PAIN NOTED PAIN PRSNT: CPT | Performed by: STUDENT IN AN ORGANIZED HEALTH CARE EDUCATION/TRAINING PROGRAM

## 2025-01-29 PROCEDURE — 3077F SYST BP >= 140 MM HG: CPT | Performed by: STUDENT IN AN ORGANIZED HEALTH CARE EDUCATION/TRAINING PROGRAM

## 2025-01-29 PROCEDURE — 99214 OFFICE O/P EST MOD 30 MIN: CPT | Performed by: STUDENT IN AN ORGANIZED HEALTH CARE EDUCATION/TRAINING PROGRAM

## 2025-01-29 PROCEDURE — 3080F DIAST BP >= 90 MM HG: CPT | Performed by: STUDENT IN AN ORGANIZED HEALTH CARE EDUCATION/TRAINING PROGRAM

## 2025-01-29 PROCEDURE — 1170F FXNL STATUS ASSESSED: CPT | Performed by: STUDENT IN AN ORGANIZED HEALTH CARE EDUCATION/TRAINING PROGRAM

## 2025-01-29 PROCEDURE — 1159F MED LIST DOCD IN RCRD: CPT | Performed by: STUDENT IN AN ORGANIZED HEALTH CARE EDUCATION/TRAINING PROGRAM

## 2025-01-29 PROCEDURE — G0439 PPPS, SUBSEQ VISIT: HCPCS | Performed by: STUDENT IN AN ORGANIZED HEALTH CARE EDUCATION/TRAINING PROGRAM

## 2025-01-29 PROCEDURE — 1160F RVW MEDS BY RX/DR IN RCRD: CPT | Performed by: STUDENT IN AN ORGANIZED HEALTH CARE EDUCATION/TRAINING PROGRAM

## 2025-01-29 RX ORDER — SERTRALINE HYDROCHLORIDE 100 MG/1
100 TABLET, FILM COATED ORAL DAILY
Qty: 90 TABLET | Refills: 3 | Status: SHIPPED | OUTPATIENT
Start: 2025-01-29

## 2025-01-29 RX ORDER — OMEPRAZOLE 40 MG/1
40 CAPSULE, DELAYED RELEASE ORAL DAILY
Qty: 90 CAPSULE | Refills: 3 | Status: SHIPPED | OUTPATIENT
Start: 2025-01-29

## 2025-01-29 RX ORDER — DARIDOREXANT 50 MG/1
50 TABLET, FILM COATED ORAL NIGHTLY
Qty: 90 TABLET | Refills: 1 | Status: SHIPPED | OUTPATIENT
Start: 2025-01-29

## 2025-01-29 RX ORDER — FEXOFENADINE HYDROCHLORIDE 180 MG/1
1 TABLET, FILM COATED ORAL DAILY
COMMUNITY
Start: 2025-01-24

## 2025-01-29 RX ORDER — ERGOCALCIFEROL 1.25 MG/1
1 CAPSULE ORAL WEEKLY
Qty: 12 CAPSULE | Refills: 3 | Status: SHIPPED | OUTPATIENT
Start: 2025-01-29

## 2025-01-29 RX ORDER — FAMOTIDINE 40 MG/1
40 TABLET, FILM COATED ORAL DAILY
Qty: 90 TABLET | Refills: 3 | Status: SHIPPED | OUTPATIENT
Start: 2025-01-29

## 2025-01-29 NOTE — ASSESSMENT & PLAN NOTE
Orders:    Daridorexant HCl (Quviviq) 50 MG tablet; Take 1 tablet by mouth Every Night.  Patient has been on Quviviq and doing well for this.  UDS repeated today.  Will need to see every 3 months for this.  PDMP reviewed and appropriate

## 2025-01-29 NOTE — ASSESSMENT & PLAN NOTE
Orders:    omeprazole (priLOSEC) 40 MG capsule; Take 1 capsule by mouth Daily.    famotidine (PEPCID) 40 MG tablet; Take 1 tablet by mouth Daily.  Has struggled with reflux since bariatric surgery and has been on both a PPI and H2 blocker and we will continue this for her.

## 2025-01-29 NOTE — PROGRESS NOTES
Subjective   The ABCs of the Annual Wellness Visit  Medicare Wellness Visit      Skye De Oliveira is a 74 y.o. patient who presents for a Medicare Wellness Visit.    The following portions of the patient's history were reviewed and   updated as appropriate: allergies, current medications, past family history, past medical history, past social history, past surgical history, and problem list.    Compared to one year ago, the patient's physical   health is the same.  Compared to one year ago, the patient's mental   health is the same.    Recent Hospitalizations:  She was not admitted to the hospital during the last year.     Current Medical Providers:  Patient Care Team:  Lydia Wilson DO as PCP - General (Family Medicine)  William Quan DO as Consulting Physician (Rheumatology)  Jimmie Hewitt APRN as Nurse Practitioner (Rheumatology)  Kaushik Carr APRN as Nurse Practitioner (Rheumatology)  Herrera Suarez MD as Surgeon (Orthopedic Surgery)  Elliot De Jesus MD as Consulting Physician (Dermatology)    Outpatient Medications Prior to Visit   Medication Sig Dispense Refill    Allergy Relief 180 MG tablet Take 1 tablet by mouth Daily.      Cholecalciferol (Natural Vitamin D-3) 125 MCG (5000 UT) tablet Take 1 tablet by mouth Every Evening. 180 tablet 0    Daridorexant HCl (Quviviq) 50 MG tablet Take 1 tablet by mouth Every Night. 90 tablet 1    famotidine (PEPCID) 40 MG tablet TAKE 1 TABLET DAILY 90 tablet 3    melatonin 5 MG tablet tablet Take 1 tablet by mouth At Night As Needed.      multivitamin (THERAGRAN) tablet tablet Take 1 tablet by mouth Daily.      omeprazole (priLOSEC) 40 MG capsule TAKE 1 CAPSULE DAILY BEFORE A MEAL 90 capsule 3    sertraline (ZOLOFT) 100 MG tablet TAKE 1 TABLET DAILY 90 tablet 3    traMADol (ULTRAM) 50 MG tablet Take 1 tablet by mouth 4 (Four) Times a Day As Needed for Moderate Pain. 120 tablet 5    valsartan (Diovan) 80 MG tablet Take 1 tablet by mouth  Daily. 30 tablet 0    Vitamin D, Ergocalciferol, 49357 units capsule Take 1 capsule by mouth 1 (One) Time Per Week. 7 capsule 0     No facility-administered medications prior to visit.     Opioid medication/s are on active medication list.  and I have evaluated her active treatment plan and pain score trends (see table).  Vitals:    01/29/25 0823   PainSc:   4   PainLoc: Foot  Comment: RIGHT     I have reviewed the chart for potential of high risk medication and harmful drug interactions in the elderly.        Aspirin is not on active medication list.  Aspirin use is contraindicated for this patient due to: surgical hx.  .    Patient Active Problem List   Diagnosis    Anxiety    Depression    Osteoporosis    S/P bariatric surgery    Anosmia    Bursitis of hip    Chronic kidney disease, stage 2 (mild)    Closed fracture of right distal radius    Closed nondisplaced fracture of right talus    Gastroesophageal reflux disease without esophagitis    H/O gastric bypass    Encounter for long-term (current) use of other medications    History of adenomatous polyp of colon    Hypokalemia    Memory deficits    Primary insomnia    Primary osteoarthritis involving multiple joints    Recurrent falls    Senile osteoporosis    Tear of gluteus medius tendon    Thyroid nodule    Vitamin D deficiency    Vitamin B12 deficiency    Bilateral hand pain    Anxiety    Chest pain    Costochondral chest pain    Depression    Osteoporosis    S/P bariatric surgery    DJD (degenerative joint disease)    Right upper quadrant abdominal pain    Armpit pain, left    Unintentional weight loss    Pre-operative clearance    Acute pharyngitis    Dysfunction of both eustachian tubes    Flushing reaction    Laryngitis    Capsular contracture of breast implant    Injury of left leg    Encounter for medication monitoring    Left ankle pain    Acute nasopharyngitis    Primary hypertension     Advance Care Planning Advance Directive is not on file.  ACP  "discussion was held with the patient during this visit. Patient has an advance directive (not in EMR), copy requested.            Objective   Vitals:    25 0823   BP: 144/92   BP Location: Left arm   Patient Position: Sitting   Cuff Size: Adult   Pulse: 76   Resp: 16   Temp: 97.5 °F (36.4 °C)   TempSrc: Temporal   SpO2: 95%   Weight: 58.2 kg (128 lb 3.2 oz)   Height: 152.4 cm (60\")   PainSc:   4   PainLoc: Foot  Comment: RIGHT       Estimated body mass index is 25.04 kg/m² as calculated from the following:    Height as of this encounter: 152.4 cm (60\").    Weight as of this encounter: 58.2 kg (128 lb 3.2 oz).                Does the patient have evidence of cognitive impairment? No                                                                                                Health  Risk Assessment    Smoking Status:  Social History     Tobacco Use   Smoking Status Never   Smokeless Tobacco Never     Alcohol Consumption:  Social History     Substance and Sexual Activity   Alcohol Use Not Currently       Fall Risk Screen  STEADI Fall Risk Assessment was completed, and patient is at HIGH risk for falls. Assessment completed on:2025    Depression Screening   Little interest or pleasure in doing things? Not at all   Feeling down, depressed, or hopeless? Not at all   PHQ-2 Total Score 0      Health Habits and Functional and Cognitive Screenin/29/2025     8:00 AM   Functional & Cognitive Status   Do you have difficulty preparing food and eating? No   Do you have difficulty bathing yourself, getting dressed or grooming yourself? No   Do you have difficulty using the toilet? No   Do you have difficulty moving around from place to place? No   Do you have trouble with steps or getting out of a bed or a chair? No   Current Diet Well Balanced Diet   Dental Exam Up to date   Eye Exam Up to date   Exercise (times per week) 3 times per week   Current Exercises Include Other        Exercise Comment yoga, golf "   Do you need help using the phone?  No   Are you deaf or do you have serious difficulty hearing?  No   Do you need help to go to places out of walking distance? No   Do you need help shopping? No   Do you need help preparing meals?  No   Do you need help with housework?  No   Do you need help with laundry? No   Do you need help taking your medications? No   Do you need help managing money? No   Do you ever drive or ride in a car without wearing a seat belt? No   Have you felt unusual stress, anger or loneliness in the last month? No   Who do you live with? Spouse   If you need help, do you have trouble finding someone available to you? No   Have you been bothered in the last four weeks by sexual problems? No   Do you have difficulty concentrating, remembering or making decisions? Yes           Age-appropriate Screening Schedule:  Refer to the list below for future screening recommendations based on patient's age, sex and/or medical conditions. Orders for these recommended tests are listed in the plan section. The patient has been provided with a written plan.    Health Maintenance List  Health Maintenance   Topic Date Due    DXA SCAN  11/01/2023    COVID-19 Vaccine (6 - 2024-25 season) 01/31/2025 (Originally 9/1/2024)    TDAP/TD VACCINES (2 - Td or Tdap) 07/09/2025    BMI FOLLOWUP  10/14/2025    ANNUAL WELLNESS VISIT  01/29/2026    MAMMOGRAM  08/09/2026    COLORECTAL CANCER SCREENING  12/22/2031    HEPATITIS C SCREENING  Completed    INFLUENZA VACCINE  Completed    Pneumococcal Vaccine 65+  Completed    ZOSTER VACCINE  Completed                                                                                                                                                CMS Preventative Services Quick Reference  Risk Factors Identified During Encounter  Fall Risk-High or Moderate: Sit to Stand Exercise Information Shared in After Visit Summary and Referral Placed for Physical Therapy  Immunizations  "Discussed/Encouraged: RSV (Respiratory Syncytial Virus)    The above risks/problems have been discussed with the patient.  Pertinent information has been shared with the patient in the After Visit Summary.  An After Visit Summary and PPPS were made available to the patient.    Follow Up:   Next Medicare Wellness visit to be scheduled in 1 year.         Additional E&M Note during same encounter follows:  Patient has additional, significant, and separately identifiable condition(s)/problem(s) that require work above and beyond the Medicare Wellness Visit     Chief Complaint  Establish Care (Concerns about bone density and vitamin D absorption due to gastric bypass in 2014 ) and Hypertension    Subjective   HPI  Skye is also being seen today for additional medical problem/s.      Vitamin D deficiency  -does take supplement  -due for recheck    B12 deficiency  -used to do injections, no longer doing this    HTN  -was running 200 SBP prior to starting valsartan   -BP upon waking is 120s SBP, takes meds in the am  -BP running ~160 SBP in the evening    Insomnia  -does well with quiviviq without side effects              Objective   Vital Signs:  /92 (BP Location: Left arm, Patient Position: Sitting, Cuff Size: Adult)   Pulse 76   Temp 97.5 °F (36.4 °C) (Temporal)   Resp 16   Ht 152.4 cm (60\")   Wt 58.2 kg (128 lb 3.2 oz)   SpO2 95%   BMI 25.04 kg/m²   Physical Exam  Constitutional:       General: She is not in acute distress.     Appearance: Normal appearance.   HENT:      Head: Normocephalic and atraumatic.   Eyes:      Conjunctiva/sclera: Conjunctivae normal.   Cardiovascular:      Rate and Rhythm: Normal rate and regular rhythm.      Pulses: Normal pulses.   Pulmonary:      Effort: Pulmonary effort is normal.      Breath sounds: Normal breath sounds.   Neurological:      Mental Status: She is alert.   Psychiatric:         Mood and Affect: Mood normal.         Behavior: Behavior normal.         Thought " Content: Thought content normal.                 Assessment and Plan            Vitamin D deficiency    Orders:    Vitamin D,25-Hydroxy; Future    Vitamin D,25-Hydroxy    Gastroesophageal reflux disease without esophagitis         Vitamin B12 deficiency    Orders:    Vitamin B12; Future    Vitamin B12  Will recheck B12 level today.  If deficient may need to restart injections  Menopause    Orders:    DEXA Bone Density Axial; Future  Will get a bone density scan to screen for osteoporosis.  At high risk for falls    Orders:    Ambulatory Referral to Physical Therapy  Patient has had multiple falls in the last year.  Tends to struggle with balance.  Will refer to physical therapy  Medicare annual wellness visit, subsequent         Medication monitoring encounter    Orders:    POC Urine Drug Screen, Triage    Stress fracture of right tibia, initial encounter    Orders:    Vitamin D, Ergocalciferol, 86546 units capsule; Take 1 capsule by mouth 1 (One) Time Per Week.    Anxiety    Orders:    sertraline (ZOLOFT) 100 MG tablet; Take 1 tablet by mouth Daily.  Anxiety is well-controlled with sertraline, continue current regimen.  Follow-up in 6 months  Primary insomnia    Orders:    Daridorexant HCl (Quviviq) 50 MG tablet; Take 1 tablet by mouth Every Night.  Patient has been on Quviviq and doing well for this.  UDS repeated today.  Will need to see every 3 months for this.  PDMP reviewed and appropriate  S/P bariatric surgery    Orders:    omeprazole (priLOSEC) 40 MG capsule; Take 1 capsule by mouth Daily.    famotidine (PEPCID) 40 MG tablet; Take 1 tablet by mouth Daily.  Has struggled with reflux since bariatric surgery and has been on both a PPI and H2 blocker and we will continue this for her.  Primary hypertension    Blood pressure is above goal of less than 140/90 here today.  Has been running at goal for her at home in the mornings but elevating in the afternoon and evening.  Will try switching her valsartan to  nighttime dosing and she will monitor this for the next week and then follow-up.  If still running high will consider adding a second agent or increasing dose of valsartan.               Follow Up   Return in about 1 week (around 2/5/2025) for Video visit.  Patient was given instructions and counseling regarding her condition or for health maintenance advice. Please see specific information pulled into the AVS if appropriate.

## 2025-01-29 NOTE — PATIENT INSTRUCTIONS
Advance Care Planning and Advance Directives     You make decisions on a daily basis - decisions about where you want to live, your career, your home, your life. Perhaps one of the most important decisions you face is your choice for future medical care. Take time to talk with your family and your healthcare team and start planning today.  Advance Care Planning is a process that can help you:  Understand possible future healthcare decisions in light of your own experiences  Reflect on those decision in light of your goals and values  Discuss your decisions with those closest to you and the healthcare professionals that care for you  Make a plan by creating a document that reflects your wishes    Surrogate Decision Maker  In the event of a medical emergency, which has left you unable to communicate or to make your own decisions, you would need someone to make decisions for you.  It is important to discuss your preferences for medical treatment with this person while you are in good health.     Qualities of a surrogate decision maker:  Willing to take on this role and responsibility  Knows what you want for future medical care  Willing to follow your wishes even if they don't agree with them  Able to make difficult medical decisions under stressful circumstances    Advance Directives  These are legal documents you can create that will guide your healthcare team and decision maker(s) when needed. These documents can be stored in the electronic medical record.    Living Will - a legal document to guide your care if you have a terminal condition or a serious illness and are unable to communicate. States vary by statute in document names/types, but most forms may include one or more of the following:        -  Directions regarding life-prolonging treatments        -  Directions regarding artificially provided nutrition/hydration        -  Choosing a healthcare decision maker        -  Direction regarding organ/tissue  donation    Durable Power of  for Healthcare - this document names an -in-fact to make medical decisions for you, but it may also allow this person to make personal and financial decisions for you. Please seek the advice of an  if you need this type of document.    **Advance Directives are not required and no one may discriminate against you if you do not sign one.    Medical Orders  Many states allow specific forms/orders signed by your physician to record your wishes for medical treatment in your current state of health. This form, signed in personal communication with your physician, addresses resuscitation and other medical interventions that you may or may not want.      For more information or to schedule a time with a [facility] Advance Care Planning Facilitator contact: [facility] or call [phone] and someone will contact you directly.  Fall Prevention in the Home, Adult  Falls can cause injuries and affect people of all ages. There are many simple things that you can do to make your home safe and to help prevent falls.  If you need it, ask for help making these changes.  What actions can I take to prevent falls?  General information  Use good lighting in all rooms. Make sure to:  Replace any light bulbs that burn out.  Turn on lights if it is dark and use night-lights.  Keep items that you use often in easy-to-reach places. Lower the shelves around your home if needed.  Move furniture so that there are clear paths around it.  Do not keep throw rugs or other things on the floor that can make you trip.  If any of your floors are uneven, fix them.  Add color or contrast paint or tape to clearly costa and help you see:  Grab bars or handrails.  First and last steps of staircases.  Where the edge of each step is.  If you use a ladder or stepladder:  Make sure that it is fully opened. Do not climb a closed ladder.  Make sure the sides of the ladder are locked in  place.  Have someone hold the ladder while you use it.  Know where your pets are as you move through your home.  What can I do in the bathroom?         Keep the floor dry. Clean up any water that is on the floor right away.  Remove soap buildup in the bathtub or shower. Buildup makes bathtubs and showers slippery.  Use non-skid mats or decals on the floor of the bathtub or shower.  Attach bath mats securely with double-sided, non-slip rug tape.  If you need to sit down while you are in the shower, use a non-slip stool.  Install grab bars by the toilet and in the bathtub and shower. Do not use towel bars as grab bars.  What can I do in the bedroom?  Make sure that you have a light by your bed that is easy to reach.  Do not use any sheets or blankets on your bed that hang to the floor.  Have a firm bench or chair with side arms that you can use for support when you get dressed.  What can I do in the kitchen?  Clean up any spills right away.  If you need to reach something above you, use a sturdy step stool that has a grab bar.  Keep electrical cables out of the way.  Do not use floor polish or wax that makes floors slippery.  What can I do with my stairs?  Do not leave anything on the stairs.  Make sure that you have a light switch at the top and the bottom of the stairs. Have them installed if you do not have them.  Make sure that there are handrails on both sides of the stairs. Fix handrails that are broken or loose. Make sure that handrails are as long as the staircases.  Install non-slip stair treads on all stairs in your home if they do not have carpet.  Avoid having throw rugs at the top or bottom of stairs, or secure the rugs with carpet tape to prevent them from moving.  Choose a carpet design that does not hide the edge of steps on the stairs. Make sure that carpet is firmly attached to the stairs. Fix any carpet that is loose or worn.  What can I do on the outside of my home?  Use bright outdoor  lighting.  Repair the edges of walkways and driveways and fix any cracks. Clear paths of anything that can make you trip, such as tools or rocks.  Add color or contrast paint or tape to clearly costa and help you see high doorway thresholds.  Trim any bushes or trees on the main path into your home.  Check that handrails are securely fastened and in good repair. Both sides of all steps should have handrails.  Install guardrails along the edges of any raised decks or porches.  Have leaves, snow, and ice cleared regularly. Use sand, salt, or ice melt on walkways during winter months if you live where there is ice and snow.  In the garage, clean up any spills right away, including grease or oil spills.  What other actions can I take?  Review your medicines with your health care provider. Some medicines can make you confused or feel dizzy. This can increase your chance of falling.  Wear closed-toe shoes that fit well and support your feet. Wear shoes that have rubber soles and low heels.  Use a cane, walker, scooter, or crutches that help you move around if needed.  Talk with your provider about other ways that you can decrease your risk of falls. This may include seeing a physical therapist to learn to do exercises to improve movement and strength.  Where to find more information  Centers for Disease Control and PreventionJUAN: cdc.gov  National [place] on Aging: logan.nih.gov  National [place] on Aging: logan.nih.gov  Contact a health care provider if:  You are afraid of falling at home.  You feel weak, drowsy, or dizzy at home.  You fall at home.  Get help right away if you:  Lose consciousness or have trouble moving after a fall.  Have a fall that causes a head injury.  These symptoms may be an emergency. Get help right away. Call 911.  Do not wait to see if the symptoms will go away.  Do not drive yourself to the hospital.  This information is not intended to replace advice given to you by your health care  provider. Make sure you discuss any questions you have with your health care provider.  Document Revised: 08/21/2023 Document Reviewed: 08/21/2023  Elsevier Patient Education © 2024 [employer]  Sit-to-Stand Exercise    The sit-to-stand exercise (also known as the chair stand or chair rise exercise) strengthens your lower body and helps you maintain or improve your mobility and independence. The end goal is to do the sit-to-stand exercise without using your hands. This will be easier as you become stronger. You should always talk with your health care provider before starting any exercise program, especially if you have had recent surgery.  Do the exercise exactly as told by your health care provider and adjust it as directed. It is normal to feel mild stretching, pulling, tightness, or discomfort as you do this exercise, but you should stop right away if you feel sudden pain or your pain gets worse. Do not begin doing this exercise until told by your health care provider.  What the sit-to-stand exercise does  The sit-to-stand exercise helps to strengthen the muscles in your thighs and the muscles in the center of your body that give you stability (core muscles). This exercise is especially helpful if:  You have had knee or hip surgery.  You have trouble getting up from a chair, out of a car, or off the toilet due to muscle weakness.  How to do the sit-to-stand exercise  Sit toward the front edge of a sturdy chair without armrests. Your knees should be bent and your feet should be flat on the floor and shoulder-width apart and underneath your hips.  Place your hands lightly on each side of the seat. Keep your back and neck as straight as possible, with your chest slightly forward.  Breathe in slowly. Lean forward and slightly shift your weight to the front of your feet.  Breathe out as you slowly stand up. Try not to support any weight with your hands.  Stand and pause for a full breath in and out.  Breathe in as  you sit down slowly. Tighten your core and abdominal muscles to control your lowering as much as possible. You should lower yourself back to the chair slowly, not just drop back into the seat.  Breathe out slowly.  Do this exercise 10-15 times. If needed, do it fewer times until you build up strength.  Rest for 1 minute, then do another set of 10-15 repetitions.  To change the difficulty of the sit-to-stand exercise  If the exercise is too difficult, use a chair with sturdy armrests, and push off the armrests to help you come to the standing position. You can also use the armrests to help slowly lower yourself back to sitting. As this gets easier, try to use your arms less. You can also place a firm cushion or pillow on the chair to make the surface higher.  If this exercise is too easy, do not use your arms to help raise or lower yourself. You can also wear a weighted vest, use hand weights, increase your repetitions, or try a lower chair.  General tips  You may feel tired when starting an exercise routine. This is normal.  You may have muscle soreness that lasts a few days. This is normal. As you get stronger, you may not feel muscle soreness.  Use smooth, steady movements.  Do not  hold your breath during strength exercises. This can cause unsafe changes in your blood pressure.  Breathe in slowly through your nose, and breathe out slowly through your mouth.  Summary  Strengthening your lower body is an important step to help you move safely and independently.  The sit-to-stand exercise helps strengthen the muscles in your thighs and core.  You should always talk with your health care provider before starting any exercise program, especially if you have had recent surgery.  This information is not intended to replace advice given to you by your health care provider. Make sure you discuss any questions you have with your health care provider.  Document Revised: 04/10/2022 Document Reviewed: 04/10/2022  [place]  Patient Education 2024 Elsevier Inc.    Medicare Wellness  Personal Prevention Plan of Service     Date of Office Visit:    Encounter Provider:  Lydia Wilson DO  Place of Service:  [facility] PR[facility]  Patient Name: Skye De Oliveira  :  1950    As part of the Medicare Wellness portion of your visit today, we are providing you with this personalized preventive plan of services (PPPS). This plan is based upon recommendations of the [place] Preventive Services Task Force (USPSTF) and the Advisory Committee on Immunization Practices (ACIP).    This lists the preventive care services that should be considered, and provides dates of when you are due. Items listed as completed are up-to-date and do not require any further intervention.    Health Maintenance   Topic Date Due   • DXA SCAN  2023   • COVID-19 Vaccine ( season) 2025 (Originally 2024)   • TDAP/TD VACCINES (2 - Td or Tdap) 2025   • BMI FOLLOWUP  10/14/2025   • ANNUAL WELLNESS VISIT  2026   • MAMMOGRAM  2026   • COLORECTAL CANCER SCREENING  2031   • HEPATITIS C SCREENING  Completed   • INFLUENZA VACCINE  Completed   • Pneumococcal Vaccine 65+  Completed   • ZOSTER VACCINE  Completed       Orders Placed This Encounter   Procedures   • DEXA Bone Density Axial     Standing Status:   Future     Standing Expiration Date:   2026     Order Specific Question:   Is patient taking or have taken long term Glucocorticoid (steroids)?     Answer:   No     Order Specific Question:   Does the patient have rheumatoid arthritis?     Answer:   No     Order Specific Question:   Does the patient have secondary osteoporosis?     Answer:   No     Order Specific Question:   Reason for Exam:     Answer:   menopause     Order Specific Question:   Release to patient     Answer:   Routine Release [[id number]]   • Vitamin D,25-Hydroxy     Standing Status:   Future     Standing Expiration Date:    1/29/2026     Order Specific Question:   Release to patient     Answer:   Routine Release [[id number]]   • Vitamin B12     Standing Status:   Future     Standing Expiration Date:   1/29/2026     Order Specific Question:   Release to patient     Answer:   Routine Release [[id number]]   • Ambulatory Referral to Physical Therapy     Referral Priority:   Routine     Referral Type:   Physical Therapy     Referral Reason:   Specialty Services Required     Requested Specialty:   Physical Therapy     Number of Visits Requested:   1       Return in about 1 week (around 2/5/2025) for Video visit.          Fall Prevention in the Home, Adult  Falls can cause injuries and affect people of all ages. There are many simple things that you can do to make your home safe and to help prevent falls.  If you need it, ask for help making these changes.  What actions can I take to prevent falls?  General information  Use good lighting in all rooms. Make sure to:  Replace any light bulbs that burn out.  Turn on lights if it is dark and use night-lights.  Keep items that you use often in easy-to-reach places. Lower the shelves around your home if needed.  Move furniture so that there are clear paths around it.  Do not keep throw rugs or other things on the floor that can make you trip.  If any of your floors are uneven, fix them.  Add color or contrast paint or tape to clearly costa and help you see:  Grab bars or handrails.  First and last steps of staircases.  Where the edge of each step is.  If you use a ladder or stepladder:  Make sure that it is fully opened. Do not climb a closed ladder.  Make sure the sides of the ladder are locked in place.  Have someone hold the ladder while you use it.  Know where your pets are as you move through your home.  What can I do in the bathroom?         Keep the floor dry. Clean up any water that is on the floor right away.  Remove soap buildup in the bathtub or shower. Buildup makes bathtubs and showers  slippery.  Use non-skid mats or decals on the floor of the bathtub or shower.  Attach bath mats securely with double-sided, non-slip rug tape.  If you need to sit down while you are in the shower, use a non-slip stool.  Install grab bars by the toilet and in the bathtub and shower. Do not use towel bars as grab bars.  What can I do in the bedroom?  Make sure that you have a light by your bed that is easy to reach.  Do not use any sheets or blankets on your bed that hang to the floor.  Have a firm bench or chair with side arms that you can use for support when you get dressed.  What can I do in the kitchen?  Clean up any spills right away.  If you need to reach something above you, use a sturdy step stool that has a grab bar.  Keep electrical cables out of the way.  Do not use floor polish or wax that makes floors slippery.  What can I do with my stairs?  Do not leave anything on the stairs.  Make sure that you have a light switch at the top and the bottom of the stairs. Have them installed if you do not have them.  Make sure that there are handrails on both sides of the stairs. Fix handrails that are broken or loose. Make sure that handrails are as long as the staircases.  Install non-slip stair treads on all stairs in your home if they do not have carpet.  Avoid having throw rugs at the top or bottom of stairs, or secure the rugs with carpet tape to prevent them from moving.  Choose a carpet design that does not hide the edge of steps on the stairs. Make sure that carpet is firmly attached to the stairs. Fix any carpet that is loose or worn.  What can I do on the outside of my home?  Use bright outdoor lighting.  Repair the edges of walkways and driveways and fix any cracks. Clear paths of anything that can make you trip, such as tools or rocks.  Add color or contrast paint or tape to clearly costa and help you see high doorway thresholds.  Trim any bushes or trees on the main path into your home.  Check that  handrails are securely fastened and in good repair. Both sides of all steps should have handrails.  Install guardrails along the edges of any raised decks or porches.  Have leaves, snow, and ice cleared regularly. Use sand, salt, or ice melt on walkways during winter months if you live where there is ice and snow.  In the garage, clean up any spills right away, including grease or oil spills.  What other actions can I take?  Review your medicines with your health care provider. Some medicines can make you confused or feel dizzy. This can increase your chance of falling.  Wear closed-toe shoes that fit well and support your feet. Wear shoes that have rubber soles and low heels.  Use a cane, walker, scooter, or crutches that help you move around if needed.  Talk with your provider about other ways that you can decrease your risk of falls. This may include seeing a physical therapist to learn to do exercises to improve movement and strength.  Where to find more information  Centers for Disease Control and PreventionJUAN: cdc.gov  National [place] on Aging: [place]an.nih.gov  National [place] on Aging: [place]an.nih.gov  Contact a health care provider if:  You are afraid of falling at home.  You feel weak, drowsy, or dizzy at home.  You fall at home.  Get help right away if you:  Lose consciousness or have trouble moving after a fall.  Have a fall that causes a head injury.  These symptoms may be an emergency. Get help right away. Call 911.  Do not wait to see if the symptoms will go away.  Do not drive yourself to the hospital.  This information is not intended to replace advice given to you by your health care provider. Make sure you discuss any questions you have with your health care provider.  Document Revised: 08/21/2023 Document Reviewed: 08/21/2023  Elsevier Patient Education © 2024 [employer] Inc.  Sit-to-Stand Exercise    The sit-to-stand exercise (also known as the chair stand or chair rise exercise) strengthens  your lower body and helps you maintain or improve your mobility and independence. The end goal is to do the sit-to-stand exercise without using your hands. This will be easier as you become stronger. You should always talk with your health care provider before starting any exercise program, especially if you have had recent surgery.  Do the exercise exactly as told by your health care provider and adjust it as directed. It is normal to feel mild stretching, pulling, tightness, or discomfort as you do this exercise, but you should stop right away if you feel sudden pain or your pain gets worse. Do not begin doing this exercise until told by your health care provider.  What the sit-to-stand exercise does  The sit-to-stand exercise helps to strengthen the muscles in your thighs and the muscles in the center of your body that give you stability (core muscles). This exercise is especially helpful if:  You have had knee or hip surgery.  You have trouble getting up from a chair, out of a car, or off the toilet due to muscle weakness.  How to do the sit-to-stand exercise  Sit toward the front edge of a sturdy chair without armrests. Your knees should be bent and your feet should be flat on the floor and shoulder-width apart and underneath your hips.  Place your hands lightly on each side of the seat. Keep your back and neck as straight as possible, with your chest slightly forward.  Breathe in slowly. Lean forward and slightly shift your weight to the front of your feet.  Breathe out as you slowly stand up. Try not to support any weight with your hands.  Stand and pause for a full breath in and out.  Breathe in as you sit down slowly. Tighten your core and abdominal muscles to control your lowering as much as possible. You should lower yourself back to the chair slowly, not just drop back into the seat.  Breathe out slowly.  Do this exercise 10-15 times. If needed, do it fewer times until you build up strength.  Rest for 1  minute, then do another set of 10-15 repetitions.  To change the difficulty of the sit-to-stand exercise  If the exercise is too difficult, use a chair with sturdy armrests, and push off the armrests to help you come to the standing position. You can also use the armrests to help slowly lower yourself back to sitting. As this gets easier, try to use your arms less. You can also place a firm cushion or pillow on the chair to make the surface higher.  If this exercise is too easy, do not use your arms to help raise or lower yourself. You can also wear a weighted vest, use hand weights, increase your repetitions, or try a lower chair.  General tips  You may feel tired when starting an exercise routine. This is normal.  You may have muscle soreness that lasts a few days. This is normal. As you get stronger, you may not feel muscle soreness.  Use smooth, steady movements.  Do not  hold your breath during strength exercises. This can cause unsafe changes in your blood pressure.  Breathe in slowly through your nose, and breathe out slowly through your mouth.  Summary  Strengthening your lower body is an important step to help you move safely and independently.  The sit-to-stand exercise helps strengthen the muscles in your thighs and core.  You should always talk with your health care provider before starting any exercise program, especially if you have had recent surgery.  This information is not intended to replace advice given to you by your health care provider. Make sure you discuss any questions you have with your health care provider.  Document Revised: 04/10/2022 Document Reviewed: 04/10/2022  Elsevier Patient Education © 2024 [employer] Inc.

## 2025-01-29 NOTE — ASSESSMENT & PLAN NOTE
Blood pressure is above goal of less than 140/90 here today.  Has been running at goal for her at home in the mornings but elevating in the afternoon and evening.  Will try switching her valsartan to nighttime dosing and she will monitor this for the next week and then follow-up.  If still running high will consider adding a second agent or increasing dose of valsartan.

## 2025-01-29 NOTE — ASSESSMENT & PLAN NOTE
Orders:    Vitamin B12; Future    Vitamin B12  Will recheck B12 level today.  If deficient may need to restart injections

## 2025-01-29 NOTE — ASSESSMENT & PLAN NOTE
Orders:    sertraline (ZOLOFT) 100 MG tablet; Take 1 tablet by mouth Daily.  Anxiety is well-controlled with sertraline, continue current regimen.  Follow-up in 6 months

## 2025-01-30 LAB
25(OH)D3+25(OH)D2 SERPL-MCNC: 27.9 NG/ML (ref 30–100)
VIT B12 SERPL-MCNC: 176 PG/ML (ref 232–1245)

## 2025-02-04 ENCOUNTER — TELEPHONE (OUTPATIENT)
Dept: ORTHOPEDIC SURGERY | Facility: CLINIC | Age: 75
End: 2025-02-04
Payer: MEDICARE

## 2025-02-04 ENCOUNTER — TELEMEDICINE (OUTPATIENT)
Dept: FAMILY MEDICINE CLINIC | Facility: CLINIC | Age: 75
End: 2025-02-04
Payer: MEDICARE

## 2025-02-04 DIAGNOSIS — E55.9 VITAMIN D DEFICIENCY: ICD-10-CM

## 2025-02-04 DIAGNOSIS — I10 PRIMARY HYPERTENSION: Primary | ICD-10-CM

## 2025-02-04 DIAGNOSIS — E53.8 B12 DEFICIENCY: ICD-10-CM

## 2025-02-04 PROCEDURE — 99213 OFFICE O/P EST LOW 20 MIN: CPT | Performed by: STUDENT IN AN ORGANIZED HEALTH CARE EDUCATION/TRAINING PROGRAM

## 2025-02-04 PROCEDURE — 1160F RVW MEDS BY RX/DR IN RCRD: CPT | Performed by: STUDENT IN AN ORGANIZED HEALTH CARE EDUCATION/TRAINING PROGRAM

## 2025-02-04 PROCEDURE — 1159F MED LIST DOCD IN RCRD: CPT | Performed by: STUDENT IN AN ORGANIZED HEALTH CARE EDUCATION/TRAINING PROGRAM

## 2025-02-04 PROCEDURE — 1125F AMNT PAIN NOTED PAIN PRSNT: CPT | Performed by: STUDENT IN AN ORGANIZED HEALTH CARE EDUCATION/TRAINING PROGRAM

## 2025-02-04 RX ORDER — VALSARTAN 160 MG/1
160 TABLET ORAL DAILY
Qty: 90 TABLET | Refills: 1 | Status: SHIPPED | OUTPATIENT
Start: 2025-02-04

## 2025-02-04 RX ORDER — LANOLIN ALCOHOL/MO/W.PET/CERES
1000 CREAM (GRAM) TOPICAL DAILY
Qty: 90 TABLET | Refills: 1 | Status: SHIPPED | OUTPATIENT
Start: 2025-02-04

## 2025-02-04 NOTE — PROGRESS NOTES
"Chief Complaint  No chief complaint on file.    Subjective           Skye De Oliveira presents to [facility]  History of Present Illness    HTN  -At last appt we switched valsartan to nighttime dosing.  -115/74 after 2 doses  -ever since then 137-146/67-80 in the morning  -nighttime 140-158 SBP    Vitamin D deficiency  -found to be 27.9, has previously been normal  -just started weekly 50,000IU    B12 deficiency  -not taking supplements at this time      Objective   Vital Signs:   There were no vitals taken for this visit.    Estimated body mass index is 25.04 kg/m² as calculated from the following:    Height as of 1/29/25: 152.4 cm (60\").    Weight as of 1/29/25: 58.2 kg (128 lb 3.2 oz).     Physical Exam   Constitutional: She appears well-developed.   Pulmonary/Chest: Effort normal.   Neurological: She is alert.   Psychiatric: She has a normal mood and affect.     Result Review :            Assessment and Plan      Diagnoses and all orders for this visit:    1. Primary hypertension (Primary)  -     valsartan (DIOVAN) 160 MG tablet; Take 1 tablet by mouth Daily.  Dispense: 90 tablet; Refill: 1    2. Vitamin D deficiency  -     Vitamin D,25-Hydroxy; Future    3. B12 deficiency  -     vitamin B-12 (CYANOCOBALAMIN) 1000 MCG tablet; Take 1 tablet by mouth Daily.  Dispense: 90 tablet; Refill: 1  -     Vitamin B12; Future    For hypertension the day that she took 2 of her 80 mg tablets of valsartan her blood pressure was excellent.  We will increase her dose of valsartan to 160 mg daily.  Switching to taking the medication at nighttime did not cause a measurable decrease in her blood pressure.  Goal for her is less than 140/90.  Follow-up in 6 months.    Patient has a pretty significant vitamin D deficiency.  Continue the weekly supplementation and recheck in 3 months.    Patient also has B12 deficiency and we will start daily supplementation with plan to recheck in 3 months    Follow Up "     Return in about 6 months (around 8/4/2025) for Next scheduled follow up.  Patient was given instructions and counseling regarding her condition or for health maintenance advice. Please see specific information pulled into the AVS if appropriate.     Mode of Visit: Video  Location of patient: home  [place] [facility]  You have chosen to receive care through a telehealth visit.  The patient has signed the video visit consent form.  The visit included audio and video interaction. No technical issues occurred during this visit.

## 2025-02-04 NOTE — TELEPHONE ENCOUNTER
PT WOULD LIKE TO KNOW IF DR VAN CAN GIVE HER A TEMPORARY PARKING PLACARD FORM DUE TO BOOT/SCOOTER, WALKING LIMITATIONS WHILE SHE IS WORKING AT THE [facility]. PLEASE CONTACT HER TO LET HER KNOW.

## 2025-02-04 NOTE — TELEPHONE ENCOUNTER
Yes it is fine. I just saw this and they have already left but I will fill one out and have someone send over tomorrow.

## 2025-02-25 ENCOUNTER — OFFICE VISIT (OUTPATIENT)
Dept: ORTHOPEDIC SURGERY | Facility: CLINIC | Age: 75
End: 2025-02-25
Payer: MEDICARE

## 2025-02-25 VITALS — HEIGHT: 60 IN | BODY MASS INDEX: 25.13 KG/M2 | WEIGHT: 128 LBS

## 2025-02-25 DIAGNOSIS — M84.361A STRESS FRACTURE OF RIGHT TIBIA, INITIAL ENCOUNTER: Primary | ICD-10-CM

## 2025-02-25 NOTE — PROGRESS NOTES
[facility] [place] [facility] Follow Up Visit     Office Follow Up      Date: 02/25/2025   Patient Name: Skye De Oliveira  MRN: [id number]  YOB: 1950    Referring Physician: No ref. provider found     Chief Complaint:   Chief Complaint   Patient presents with    Follow-up     1 month follow up -- Right ankle pain     History of Present Illness: Skye De Oliveira is a 74 y.o. female who returns to clinic today for follow up on right ankle pain. Her pain is a 3 /10 on the pain scale. Patient has tried the following previous treatments bracing . She mentions current symptoms of pain  and swelling. She states that these treatments have Improved.    Subjective     Review of Systems: Review of Systems   Constitutional:  Negative for chills, fever, unexpected weight gain and unexpected weight loss.   HENT:  Negative for congestion, postnasal drip and rhinorrhea.    Eyes:  Negative for blurred vision.   Respiratory:  Negative for shortness of breath.    Cardiovascular:  Negative for leg swelling.   Gastrointestinal:  Negative for abdominal pain, nausea and vomiting.   Genitourinary:  Negative for difficulty urinating.   Musculoskeletal:  Positive for arthralgias. Negative for gait problem, joint swelling and myalgias.   Skin:  Negative for skin lesions and wound.   Neurological:  Negative for dizziness, weakness, light-headedness and numbness.   Hematological:  Does not bruise/bleed easily.   Psychiatric/Behavioral:  Negative for depressed mood.         Medications:   Current Outpatient Medications:     Allergy Relief 180 MG tablet, Take 1 tablet by mouth Daily., Disp: , Rfl:     Daridorexant HCl (Quviviq) 50 MG tablet, Take 1 tablet by mouth Every Night., Disp: 90 tablet, Rfl: 1    famotidine (PEPCID) 40 MG tablet, Take 1 tablet by mouth Daily., Disp: 90 tablet, Rfl: 3    melatonin 5 MG tablet tablet, Take 1 tablet by mouth At Night As Needed., Disp: , Rfl:      "multivitamin (THERAGRAN) tablet tablet, Take 1 tablet by mouth Daily., Disp: , Rfl:     omeprazole (priLOSEC) 40 MG capsule, Take 1 capsule by mouth Daily., Disp: 90 capsule, Rfl: 3    sertraline (ZOLOFT) 100 MG tablet, Take 1 tablet by mouth Daily., Disp: 90 tablet, Rfl: 3    traMADol (ULTRAM) 50 MG tablet, Take 1 tablet by mouth 4 (Four) Times a Day As Needed for Moderate Pain., Disp: 120 tablet, Rfl: 5    valsartan (DIOVAN) 160 MG tablet, Take 1 tablet by mouth Daily., Disp: 90 tablet, Rfl: 1    vitamin B-12 (CYANOCOBALAMIN) 1000 MCG tablet, Take 1 tablet by mouth Daily., Disp: 90 tablet, Rfl: 1    Vitamin D, Ergocalciferol, 58918 units capsule, Take 1 capsule by mouth 1 (One) Time Per Week., Disp: 12 capsule, Rfl: 3    Allergies:   Allergies   Allergen Reactions    Penicillins Hives     Tolerates cephalospins well         I have reviewed and updated the patient's chief complaint, history of present illness, review of systems, past medical history, surgical history, family history, social history, medications and allergy list as appropriate.     Objective      Vital Signs:   Vitals:    02/25/25 0829   Weight: 58.1 kg (128 lb)   Height: 152.4 cm (60\")     Body mass index is 25 kg/m².  BMI is >= 25 and <30. (Overweight) The following options were offered after discussion;: exercise counseling/recommendations and nutrition counseling/recommendations     Patient reports that she is a non-smoker and has not ever been a smoker.  This behavior was applauded and she was encouraged to continue in smoking cessation.  We will continue to monitor at subsequent visits.     Ortho Exam:  Cardiovascular System: Arterial Pulses Right: dorsalis pedis normal and posterior tibialis normal. Arterial Pulses Left: posterior tibialis normal. Edema Right: none. Edema Left: none. Varicosities Right: capillary refill test normal. Varicosities Left: capillary refill test normal.   Lower Legs: Inspection Right: no mass, induration, warmth, " erythema, swelling, or ecchymosis. Bony Palpation Right: no tenderness of the proximal tibia, the distal tibia, the proximal fibula, the mid fibula, or the distal fibula and tenderness of the mid tibia medial (anterior). Bony Palpation Left: no tenderness of the proximal tibia, the mid tibia, the distal tibia, the proximal fibula, the mid fibula, or the distal fibula. Soft Tissue Palpation Right: no tenderness of the lateral tibial muscle compartment, the posterior tibial muscle compartment, the gastrocnemius, or the achilles tendon and tenderness of the anterior tibial muscle compartment. Soft Tissue Palpation Left: no tenderness of the anterior tibial muscle compartment, the lateral tibial muscle compartment, the posterior tibial muscle compartment, the gastrocnemius, or the achilles tendon. Active Range of Motion Right: knee flexion normal and extension normal and ankle dorsiflexion normal, plantar flexion normal, inversion normal, and eversion normal. Active Range of Motion Left: knee flexion normal and extension normal and ankle dorsiflexion normal, plantar flexion normal, inversion normal, and eversion normal. Stability Right: anterior drawer negative. Stability Left: anterior drawer negative. Right Lower Leg Strength extensor hallucis longus 5/5 and digitorum longus 5/5; peroneus longus 5/5 and brevis 5/5; knee flexion 5/5 and extension 5/5; and lateral rotation of flexed leg 5/5, medial rotation of flexed leg 5/5, tibialis anterior 5/5, posterior tibialis 5/5, and gastrocnemius 5/5. Left Lower Leg Strength extensor hallucis longus 5/5 and digitorum longus 5/5; peroneus longus 5/5 and brevis 5/5; knee flexion 5/5 and extension 5/5; and lateral rotation of flexed leg 5/5, medial rotation of flexed leg 5/5, tibialis anterior 5/5, posterior tibialis 5/5, and gastrocnemius 5/5.   Skin: Right Lower Extremity: normal. Left Lower Extremity: normal.   Neurologic: Sensation on the Right: L2 normal, L3 normal, L4  normal, L5 normal, S1 normal, S2 normal, and S3,4,5 normal. Sensation on the Left: L2 normal, L3 normal, L4 normal, L5 normal, S1 normal, S2 normal, and S3,4,5 normal.    Results Review:   Imaging Results (Last 24 Hours)       ** No results found for the last 24 hours. **            Procedures    Assessment / Plan      Assessment/Plan:   Diagnoses and all orders for this visit:    1. Stress fracture of right tibia, initial encounter (Primary)  -     MRI Tibia Fibula Right Without Contrast; Future    Follow-up right anterior tibial stress fracture.  Completing vitamin D.  Has been using crutches, rolling scooter, and walking boot.  Still has tenderness and amount of swelling at the area on the anterior medial tibia.  Has not been able to bear weight outside of the walking boot.  We will MRI the right tib-fib to evaluate for stress fracture and rule out other causes of her pain.  Continue with the limited weightbearing using crutches rolling scooter and boot.  Finished vitamin D.    Follow Up:   No follow-ups on file.      Justin Suarez MD  [facility] [place] [facility]

## 2025-03-18 ENCOUNTER — HOSPITAL ENCOUNTER (OUTPATIENT)
Facility: HOSPITAL | Age: 75
Discharge: HOME | End: 2025-03-18
Admitting: STUDENT IN AN ORGANIZED HEALTH CARE EDUCATION/TRAINING PROGRAM
Payer: MEDICARE

## 2025-03-18 DIAGNOSIS — M84.361A STRESS FRACTURE OF RIGHT TIBIA, INITIAL ENCOUNTER: ICD-10-CM

## 2025-03-18 PROCEDURE — 73718 MRI LOWER EXTREMITY W/O DYE: CPT

## 2025-03-24 ENCOUNTER — OFFICE VISIT (OUTPATIENT)
Age: 75
End: 2025-03-24
Payer: MEDICARE

## 2025-03-24 VITALS
DIASTOLIC BLOOD PRESSURE: 80 MMHG | HEIGHT: 60 IN | SYSTOLIC BLOOD PRESSURE: 140 MMHG | WEIGHT: 131.2 LBS | BODY MASS INDEX: 25.76 KG/M2

## 2025-03-24 DIAGNOSIS — M84.361A STRESS FRACTURE OF RIGHT TIBIA, INITIAL ENCOUNTER: ICD-10-CM

## 2025-03-24 DIAGNOSIS — M84.361A STRESS FRACTURE OF RIGHT TIBIA, INITIAL ENCOUNTER: Primary | ICD-10-CM

## 2025-03-24 PROCEDURE — 99213 OFFICE O/P EST LOW 20 MIN: CPT | Performed by: STUDENT IN AN ORGANIZED HEALTH CARE EDUCATION/TRAINING PROGRAM

## 2025-03-24 PROCEDURE — 3077F SYST BP >= 140 MM HG: CPT | Performed by: STUDENT IN AN ORGANIZED HEALTH CARE EDUCATION/TRAINING PROGRAM

## 2025-03-24 PROCEDURE — 1160F RVW MEDS BY RX/DR IN RCRD: CPT | Performed by: STUDENT IN AN ORGANIZED HEALTH CARE EDUCATION/TRAINING PROGRAM

## 2025-03-24 PROCEDURE — 1159F MED LIST DOCD IN RCRD: CPT | Performed by: STUDENT IN AN ORGANIZED HEALTH CARE EDUCATION/TRAINING PROGRAM

## 2025-03-24 PROCEDURE — 3079F DIAST BP 80-89 MM HG: CPT | Performed by: STUDENT IN AN ORGANIZED HEALTH CARE EDUCATION/TRAINING PROGRAM

## 2025-03-24 RX ORDER — ERGOCALCIFEROL 1.25 MG/1
50000 CAPSULE, LIQUID FILLED ORAL WEEKLY
Qty: 7 CAPSULE | Refills: 0 | Status: SHIPPED | OUTPATIENT
Start: 2025-03-24

## 2025-03-24 NOTE — PROGRESS NOTES
[facility] [place] [facility] Follow Up Visit     Office Follow Up      Date: 03/24/2025   Patient Name: Skye De Oliveira  MRN: [id number]  YOB: 1950    Referring Physician: Lydia Wilson DO     Chief Complaint:   Chief Complaint   Patient presents with    Follow-up     1 month follow up- Stress fracture of right tibia,(MRI 3/18/25)     History of Present Illness: Skye De Oliveira is a 74 y.o. female who returns to clinic today for follow up on right tibia pain. She pain is a 0  /10 on the pain scale. Patient has tried the following previous treatments bracing  and anti-inflammatories. She mentions current symptoms of pain . She states that these treatments have stayed the same.    Subjective     Review of Systems: Review of Systems   Musculoskeletal:  Positive for arthralgias.   All other systems reviewed and are negative.       Medications:   Current Outpatient Medications:     Allergy Relief 180 MG tablet, Take 1 tablet by mouth Daily., Disp: , Rfl:     Daridorexant HCl (Quviviq) 50 MG tablet, Take 1 tablet by mouth Every Night., Disp: 90 tablet, Rfl: 1    famotidine (PEPCID) 40 MG tablet, Take 1 tablet by mouth Daily., Disp: 90 tablet, Rfl: 3    melatonin 5 MG tablet tablet, Take 1 tablet by mouth At Night As Needed., Disp: , Rfl:     multivitamin (THERAGRAN) tablet tablet, Take 1 tablet by mouth Daily., Disp: , Rfl:     omeprazole (priLOSEC) 40 MG capsule, Take 1 capsule by mouth Daily., Disp: 90 capsule, Rfl: 3    sertraline (ZOLOFT) 100 MG tablet, Take 1 tablet by mouth Daily., Disp: 90 tablet, Rfl: 3    traMADol (ULTRAM) 50 MG tablet, Take 1 tablet by mouth 4 (Four) Times a Day As Needed for Moderate Pain., Disp: 120 tablet, Rfl: 5    valsartan (DIOVAN) 160 MG tablet, Take 1 tablet by mouth Daily., Disp: 90 tablet, Rfl: 1    vitamin B-12 (CYANOCOBALAMIN) 1000 MCG tablet, Take 1 tablet by mouth Daily., Disp: 90 tablet, Rfl: 1    vitamin D  "(ERGOCALCIFEROL) 1.25 MG (99339 UT) capsule capsule, TAKE 1 CAPSULE BY MOUTH 1 TIME EVERY WEEK, Disp: 7 capsule, Rfl: 0    Allergies:   Allergies   Allergen Reactions    Penicillins Hives     Tolerates cephalospins well         I have reviewed and updated the patient's chief complaint, history of present illness, review of systems, past medical history, surgical history, family history, social history, medications and allergy list as appropriate.     Objective      Vital Signs:   Vitals:    03/24/25 1445   BP: 140/80   Weight: 59.5 kg (131 lb 3.2 oz)   Height: 152.4 cm (60\")     Body mass index is 25.62 kg/m².  BMI is >= 25 and <30. (Overweight) The following options were offered after discussion;: exercise counseling/recommendations and nutrition counseling/recommendations     Patient reports that she is a non-smoker and has not ever been a smoker.  This behavior was applauded and she was encouraged to continue in smoking cessation.  We will continue to monitor at subsequent visits.      Ortho Exam:  Cardiovascular System: Arterial Pulses Right: dorsalis pedis normal and posterior tibialis normal. Arterial Pulses Left: posterior tibialis normal. Edema Right: none. Edema Left: none. Varicosities Right: capillary refill test normal. Varicosities Left: capillary refill test normal.   Lower Legs: Inspection Right: no mass, induration, warmth, erythema, swelling, or ecchymosis. Bony Palpation Right: no tenderness of the proximal tibia, the distal tibia, the proximal fibula, the mid fibula, or the distal fibula and tenderness of the mid tibia medial (anterior). Bony Palpation Left: no tenderness of the proximal tibia, the mid tibia, the distal tibia, the proximal fibula, the mid fibula, or the distal fibula. Soft Tissue Palpation Right: no tenderness of the lateral tibial muscle compartment, the posterior tibial muscle compartment, the gastrocnemius, or the achilles tendon and tenderness of the anterior tibial muscle " compartment. Soft Tissue Palpation Left: no tenderness of the anterior tibial muscle compartment, the lateral tibial muscle compartment, the posterior tibial muscle compartment, the gastrocnemius, or the achilles tendon. Active Range of Motion Right: knee flexion normal and extension normal and ankle dorsiflexion normal, plantar flexion normal, inversion normal, and eversion normal. Active Range of Motion Left: knee flexion normal and extension normal and ankle dorsiflexion normal, plantar flexion normal, inversion normal, and eversion normal. Stability Right: anterior drawer negative. Stability Left: anterior drawer negative. Right Lower Leg Strength extensor hallucis longus 5/5 and digitorum longus 5/5; peroneus longus 5/5 and brevis 5/5; knee flexion 5/5 and extension 5/5; and lateral rotation of flexed leg 5/5, medial rotation of flexed leg 5/5, tibialis anterior 5/5, posterior tibialis 5/5, and gastrocnemius 5/5. Left Lower Leg Strength extensor hallucis longus 5/5 and digitorum longus 5/5; peroneus longus 5/5 and brevis 5/5; knee flexion 5/5 and extension 5/5; and lateral rotation of flexed leg 5/5, medial rotation of flexed leg 5/5, tibialis anterior 5/5, posterior tibialis 5/5, and gastrocnemius 5/5.   Skin: Right Lower Extremity: normal. Left Lower Extremity: normal.   Neurologic: Sensation on the Right: L2 normal, L3 normal, L4 normal, L5 normal, S1 normal, S2 normal, and S3,4,5 normal. Sensation on the Left: L2 normal, L3 normal, L4 normal, L5 normal, S1 normal, S2 normal, and S3,4,5 normal.    Results Review:   Imaging Results (Last 24 Hours)       ** No results found for the last 24 hours. **        MRI TIBIA FIBULA RIGHT WO CONTRAST     Date of Exam: 3/18/2025 10:37 AM EDT     Indication: anterior tibia stress fracture.     Comparison: None available.     Technique:  Routine multiplanar/multisequence images of the right tibia and fibula were obtained without contrast administration.           Findings:  There is a stress fracture of the mid-distal tibia. There is periosteal and robust intramedullary edema (series 7 image 18, series 5 image 13). There is some edema of the adjacent soft tissues anteriorly and posteriorly. There is a longitudinally   oriented fracture of the posterior cortex of the tibia in this region (series 6 image 20-30, series 3 image 14). Unremarkable appearance of the partially evaluated right knee and ankle joints. Unremarkable appearance of the left tibia.     IMPRESSION:  Impression:  Stress fracture of the right tibia.           Electronically Signed: Jose R Barakat MD    3/21/2025 1:26 PM EDT    Workstation ID: [id number]    Procedures    Assessment / Plan      Assessment/Plan:   Diagnoses and all orders for this visit:    1. Stress fracture of right tibia, initial encounter (Primary)      Plan:  MRI today confirms stress fracture to the right tibia involving the posterior cortex.  Edema also present in the soft tissues anteriorly.  Pain controlled at this time given that she has been nonweightbearing using tall boot, crutches.  Continue nonweightbearing status.  I recommend that she utilize a bone stimulator to help with healing given the length of her symptoms, prior treatment, and history of stress fractures.  Refill high-dose vitamin D for another 7 weeks.  Follow-up with me in 6 weeks.    Follow Up:   Return in about 6 weeks (around 5/5/2025) for Recheck.      Justin Suarez MD  [facility] [place] [facility]

## 2025-04-01 ENCOUNTER — TELEPHONE (OUTPATIENT)
Dept: ORTHOPEDIC SURGERY | Facility: CLINIC | Age: 75
End: 2025-04-01
Payer: MEDICARE

## 2025-04-01 NOTE — TELEPHONE ENCOUNTER
Called patient and let her know that Bernie was working on getting it approved and will call her with an update. She understood. She will call back with any further problems or questions.       Lydia

## 2025-04-01 NOTE — TELEPHONE ENCOUNTER
PT WOULD LIKE UPDATE ON BONE STIMULATOR  - WOULD LIKE CALLBACK WITH INFORMATION. VERIFIED PHONE NUMBER.

## 2025-04-17 ENCOUNTER — OFFICE VISIT (OUTPATIENT)
Dept: FAMILY MEDICINE CLINIC | Facility: CLINIC | Age: 75
End: 2025-04-17
Payer: MEDICARE

## 2025-04-17 VITALS
SYSTOLIC BLOOD PRESSURE: 120 MMHG | HEIGHT: 60 IN | BODY MASS INDEX: 25.82 KG/M2 | DIASTOLIC BLOOD PRESSURE: 74 MMHG | HEART RATE: 82 BPM | WEIGHT: 131.5 LBS | TEMPERATURE: 98.4 F | OXYGEN SATURATION: 98 % | RESPIRATION RATE: 16 BRPM

## 2025-04-17 DIAGNOSIS — M84.361A STRESS FRACTURE OF RIGHT TIBIA, INITIAL ENCOUNTER: Primary | ICD-10-CM

## 2025-04-17 DIAGNOSIS — E55.9 VITAMIN D DEFICIENCY: ICD-10-CM

## 2025-04-17 NOTE — PROGRESS NOTES
Office Visit      Date:  2025   Patient Name: Skye De Oliveira  : 1950   MRN: [id number]     Chief Complaint   Patient presents with    Hypertension       History of Present Illness: Skye De Oliveira is a 74 y.o. female who is here today for HTN f/u. She is also having issues with stress fractures. Dr. Wilson has been adjusting her medications. Continuing to have dizziness, diaphoresis, and hypotension 80's/60's when she checks her BP. Has had a total of 3-4 episodes since starting when she started blood pressure medication.     Fluid intake - 8 glasses of unsweet tea daily  Snapple - sugar free  C/o stress fx x 3 in the past 6 months  Gastric bypass hx  Has not had PT due to stress fx and boot    Answers submitted by the patient for this visit:  High Blood Pressure Questionnaire (Submitted on 4/10/2025)  Chief Complaint: Hypertension  Chronicity: recurrent  Onset: more than 1 month ago  Progression since onset: improved  Condition status: resistant  anxiety: No  blurred vision: No  chest pain: No  headaches: No  malaise/fatigue: Yes  orthopnea: No  palpitations: No  peripheral edema: No  shortness of breath: No  Agents associated with hypertension: no associated agents  CAD risks: no known risk factors  Compliance problems: no compliance problems  Additional Information: Very low in the morning and higher in the afternoon.i      Subjective      Past Medical History:   Diagnosis Date    Abnormal EKG 2022    Adenomatous polyp of colon     Age-related osteoporosis without current pathological fracture     Ankle sprain     Anxiety     Breast injury 2024    lateral right breast injury    Chronic kidney disease     STAGE 2    Costochondral chest pain     Depression     DJD (degenerative joint disease)     MULTIPLE JOINTS    GERD without esophagitis     H/O mammogram 2016    NLM    High risk medication use     History of COVID-19 2021    no hospital no steroids    Hypokalemia      Knee swelling     Major depression in remission     Osteopenia 2016    PONV (postoperative nausea and vomiting)     Primary hypertension 01/06/2025    Primary insomnia     Primary osteoarthritis involving multiple joints     S/P bariatric surgery     Stress fracture 6/24    Left leg    Thyroid nodule     MULTIPLE    Vitamin D deficiency         Past Surgical History:   Procedure Laterality Date    ABDOMINOPLASTY  2007    APPENDECTOMY      AUGMENTATION MAMMAPLASTY      BARIATRIC SURGERY  2014    Gastric Byass    BLADDER REPAIR  2011    [facility]    BREAST AUGMENTATION  2008    BREAST BIOPSY  2008    Both sides    BREAST SURGERY  2008    REMOVED IMPLANTS AND REMOVED MORE FIROIDS    CATARACT EXTRACTION Bilateral     CHOLECYSTECTOMY  2014    s/p lap elicia/ VLAD for partial SBO by Dr. Booker 2/17/14.    COLONOSCOPY  2017    ENDOSCOPY N/A 10/19/2022    Procedure: ESOPHAGOGASTRODUODENOSCOPY;  Surgeon: Ton Jurado MD;  Location:  [facility];  Service: General;  Laterality: N/A;    GASTRIC BYPASS  2014    with a 120 cm Minerva limb/ HHR by Dr. Shan Booker at [facility] 1/22/14    HAMMER TOE REPAIR      HIATAL HERNIA REPAIR N/A 10/19/2022    Procedure: HIATAL HERNIA REPAIR LAPAROSCOPIC;  Surgeon: Ton Jurado MD;  Location:  [facility];  Service: General;  Laterality: N/A;    HIP EXAMINATION UNDER ANESTHESIA Left     gluteus medius repair    HIP SURGERY  2022    Repair muscle    HYSTERECTOMY  1974    AND UILATERAL OOPHHORETOMY    KNEE SURGERY Left 2010    arthroscopy debridement    MASTOPEXY Bilateral 1982    WITH IMPLANT RECONSTRUCTION    NISSEN FUNDOPLICATION N/A 10/19/2022    Procedure: NISSEN FUNDOPLICATION LAPAROSCOPIC;  Surgeon: Ton Jurado MD;  Location:  [facility];  Service: General;  Laterality: N/A;    OVARY SURGERY Right 1998    REMOVED    REFRACTIVE SURGERY Bilateral     SHOULDER SURGERY Bilateral     ROTATOR CUFF  RIGHT 2000 LEFT 2001    TONSILLECTOMY   1974    ADENOIODECTOMY    VOCAL CORD BIOPSY  1992    POLYPS REMOVED       Family History   Problem Relation Age of Onset    Lung cancer Mother     Arthritis Mother     Cancer Mother         Lung cancer    Lung cancer Father 49    Cancer Father         Lung cancer    COPD Father     Mental illness Father         Schizophrenia    Diabetes Brother     Hearing loss Brother     Hypertension Brother     Cancer Brother         Prostrate cancer    Vision loss Brother         Macular Degeneration    Breast cancer Neg Hx     Ovarian cancer Neg Hx     Endometrial cancer Neg Hx        Social History     Socioeconomic History    Marital status:    Tobacco Use    Smoking status: Never     Passive exposure: Never    Smokeless tobacco: Never   Vaping Use    Vaping status: Never Used   Substance and Sexual Activity    Alcohol use: Not Currently    Drug use: Never    Sexual activity: Not Currently     Partners: Male     Birth control/protection: Hysterectomy     Comment: no hormones       Current Outpatient Medications   Medication Instructions    Allergy Relief 180 MG tablet 1 tablet, Daily    famotidine (PEPCID) 40 mg, Oral, Daily    melatonin 5 mg, Nightly PRN    multivitamin (THERAGRAN) tablet tablet 1 tablet, Daily    omeprazole (PRILOSEC) 40 mg, Oral, Daily    Quviviq 50 mg, Oral, Nightly    sertraline (ZOLOFT) 100 mg, Oral, Daily    traMADol (ULTRAM) 50 mg, Oral, 4 Times Daily PRN    valsartan (DIOVAN) 160 mg, Oral, Daily    vitamin B-12 (CYANOCOBALAMIN) 1,000 mcg, Oral, Daily    vitamin D (ERGOCALCIFEROL) 50,000 Units, Oral, Weekly       Allergies   Allergen Reactions    Penicillins Hives     Tolerates cephalospins well         Objective     Physical Exam  Vitals and nursing note reviewed.   Constitutional:       General: She is not in acute distress.  Eyes:      Pupils: Pupils are equal, round, and reactive to light.   Cardiovascular:      Rate and Rhythm: Normal rate and regular rhythm.   Pulmonary:      Effort:  "Pulmonary effort is normal.      Breath sounds: Normal breath sounds.   Musculoskeletal:         General: Normal range of motion.   Skin:     General: Skin is warm and dry.   Neurological:      Mental Status: She is alert and oriented to person, place, and time.   Psychiatric:         Mood and Affect: Mood normal.       Vitals:    04/17/25 1002   BP: 120/74   BP Location: Left arm   Patient Position: Sitting   Cuff Size: Adult   Pulse: 82   Resp: 16   Temp: 98.4 °F (36.9 °C)   TempSrc: Oral   SpO2: 98%   Weight: 59.6 kg (131 lb 8 oz)   Height: 152.4 cm (60\")   PainSc: 0-No pain     Body mass index is 25.68 kg/m².     No results found for this or any previous visit.     The ASCVD Risk score (Marva KAPLAN, et al., 2019) failed to calculate for the following reasons:    Cannot find a previous HDL lab    Cannot find a previous total cholesterol lab         Assessment / Plan         Stress fracture of right tibia, initial encounter  Patient concerned as she has had several stress fractures in the last several months  Most recent stress fracture-right tibia  She is concerned about bone density related to stress fractures  Labs today and patient to follow-up with Dr. Wilson  Patient is to continue seeing Dr. Suarez, orthopedics  No change in treatment regimen at this time    Orders:    Comprehensive Metabolic Panel    PTH, Intact; Future    Phosphorus; Future    Vitamin D deficiency    Orders:    Vitamin D,25-Hydroxy; Future       Patient Education:   Reviewed medications, potential side effects and signs and symptoms to report.   Discussed risk vs benefits of treatment plan with patient including medications, labs, and imaging.    Patient education materials attached to AVS and reviewed with patient during office visit today.   Addressed questions and concerns during visit. Patient verbalized understanding and agrees with tx plan.   Instructed to call the office with any questions and report to ER with any " life-threatening symptoms.    Return in about 4 weeks (around 5/15/2025) for unless symptoms do not improve or get worse.    HELEN Ruffin (Libby)  [facility]   [address] [id number] [phone]    NOTE TO PATIENT: The 21st Century Cures Act makes medical notes like these available to patients in the interest of transparency. However, be advised this is a medical document. It is intended as peer to peer communication. It is written in medical language and may contain abbreviations or verbiage that are unfamiliar. It may appear blunt or direct. Medical documents are intended to carry relevant information, facts as evident, and the clinical opinion of the practitioner.      EMR Dragon/Transcription disclaimer: Much of this encounter note is an electronic transcription of spoken language to printed text. Electronic transcription of spoken language may permit erroneous, or at times, nonsensical words or phrases to be inadvertently transcribed. Although I have reviewed the note for such errors, some may still exist.

## 2025-04-18 LAB
25(OH)D3+25(OH)D2 SERPL-MCNC: 53 NG/ML (ref 30–100)
ALBUMIN SERPL-MCNC: 4.3 G/DL (ref 3.8–4.8)
ALP SERPL-CCNC: 110 IU/L (ref 44–121)
ALT SERPL-CCNC: 22 IU/L (ref 0–32)
AST SERPL-CCNC: 23 IU/L (ref 0–40)
BILIRUB SERPL-MCNC: 0.2 MG/DL (ref 0–1.2)
BUN SERPL-MCNC: 13 MG/DL (ref 8–27)
BUN/CREAT SERPL: 17 (ref 12–28)
CALCIUM SERPL-MCNC: 9.5 MG/DL (ref 8.7–10.3)
CHLORIDE SERPL-SCNC: 98 MMOL/L (ref 96–106)
CO2 SERPL-SCNC: 24 MMOL/L (ref 20–29)
CREAT SERPL-MCNC: 0.76 MG/DL (ref 0.57–1)
EGFRCR SERPLBLD CKD-EPI 2021: 83 ML/MIN/1.73
GLOBULIN SER CALC-MCNC: 1.8 G/DL (ref 1.5–4.5)
GLUCOSE SERPL-MCNC: 111 MG/DL (ref 70–99)
PHOSPHATE SERPL-MCNC: 4.4 MG/DL (ref 3–4.3)
POTASSIUM SERPL-SCNC: 5.3 MMOL/L (ref 3.5–5.2)
PROT SERPL-MCNC: 6.1 G/DL (ref 6–8.5)
PTH-INTACT SERPL-MCNC: 23 PG/ML (ref 15–65)
SODIUM SERPL-SCNC: 139 MMOL/L (ref 134–144)

## 2025-04-25 ENCOUNTER — TELEPHONE (OUTPATIENT)
Dept: ORTHOPEDIC SURGERY | Facility: CLINIC | Age: 75
End: 2025-04-25
Payer: MEDICARE

## 2025-04-30 PROBLEM — M84.361A STRESS FRACTURE OF RIGHT TIBIA: Status: ACTIVE | Noted: 2025-04-30

## 2025-05-01 ENCOUNTER — OFFICE VISIT (OUTPATIENT)
Age: 75
End: 2025-05-01
Payer: MEDICARE

## 2025-05-01 ENCOUNTER — HOSPITAL ENCOUNTER (OUTPATIENT)
Facility: HOSPITAL | Age: 75
Discharge: HOME | End: 2025-05-01
Admitting: STUDENT IN AN ORGANIZED HEALTH CARE EDUCATION/TRAINING PROGRAM
Payer: MEDICARE

## 2025-05-01 VITALS
WEIGHT: 131.39 LBS | BODY MASS INDEX: 25.8 KG/M2 | SYSTOLIC BLOOD PRESSURE: 130 MMHG | DIASTOLIC BLOOD PRESSURE: 74 MMHG | HEIGHT: 60 IN

## 2025-05-01 DIAGNOSIS — M84.361A STRESS FRACTURE OF RIGHT TIBIA, INITIAL ENCOUNTER: Primary | ICD-10-CM

## 2025-05-01 DIAGNOSIS — M84.361A STRESS FRACTURE OF RIGHT TIBIA, INITIAL ENCOUNTER: ICD-10-CM

## 2025-05-01 PROCEDURE — 73718 MRI LOWER EXTREMITY W/O DYE: CPT

## 2025-05-01 NOTE — PROGRESS NOTES
[facility] [place] Up Visit     Office Follow Up      Date: 05/01/2025   Patient Name: Skye De Oliveira  MRN: [id number]  YOB: 1950    Referring Physician: Lydia Wilson DO     Chief Complaint:   Chief Complaint   Patient presents with    Follow-up     5.5 week follow up-  Stress fracture of right tibia (MRI 5/1/25)      History of Present Illness: Skye De Oliveira is a 74 y.o. female who returns to clinic today for follow up on right tibia pain. Her pain is a 2 /10 on the pain scale. Patient has tried the following previous treatments bracing  and anti-inflammatories. She mentions current symptoms of same as prior . She states that these treatments have Improved.    Subjective     Review of Systems: Review of Systems   Constitutional:  Negative for chills, fever, unexpected weight gain and unexpected weight loss.   HENT:  Negative for congestion, postnasal drip and rhinorrhea.    Eyes:  Negative for blurred vision.   Respiratory:  Negative for shortness of breath.    Cardiovascular:  Negative for leg swelling.   Gastrointestinal:  Negative for abdominal pain, nausea and vomiting.   Genitourinary:  Negative for difficulty urinating.   Musculoskeletal:  Positive for arthralgias. Negative for gait problem, joint swelling and myalgias.   Skin:  Negative for skin lesions and wound.   Neurological:  Negative for dizziness, weakness, light-headedness and numbness.   Hematological:  Does not bruise/bleed easily.   Psychiatric/Behavioral:  Negative for depressed mood.         Medications:   Current Outpatient Medications:     Allergy Relief 180 MG tablet, Take 1 tablet by mouth Daily., Disp: , Rfl:     Daridorexant HCl (Quviviq) 50 MG tablet, Take 1 tablet by mouth Every Night., Disp: 90 tablet, Rfl: 1    famotidine (PEPCID) 40 MG tablet, Take 1 tablet by mouth Daily., Disp: 90 tablet, Rfl: 3    melatonin 5 MG tablet tablet, Take 1 tablet by  "mouth At Night As Needed., Disp: , Rfl:     multivitamin (THERAGRAN) tablet tablet, Take 1 tablet by mouth Daily., Disp: , Rfl:     omeprazole (priLOSEC) 40 MG capsule, Take 1 capsule by mouth Daily., Disp: 90 capsule, Rfl: 3    sertraline (ZOLOFT) 100 MG tablet, Take 1 tablet by mouth Daily., Disp: 90 tablet, Rfl: 3    traMADol (ULTRAM) 50 MG tablet, Take 1 tablet by mouth 4 (Four) Times a Day As Needed for Moderate Pain., Disp: 120 tablet, Rfl: 5    valsartan (DIOVAN) 160 MG tablet, Take 1 tablet by mouth Daily., Disp: 90 tablet, Rfl: 1    vitamin B-12 (CYANOCOBALAMIN) 1000 MCG tablet, Take 1 tablet by mouth Daily., Disp: 90 tablet, Rfl: 1    vitamin D (ERGOCALCIFEROL) 1.25 MG (69830 UT) capsule capsule, TAKE 1 CAPSULE BY MOUTH 1 TIME EVERY WEEK, Disp: 7 capsule, Rfl: 0    Allergies:   Allergies   Allergen Reactions    Penicillins Hives     Tolerates cephalospins well         I have reviewed and updated the patient's chief complaint, history of present illness, review of systems, past medical history, surgical history, family history, social history, medications and allergy list as appropriate.     Objective      Vital Signs:   Vitals:    05/01/25 1252   BP: 130/74   Weight: 59.6 kg (131 lb 6.3 oz)   Height: 152.4 cm (60\")     Body mass index is 25.66 kg/m².  BMI is >= 25 and <30. (Overweight) The following options were offered after discussion;: exercise counseling/recommendations and nutrition counseling/recommendations     Patient reports that she is a non-smoker and has not ever been a smoker.  This behavior was applauded and she was encouraged to continue in smoking cessation.  We will continue to monitor at subsequent visits.      Ortho Exam:  Cardiovascular System: Arterial Pulses Right: dorsalis pedis normal and posterior tibialis normal. Arterial Pulses Left: posterior tibialis normal. Edema Right: none. Edema Left: none. Varicosities Right: capillary refill test normal. Varicosities Left: capillary " refill test normal.   Lower Legs: Inspection Right: no mass, induration, warmth, erythema, swelling, or ecchymosis. Bony Palpation Right: no tenderness of the proximal tibia, the distal tibia, the proximal fibula, the mid fibula, or the distal fibula and tenderness of the mid tibia medial (anterior). Bony Palpation Left: no tenderness of the proximal tibia, the mid tibia, the distal tibia, the proximal fibula, the mid fibula, or the distal fibula. Soft Tissue Palpation Right: no tenderness of the lateral tibial muscle compartment, the posterior tibial muscle compartment, the gastrocnemius, or the achilles tendon and tenderness of the anterior tibial muscle compartment. Soft Tissue Palpation Left: no tenderness of the anterior tibial muscle compartment, the lateral tibial muscle compartment, the posterior tibial muscle compartment, the gastrocnemius, or the achilles tendon. Active Range of Motion Right: knee flexion normal and extension normal and ankle dorsiflexion normal, plantar flexion normal, inversion normal, and eversion normal. Active Range of Motion Left: knee flexion normal and extension normal and ankle dorsiflexion normal, plantar flexion normal, inversion normal, and eversion normal. Stability Right: anterior drawer negative. Stability Left: anterior drawer negative. Right Lower Leg Strength extensor hallucis longus 5/5 and digitorum longus 5/5; peroneus longus 5/5 and brevis 5/5; knee flexion 5/5 and extension 5/5; and lateral rotation of flexed leg 5/5, medial rotation of flexed leg 5/5, tibialis anterior 5/5, posterior tibialis 5/5, and gastrocnemius 5/5. Left Lower Leg Strength extensor hallucis longus 5/5 and digitorum longus 5/5; peroneus longus 5/5 and brevis 5/5; knee flexion 5/5 and extension 5/5; and lateral rotation of flexed leg 5/5, medial rotation of flexed leg 5/5, tibialis anterior 5/5, posterior tibialis 5/5, and gastrocnemius 5/5.   Skin: Right Lower Extremity: normal. Left Lower  Extremity: normal.   Neurologic: Sensation on the Right: L2 normal, L3 normal, L4 normal, L5 normal, S1 normal, S2 normal, and S3,4,5 normal. Sensation on the Left: L2 normal, L3 normal, L4 normal, L5 normal, S1 normal, S2 normal, and S3,4,5 normal.    Results Review:   Imaging Results (Last 24 Hours)       ** No results found for the last 24 hours. **        MRI TIBIA FIBULA RIGHT WO CONTRAST     Date of Exam: 5/1/2025 10:07 AM EDT     Indication: tibia stress fracture.     Comparison: Right leg MRI 3/18/2025     Technique:  Routine multiplanar/multisequence images of the right tibia and fibula were obtained without contrast administration.          Findings:  Redemonstration of stress fracture of the mid-distal tibial shaft. There is a nondisplaced fracture line to the posterior cortex of the distal tibial shaft (series 7 image 24, series 3 image 15). There is patchy intramedullary edema which overall appears   improved compared to the prior MRI. There is minimal if any surrounding soft tissue edema, much improved compared to prior MRI. Unremarkable appearance of the muscles; no evidence of muscle strain. Unremarkable appearance of the contralateral left leg.     IMPRESSION:  Impression:  Redemonstration of stress fracture of the mid-distal tibial shaft. Intramedullary and surrounding soft tissue edema appears improved compared to the prior MRI. Fracture line remains visible.           Electronically Signed: Jose R Barakat MD    5/1/2025 11:19 AM EDT    Workstation ID: [id number]    Procedures    Assessment / Plan      Assessment/Plan:   Diagnoses and all orders for this visit:    1. Stress fracture of right tibia, initial encounter (Primary)    Plan:  MRI results show persistence of  and anterior tibial stress fracture.  There is improvement in the swelling and intramedullary and in the surrounding musculature.  Pain is also improved with limited weightbearing in cam boot and using crutches.  Growing frustrated by  the length of time to recover as well as activity that she is missing.  Continue in the cam boot with limited weightbearing.  Can advance as tolerated as discussed in the office today.  Pain should be the guide.    Continue vitamin D.  Follow-up in 1 month.    Follow Up:   Return in about 1 month (around 6/1/2025) for Recheck.      Justin Suarez MD  [facility]

## 2025-05-01 NOTE — ASSESSMENT & PLAN NOTE
Patient concerned as she has had several stress fractures in the last several months  Most recent stress fracture-right tibia  She is concerned about bone density related to stress fractures  Labs today and patient to follow-up with Dr. Wilson  Patient is to continue seeing Dr. Suarez, orthopedics  No change in treatment regimen at this time    Orders:    Comprehensive Metabolic Panel    PTH, Intact; Future    Phosphorus; Future

## 2025-05-19 ENCOUNTER — RESULTS FOLLOW-UP (OUTPATIENT)
Dept: FAMILY MEDICINE CLINIC | Facility: CLINIC | Age: 75
End: 2025-05-19
Payer: MEDICARE

## 2025-05-22 ENCOUNTER — OFFICE VISIT (OUTPATIENT)
Dept: FAMILY MEDICINE CLINIC | Facility: CLINIC | Age: 75
End: 2025-05-22
Payer: MEDICARE

## 2025-05-22 VITALS
HEART RATE: 80 BPM | DIASTOLIC BLOOD PRESSURE: 62 MMHG | WEIGHT: 132.1 LBS | HEIGHT: 60 IN | OXYGEN SATURATION: 99 % | BODY MASS INDEX: 25.93 KG/M2 | SYSTOLIC BLOOD PRESSURE: 124 MMHG

## 2025-05-22 DIAGNOSIS — M80.00XG AGE-RELATED OSTEOPOROSIS WITH CURRENT PATHOLOGICAL FRACTURE WITH DELAYED HEALING, SUBSEQUENT ENCOUNTER: Primary | ICD-10-CM

## 2025-05-22 DIAGNOSIS — I10 PRIMARY HYPERTENSION: ICD-10-CM

## 2025-05-22 RX ORDER — VALSARTAN 80 MG/1
80 TABLET ORAL 2 TIMES DAILY
Qty: 180 TABLET | Refills: 1 | Status: SHIPPED | OUTPATIENT
Start: 2025-05-22

## 2025-05-22 NOTE — PROGRESS NOTES
Office Note     Name: Skye De Oliveira    : 1950     MRN: [id number]     Chief Complaint  Results (Dexa ) and Hypertension    Subjective     History of Present Illness:  Skye De Oliveira is a 74 y.o. female who presents today for:    HTN  -cut valsartan in half and took BID and hypotension has stopped  -Denies chest pain, shortness of air, dizziness, vision changes, presyncope or syncope    Osteoporosis  -per chart review dx in 2018  -recent dental appointment (1 month ago) and no plans     Past Medical History:   Past Medical History:   Diagnosis Date    Abnormal EKG 2022    Adenomatous polyp of colon     Age-related osteoporosis without current pathological fracture     Ankle sprain     Anxiety     Breast injury 2024    lateral right breast injury    Chronic kidney disease     STAGE 2    Costochondral chest pain     Depression     DJD (degenerative joint disease)     MULTIPLE JOINTS    GERD without esophagitis     H/O mammogram 2016    NLM    High risk medication use     History of COVID-19 2021    no hospital no steroids    Hypokalemia     Knee swelling     Major depression in remission     Osteopenia 2016    Osteoporosis     PONV (postoperative nausea and vomiting)     Primary hypertension 2025    Primary insomnia     Primary osteoarthritis involving multiple joints     S/P bariatric surgery     Stress fracture     Left leg    Thyroid nodule     MULTIPLE    Vitamin D deficiency        Past Surgical History:   Past Surgical History:   Procedure Laterality Date    ABDOMINOPLASTY  2007    APPENDECTOMY      AUGMENTATION MAMMAPLASTY      BARIATRIC SURGERY      Gastric Byass    BLADDER REPAIR      [facility]    BREAST AUGMENTATION  2008    BREAST BIOPSY  2008    Both sides    BREAST SURGERY  2008    REMOVED IMPLANTS AND REMOVED MORE FIROIDS    CATARACT EXTRACTION Bilateral     CHOLECYSTECTOMY  2014    s/p lap elicia/ VLAD for partial SBO by Dr. Booker 14.     COLONOSCOPY  2017    ENDOSCOPY N/A 10/19/2022    Procedure: ESOPHAGOGASTRODUODENOSCOPY;  Surgeon: Ton Jurado MD;  Location:  [facility];  Service: General;  Laterality: N/A;    GASTRIC BYPASS  2014    with a 120 cm Minerva limb/ HHR by Dr. Shan Booker at [facility] 1/22/14    HAMMER TOE REPAIR      HIATAL HERNIA REPAIR N/A 10/19/2022    Procedure: HIATAL HERNIA REPAIR LAPAROSCOPIC;  Surgeon: Ton Jurado MD;  Location:  [facility];  Service: General;  Laterality: N/A;    HIP EXAMINATION UNDER ANESTHESIA Left     gluteus medius repair    HIP SURGERY  2022    Repair muscle    HYSTERECTOMY  1974    AND UILATERAL OOPHHORETOMY    KNEE SURGERY Left 2010    arthroscopy debridement    MASTOPEXY Bilateral 1982    WITH IMPLANT RECONSTRUCTION    NISSEN FUNDOPLICATION N/A 10/19/2022    Procedure: NISSEN FUNDOPLICATION LAPAROSCOPIC;  Surgeon: Ton Jurado MD;  Location:  [facility];  Service: General;  Laterality: N/A;    OVARY SURGERY Right 1998    REMOVED    REFRACTIVE SURGERY Bilateral     SHOULDER SURGERY Bilateral     ROTATOR CUFF  RIGHT 2000 LEFT 2001    TONSILLECTOMY  1974    ADENOIODECTOMY    VOCAL CORD BIOPSY  1992    POLYPS REMOVED       Immunizations:   Immunization History   Administered Date(s) Administered    COVID-19 (IGLESIA) 12/14/2021    COVID-19 (MODERNA) 1st,2nd,3rd Dose Monovalent 03/10/2021, 04/12/2021, 12/14/2021    COVID-19 (UNSPECIFIED) 12/22/2021    Flu Vaccine Quad PF >36MO 10/07/2019    Fluzone High-Dose 65+YRS 10/07/2019, 09/29/2020, 10/05/2021, 08/16/2024    Fluzone High-Dose 65+yrs 09/29/2020, 10/05/2021, 01/09/2023    Hep A / Hep B 06/29/2018, 05/31/2019, 11/25/2019    Hepatitis A 06/29/2018, 05/31/2019, 11/25/2019    Hepatitis B Adult/Adolescent IM 06/29/2018, 05/31/2019, 11/25/2019    INFLUENZA SPLIT TRI 09/15/2021    Influenza, Unspecified 08/15/2021, 10/05/2021    PEDS-Pneumococcal Conjugate (PCV7) 10/01/2021    Pneumococcal Conjugate  13-Valent (PCV13) 12/29/2017    Pneumococcal Polysaccharide (PPSV23) 10/09/2018, 10/01/2021    Shingrix 05/31/2019, 09/01/2019    Tdap 07/09/2015    Zoster, Unspecified 05/06/2015, 05/31/2019, 09/01/2019        Medications:     Current Outpatient Medications:     Allergy Relief 180 MG tablet, Take 1 tablet by mouth Daily., Disp: , Rfl:     Calcium Carbonate (CALCIUM 500 PO), Take 500 mg by mouth Daily., Disp: , Rfl:     Daridorexant HCl (Quviviq) 50 MG tablet, Take 1 tablet by mouth Every Night., Disp: 90 tablet, Rfl: 1    famotidine (PEPCID) 40 MG tablet, Take 1 tablet by mouth Daily., Disp: 90 tablet, Rfl: 3    melatonin 5 MG tablet tablet, Take 1 tablet by mouth At Night As Needed., Disp: , Rfl:     multivitamin (THERAGRAN) tablet tablet, Take 1 tablet by mouth Daily., Disp: , Rfl:     omeprazole (priLOSEC) 40 MG capsule, Take 1 capsule by mouth Daily., Disp: 90 capsule, Rfl: 3    sertraline (ZOLOFT) 100 MG tablet, Take 1 tablet by mouth Daily., Disp: 90 tablet, Rfl: 3    traMADol (ULTRAM) 50 MG tablet, Take 1 tablet by mouth 4 (Four) Times a Day As Needed for Moderate Pain., Disp: 120 tablet, Rfl: 5    valsartan (DIOVAN) 80 MG tablet, Take 1 tablet by mouth 2 (Two) Times a Day., Disp: 180 tablet, Rfl: 1    vitamin B-12 (CYANOCOBALAMIN) 1000 MCG tablet, Take 1 tablet by mouth Daily., Disp: 90 tablet, Rfl: 1    vitamin D (ERGOCALCIFEROL) 1.25 MG (39588 UT) capsule capsule, TAKE 1 CAPSULE BY MOUTH 1 TIME EVERY WEEK, Disp: 7 capsule, Rfl: 0    alendronate (FOSAMAX) 70 MG tablet, Take 1 tablet by mouth Every 7 (Seven) Days., Disp: 12 tablet, Rfl: 4    denosumab (PROLIA) 60 MG/ML solution prefilled syringe syringe, Inject 1 mL under the skin into the appropriate area as directed 1 (One) Time for 1 dose., Disp: 1 mL, Rfl: 0    Allergies:   Allergies   Allergen Reactions    Penicillins Hives     Tolerates cephalospins well         Family History:   Family History   Problem Relation Age of Onset    Lung cancer Mother      "Arthritis Mother     Cancer Mother         Lung cancer    Lung cancer Father 49    Cancer Father         Lung cancer    COPD Father     Mental illness Father         Schizophrenia    Diabetes Brother     Hearing loss Brother     Hypertension Brother     Cancer Brother         Prostrate cancer    Vision loss Brother         Macular Degeneration    Breast cancer Neg Hx     Ovarian cancer Neg Hx     Endometrial cancer Neg Hx        Social History:   Social History     Socioeconomic History    Marital status:    Tobacco Use    Smoking status: Never     Passive exposure: Never    Smokeless tobacco: Never   Vaping Use    Vaping status: Never Used   Substance and Sexual Activity    Alcohol use: Not Currently    Drug use: Never    Sexual activity: Not Currently     Partners: Male     Birth control/protection: Hysterectomy     Comment: no hormones         Objective     Vital Signs  /62 (BP Location: Right arm, Patient Position: Sitting, Cuff Size: Adult)   Pulse 80   Ht 152.4 cm (60\")   Wt 59.9 kg (132 lb 1.6 oz)   SpO2 99%   BMI 25.80 kg/m²   Estimated body mass index is 25.8 kg/m² as calculated from the following:    Height as of this encounter: 152.4 cm (60\").    Weight as of this encounter: 59.9 kg (132 lb 1.6 oz).            Physical Exam  Constitutional:       General: She is not in acute distress.     Appearance: Normal appearance.   HENT:      Head: Normocephalic and atraumatic.   Eyes:      Conjunctiva/sclera: Conjunctivae normal.   Cardiovascular:      Rate and Rhythm: Normal rate and regular rhythm.   Pulmonary:      Effort: Pulmonary effort is normal.      Breath sounds: Normal breath sounds.   Neurological:      Mental Status: She is alert.   Psychiatric:         Mood and Affect: Mood normal.         Behavior: Behavior normal.         Thought Content: Thought content normal.          Assessment and Plan     Diagnoses and all orders for this visit:    1. Age-related osteoporosis with current " pathological fracture with delayed healing, subsequent encounter (Primary)  -     denosumab (PROLIA) 60 MG/ML solution prefilled syringe syringe; Inject 1 mL under the skin into the appropriate area as directed 1 (One) Time for 1 dose.  Dispense: 1 mL; Refill: 0  -     Comprehensive metabolic panel; Future  -     Vitamin D,25-Hydroxy; Future    2. Primary hypertension  -     valsartan (DIOVAN) 80 MG tablet; Take 1 tablet by mouth 2 (Two) Times a Day.  Dispense: 180 tablet; Refill: 1    Today we discussed high blood pressure and she is doing well with a split dose of valsartan twice a day rather than once daily.    Also discussed diagnosis of osteoporosis.  She is agreeable to treatment.  In order to avoid possible GI side effects patient will pursue Prolia.  Greatly benefit from this medication given current pathological fracture with delayed healing secondary to osteoporosis diagnosis.  She does continue to follow with Ortho.         Follow Up  Return for bloodwork in 1 month.    DO DANNY Resendez [facility]  [address] [id number] [phone]    NOTE TO PATIENT: The 21st Century Cures Act makes medical notes like these available to patients in the interest of transparency. However, be advised this is a medical document. It is intended as peer to peer communication. It is written in medical language and may contain abbreviations or verbiage that are unfamiliar. It may appear blunt or direct. Medical documents are intended to carry relevant information, facts as evident, and the clinical opinion of the practitioner.

## 2025-05-27 ENCOUNTER — TELEPHONE (OUTPATIENT)
Dept: FAMILY MEDICINE CLINIC | Facility: CLINIC | Age: 75
End: 2025-05-27

## 2025-05-27 DIAGNOSIS — M80.80XA LOCALIZED OSTEOPOROSIS WITH CURRENT PATHOLOGICAL FRACTURE, INITIAL ENCOUNTER: Primary | ICD-10-CM

## 2025-05-27 NOTE — TELEPHONE ENCOUNTER
Caller: Skye De Oliveira    Relationship to patient: Self      Best call back number: [phone]     Provider: CIRILO PUGA DO      Medication PA needed: denosumab (PROLIA) 60 MG/ML solution prefilled syringe syringe    Reason for call/Prior Auth: MEDICATION NEEDS A PRIOR AUTHORIZATION FOR INSURANCE TO COVER

## 2025-05-29 ENCOUNTER — TELEPHONE (OUTPATIENT)
Dept: FAMILY MEDICINE CLINIC | Facility: CLINIC | Age: 75
End: 2025-05-29

## 2025-05-29 NOTE — TELEPHONE ENCOUNTER
Caller: Skye De Oliveira    Relationship: Self    Best call back number: [phone]    What is the best time to reach you: ANYTIME     Who are you requesting to speak with (clinical staff, provider,  specific staff member): CLINICAL STAFF     PATIENT STATES SHE WAS TOLD BY THE PHARMACY THAT PROLIA WAS DENIED BY INSURANCE. SHE IS WANTING TO KNOW WHAT ELSE CAN DO TO GET MEDICATION OR SWITCH IT. PLEASE CALL TO DISCUSS.

## 2025-05-30 NOTE — TELEPHONE ENCOUNTER
Caller: SHRAVAN    Relationship to patient: Other    Best call back number: [phone]     Patient is needing: SHRAVAN WITH THE PATIENT'S BILLING OFFICE CALLED TO CHECK ON THE STATUS OF THE PRIOR AUTHORIZATION FOR THE PROLIA PRESCRIPTION    PLEASE ADVISE

## 2025-05-30 NOTE — TELEPHONE ENCOUNTER
Caller: Skye De Oliveira    Relationship: Self    Best call back number:   Telephone Information:   Mobile [phone]       What was the call regarding: PATIENT CALLED IN TO CHECK ON THE STATUS OF HER PA PLEASE CALL HER BACK

## 2025-06-02 ENCOUNTER — OFFICE VISIT (OUTPATIENT)
Age: 75
End: 2025-06-02
Payer: MEDICARE

## 2025-06-02 VITALS — WEIGHT: 32 LBS | OXYGEN SATURATION: 99 % | HEART RATE: 75 BPM | HEIGHT: 60 IN | BODY MASS INDEX: 6.28 KG/M2

## 2025-06-02 DIAGNOSIS — M84.361A STRESS FRACTURE OF RIGHT TIBIA, INITIAL ENCOUNTER: Primary | ICD-10-CM

## 2025-06-02 DIAGNOSIS — M80.80XA LOCALIZED OSTEOPOROSIS WITH CURRENT PATHOLOGICAL FRACTURE, INITIAL ENCOUNTER: ICD-10-CM

## 2025-06-02 RX ORDER — ALENDRONATE SODIUM 70 MG/1
70 TABLET ORAL
Qty: 12 TABLET | Refills: 4 | Status: SHIPPED | OUTPATIENT
Start: 2025-06-02

## 2025-06-02 NOTE — TELEPHONE ENCOUNTER
Called pt, she is okay with doing the fosamax pill weekly until we can try to get prolia approved through her  insurance.

## 2025-06-02 NOTE — TELEPHONE ENCOUNTER
PA came back that Prolia is an excluded drug and not covered under plan.    Class II - visualization of the soft palate, fauces, and uvula

## 2025-06-02 NOTE — PROGRESS NOTES
[facility] Office Follow Up Visit     Office Follow Up      Date: 06/02/2025   Patient Name: Skye De Oliveira  MRN: [id number]  YOB: 1950    Referring Physician: No ref. provider found     Chief Complaint:   Chief Complaint   Patient presents with   • Follow-up     1 month follow up, . Stress fracture of right tibia, initial encounter       History of Present Illness: Skye De Oliveira is a 74 y.o. female who returns to clinic today for follow up on right tibia pain. Her pain is a 1 /10 on the pain scale. Patient has tried the following previous treatments bracing . She mentions current symptoms of pain . Her states that these treatments have Improved.      Subjective     Review of Systems: Review of Systems   Constitutional:  Negative for chills, fever, unexpected weight gain and unexpected weight loss.   HENT:  Negative for congestion, postnasal drip and rhinorrhea.    Eyes:  Negative for blurred vision.   Respiratory:  Negative for shortness of breath.    Cardiovascular:  Negative for leg swelling.   Gastrointestinal:  Negative for abdominal pain, nausea and vomiting.   Genitourinary:  Negative for difficulty urinating.   Musculoskeletal:  Positive for arthralgias. Negative for gait problem, joint swelling and myalgias.   Skin:  Negative for skin lesions and wound.   Neurological:  Negative for dizziness, weakness, light-headedness and numbness.   Hematological:  Does not bruise/bleed easily.   Psychiatric/Behavioral:  Negative for depressed mood.         Medications:   Current Outpatient Medications:   •  Allergy Relief 180 MG tablet, Take 1 tablet by mouth Daily., Disp: , Rfl:   •  Calcium Carbonate (CALCIUM 500 PO), Take 500 mg by mouth Daily., Disp: , Rfl:   •  Daridorexant HCl (Quviviq) 50 MG tablet, Take 1 tablet by mouth Every Night., Disp: 90 tablet, Rfl: 1  •  famotidine (PEPCID) 40 MG tablet, Take 1 tablet by mouth Daily., Disp: 90  "tablet, Rfl: 3  •  melatonin 5 MG tablet tablet, Take 1 tablet by mouth At Night As Needed., Disp: , Rfl:   •  multivitamin (THERAGRAN) tablet tablet, Take 1 tablet by mouth Daily., Disp: , Rfl:   •  omeprazole (priLOSEC) 40 MG capsule, Take 1 capsule by mouth Daily., Disp: 90 capsule, Rfl: 3  •  sertraline (ZOLOFT) 100 MG tablet, Take 1 tablet by mouth Daily., Disp: 90 tablet, Rfl: 3  •  traMADol (ULTRAM) 50 MG tablet, Take 1 tablet by mouth 4 (Four) Times a Day As Needed for Moderate Pain., Disp: 120 tablet, Rfl: 5  •  valsartan (DIOVAN) 80 MG tablet, Take 1 tablet by mouth 2 (Two) Times a Day., Disp: 180 tablet, Rfl: 1  •  vitamin B-12 (CYANOCOBALAMIN) 1000 MCG tablet, Take 1 tablet by mouth Daily., Disp: 90 tablet, Rfl: 1  •  vitamin D (ERGOCALCIFEROL) 1.25 MG (98926 UT) capsule capsule, TAKE 1 CAPSULE BY MOUTH 1 TIME EVERY WEEK, Disp: 7 capsule, Rfl: 0  •  denosumab (PROLIA) 60 MG/ML solution prefilled syringe syringe, Inject 1 mL under the skin into the appropriate area as directed 1 (One) Time for 1 dose., Disp: 1 mL, Rfl: 0    Allergies:   Allergies   Allergen Reactions   • Penicillins Hives     Tolerates cephalospins well         I have reviewed and updated the patient's chief complaint, history of present illness, review of systems, past medical history, surgical history, family history, social history, medications and allergy list as appropriate.     Objective      Vital Signs:   Vitals:    06/02/25 1519   Pulse: 75   SpO2: 99%   Weight: 14.5 kg (32 lb)   Height: 152.4 cm (60\")     Body mass index is 6.25 kg/m².  BMI is >= 25 and <30. (Overweight) The following options were offered after discussion;: exercise counseling/recommendations and nutrition counseling/recommendations     Patient reports that she is a non-smoker and has not ever been a smoker.  This behavior was applauded and she was encouraged to continue in smoking cessation.  We will continue to monitor at subsequent visits.    Ortho " Exam:  Right leg: no erythema ecchymosis swelling. Non tender to palpation over the anterior tibia. Full ROM in knee and ankle. SILT. 2+ posterior tibial pulse.    Results Review:   Imaging Results (Last 24 Hours)       ** No results found for the last 24 hours. **            Procedures    Assessment / Plan      Assessment/Plan:   Diagnoses and all orders for this visit:    1. Stress fracture of right tibia, initial encounter (Primary)  -     Ambulatory Referral to Physical Therapy for Evaluation & Treatment    2. Localized osteoporosis with current pathological fracture, initial encounter  -     Ambulatory Referral to Physical Therapy for Evaluation & Treatment    Plan:  Significant improvement in pain from last visit while in the walking boot and using crutches.  No significant tenderness on physical exam today.  Can wean from the boot.  Has DEXA scan confirms osteoporosis with pathologic fracture in both tibias, she needs treatment.  Discussed this with Dr. Wilson her primary care physician.  Will help with any appeal for Prolia.  Note provided to Keven for physical therapy.  Follow-up in 6 weeks.    Follow Up:   No follow-ups on file.      Justin Suarez MD  [facility]

## 2025-06-04 ENCOUNTER — TELEPHONE (OUTPATIENT)
Dept: FAMILY MEDICINE CLINIC | Facility: CLINIC | Age: 75
End: 2025-06-04

## 2025-06-04 DIAGNOSIS — M81.0 AGE-RELATED OSTEOPOROSIS WITHOUT CURRENT PATHOLOGICAL FRACTURE: ICD-10-CM

## 2025-06-04 NOTE — TELEPHONE ENCOUNTER
Caller: [address] [phone] [facility] [phone] [facility]    Relationship: Pharmacy        Requested Prescriptions:     denosumab (PROLIA) 60 MG/ML solution prefilled syringe syringe ()            Pharmacy where request should be sent: [address] [phone] [facility] [phone] [facility]     Last office visit with prescribing clinician: 2025   Last telemedicine visit with prescribing clinician: 2025   Next office visit with prescribing clinician: Visit date not found         Does the patient have less than a 3 day supply:  [x] Yes  [] No    Titus Davey Rep   25 13:14 EDT

## 2025-06-04 NOTE — TELEPHONE ENCOUNTER
Caller: Skye De Oliveira    Relationship: Self    Best call back number: [phone]     What is the medical concern/diagnosis: PROLIA INJECTIONS    What specialty or service is being requested: HEMATOLOGY/ONCOLOGY    What is the office phone number: [phone]    Any additional details: PATIENT CALLED TO ASK IF SHE COULD BE REFERRED TO HEMATOLOGY/ONCOLOGY IN [place], SINCE THEY SAID THAT THEY WOULD BE ABLE TO COVER HER PROLIA INJECTIONS THERE    PATIENT STATED THAT HER MEDICARE PART B WOULDN'T COVER THE PRESCRIPTION    PLEASE ADVISE

## 2025-06-05 NOTE — TELEPHONE ENCOUNTER
Called pt, she stated that she called the office and they stated they did the infusion. She didn't tell them what it was for. She states she will try the fosamax and see if that works.

## 2025-07-09 DIAGNOSIS — Z98.84 S/P BARIATRIC SURGERY: ICD-10-CM

## 2025-07-09 NOTE — TELEPHONE ENCOUNTER
Caller: Skye De Oliveira    Relationship: Self    Best call back number: [phone]     Requested Prescriptions:   Requested Prescriptions     Pending Prescriptions Disp Refills    omeprazole (priLOSEC) 40 MG capsule 90 capsule 3     Sig: Take 1 capsule by mouth Daily.        Pharmacy where request should be sent: [facility] [address] [phone] [facility] [phone]      Last office visit with prescribing clinician: 5/22/2025   Last telemedicine visit with prescribing clinician: 2/4/2025   Next office visit with prescribing clinician: Visit date not found     Does the patient have less than a 3 day supply:  [x] Yes  [] No    Would you like a call back once the refill request has been completed: [] Yes [x] No    If the office needs to give you a call back, can they leave a voicemail: [] Yes [x] No    Titus Aggarwal Rep   07/09/25 15:41 EDT

## 2025-07-10 RX ORDER — OMEPRAZOLE 40 MG/1
40 CAPSULE, DELAYED RELEASE ORAL DAILY
Qty: 90 CAPSULE | Refills: 3 | Status: SHIPPED | OUTPATIENT
Start: 2025-07-10

## 2025-07-11 NOTE — ASSESSMENT & PLAN NOTE
This ended up being a stress fracture that was seen on MRI.  She wore a boot for 6 monhts.  She is in PT.

## 2025-07-11 NOTE — ASSESSMENT & PLAN NOTE
* Medications/treatments/interventions tried include: Tylenol, Celebrex, meloxicam, Naproxen, shoulder surgery, she has seen an orthopaedic surgeon, she has done some physical therapy, Tramadol, Advil, she has seen a a hand surgeon (Dr. Chanel), she has had injections in her finger and thumbs, copper fit gloves, she has had knee injections (gel and cortisone)     1. Tylenol PRN is ok as directed  2. She has tried taking various oral NSAIDS   3. She has had gastric bypass surgery. Typically we try and limit/avoid oral NSAIDS in patients who have had gastric bypass   4. She has done some physical therapy   5. She has seen orthopaedic surgeons   6. She had shoulder surgery   7. Continue/refill Tramadol   8. We gave her a handout on osteoarthritis to take home and review   9. Follow up in 6 months   10.  She has seen a hand surgeon.    11. She has had injections in her fingers and thumbs.    12. She has tried wearing copper fit gloves   13. She has had knee injections (Gel and cortisone) with Dr. Umana every 3 months.  Pain is medial right knee only.  14. She has been diagnosed with osteoporosis and had 3 stress fractures, one in left lwoer leg and two in right lower leg.  15. She has a pending appointment at [facility]

## 2025-07-11 NOTE — ASSESSMENT & PLAN NOTE
* Tramadol 50 mg every 6 hours PRN For pain   * Controlled substance agreement was signed/updated as of 7/14/25  Check APOORVA and Drug screen as required.  Drug screen ordered today, 7/14/25  She is considering weaning off tramadol.  She can reduce by one tablet every 2 weeks.

## 2025-07-11 NOTE — PROGRESS NOTES
Office Follow Up      Date: 07/14/2025   Patient Name: Skye De Oliveira  MRN: [id number]  YOB: 1950    Referring Physician: No ref. provider found     Chief Complaint   Patient presents with    Primary osteoarthritis involving multiple joints       History of Present Illness: Skye De Oliveira is a 74 y.o. female who is here today for follow up.    We have prescribed her Tramadol. She needs this refilled.     Today she reports feeling worse.  She rates her pain and global scores 4.5/10 and has 10 minutes of morning stiffness.  She has been started on valsartan and Fosamax since we last saw her.  Her primary care tried to start her on Prolia but her insurance would not cover it.      Subjective     Review of Systems   HENT:  Positive for dental problem and tinnitus.         Dry mouth   Eyes:         Dry eyes   Gastrointestinal:  Positive for indigestion.   Musculoskeletal:  Positive for arthralgias and joint swelling.   Skin:  Positive for dry skin.   All other systems reviewed and are negative.         Current Outpatient Medications:     alendronate (FOSAMAX) 70 MG tablet, Take 1 tablet by mouth Every 7 (Seven) Days., Disp: 12 tablet, Rfl: 4    Allergy Relief 180 MG tablet, Take 1 tablet by mouth Daily., Disp: , Rfl:     Calcium Carbonate (CALCIUM 500 PO), Take 500 mg by mouth Daily., Disp: , Rfl:     Daridorexant HCl (Quviviq) 50 MG tablet, Take 1 tablet by mouth Every Night., Disp: 90 tablet, Rfl: 1    famotidine (PEPCID) 40 MG tablet, Take 1 tablet by mouth Daily., Disp: 90 tablet, Rfl: 3    melatonin 5 MG tablet tablet, Take 1 tablet by mouth At Night As Needed., Disp: , Rfl:     multivitamin (THERAGRAN) tablet tablet, Take 1 tablet by mouth Daily., Disp: , Rfl:     omeprazole (priLOSEC) 40 MG capsule, Take 1 capsule by mouth Daily., Disp: 90 capsule, Rfl: 3    sertraline (ZOLOFT) 100 MG tablet, Take 1 tablet by mouth Daily., Disp: 90 tablet, Rfl: 3    traMADol (ULTRAM) 50  "MG tablet, Take 1 tablet by mouth 4 (Four) Times a Day As Needed for Moderate Pain., Disp: 120 tablet, Rfl: 5    valsartan (DIOVAN) 80 MG tablet, Take 1 tablet by mouth 2 (Two) Times a Day., Disp: 180 tablet, Rfl: 1    vitamin B-12 (CYANOCOBALAMIN) 1000 MCG tablet, Take 1 tablet by mouth Daily., Disp: 90 tablet, Rfl: 1    vitamin D (ERGOCALCIFEROL) 1.25 MG (35805 UT) capsule capsule, TAKE 1 CAPSULE BY MOUTH 1 TIME EVERY WEEK, Disp: 7 capsule, Rfl: 0    Allergies   Allergen Reactions    Penicillins Hives     Tolerates cephalospins well         I have reviewed and updated the patient's chief complaint, history of present illness, review of systems, past medical history, surgical history, family history, social history, medications and allergy list as appropriate.     Objective      Vitals:    07/14/25 1315   BP: 130/76   BP Location: Left arm   Patient Position: Sitting   Cuff Size: Adult   Pulse: 67   Temp: 97.6 °F (36.4 °C)   Weight: 60.7 kg (133 lb 14.4 oz)   Height: 152.4 cm (60\")   PainSc: 5    PainLoc: Hand  Comment: knees       Body mass index is 26.15 kg/m².      Physical Exam     General: Well appearing 74 year old  male. Not in distress. He is ambulating unassisted.   SKIN: No rashes. No alopecia. No subcutaneous nodules. No digital pits or ulcers. No sclerodactyly.   HEENT: NCAT. Conjunctiva clear, no photophobia. No oral or nasal ulcers. Hearing intact.    Pulmonary: Clear to auscultation bilaterally. No wheezing, rales, or rhonchi.  CV: Regular rate and rhythm. No murmurs, rubs, or gallops.   Psych: Normal mood and affect. Alert and oriented x 3.   Extremities: No cyanosis or edema.   Musculoskeletal: Fingers are tender.  No warmth or erythema. Normal range of motion of the wrists, ankles, elbows, and knees. She has [place] and [place] nodes bilaterally. Knees have crepitus bilaterally.  Lymph: No palpable cervical adenopathy    Procedures    Assessment / Plan      Assessment & Plan  Primary " osteoarthritis involving multiple joints  * Medications/treatments/interventions tried include: Tylenol, Celebrex, meloxicam, Naproxen, shoulder surgery, she has seen an orthopaedic surgeon, she has done some physical therapy, Tramadol, Advil, she has seen a a hand surgeon (Dr. Chanel), she has had injections in her finger and thumbs, copper fit gloves, she has had knee injections (gel and cortisone)     1. Tylenol PRN is ok as directed  2. She has tried taking various oral NSAIDS   3. She has had gastric bypass surgery. Typically we try and limit/avoid oral NSAIDS in patients who have had gastric bypass   4. She has done some physical therapy   5. She has seen orthopaedic surgeons   6. She had shoulder surgery   7. Continue/refill Tramadol   8. We gave her a handout on osteoarthritis to take home and review   9. Follow up in 6 months   10.  She has seen a hand surgeon.    11. She has had injections in her fingers and thumbs.    12. She has tried wearing copper fit gloves   13. She has had knee injections (Gel and cortisone) with Dr. Umana every 3 months.  Pain is medial right knee only.  14. She has been diagnosed with osteoporosis and had 3 stress fractures, one in left lwoer leg and two in right lower leg.  15. She has a pending appointment at [facility]          Encounter for medication monitoring  * Tramadol 50 mg every 6 hours PRN For pain   * Controlled substance agreement was signed/updated as of 7/14/25  Check AP[place]OR[place] and Drug screen as required.  Drug screen ordered today, 7/14/25  She is considering weaning off tramadol.  She can reduce by one tablet every 2 weeks.      Left ankle pain, unspecified chronicity  This ended up being a stress fracture that was seen on MRI.  She wore a boot for 6 monhts.  She is in PT.          Follow Up:   Return in about 6 months (around 1/14/2026) for Dr. Quan.      HELEN Stubbs  [facility] [place]

## 2025-07-14 ENCOUNTER — OFFICE VISIT (OUTPATIENT)
Age: 75
End: 2025-07-14
Payer: MEDICARE

## 2025-07-14 VITALS
HEART RATE: 67 BPM | HEIGHT: 60 IN | SYSTOLIC BLOOD PRESSURE: 130 MMHG | BODY MASS INDEX: 26.29 KG/M2 | TEMPERATURE: 97.6 F | WEIGHT: 133.9 LBS | DIASTOLIC BLOOD PRESSURE: 76 MMHG

## 2025-07-14 DIAGNOSIS — Z51.81 ENCOUNTER FOR MEDICATION MONITORING: ICD-10-CM

## 2025-07-14 DIAGNOSIS — M15.0 PRIMARY OSTEOARTHRITIS INVOLVING MULTIPLE JOINTS: Primary | ICD-10-CM

## 2025-07-14 DIAGNOSIS — M25.572 LEFT ANKLE PAIN, UNSPECIFIED CHRONICITY: ICD-10-CM

## 2025-07-14 RX ORDER — TRAMADOL HYDROCHLORIDE 50 MG/1
50 TABLET ORAL 4 TIMES DAILY PRN
Qty: 120 TABLET | Refills: 5 | Status: SHIPPED | OUTPATIENT
Start: 2025-07-14

## 2025-07-15 ENCOUNTER — OFFICE VISIT (OUTPATIENT)
Dept: ORTHOPEDIC SURGERY | Facility: CLINIC | Age: 75
End: 2025-07-15
Payer: MEDICARE

## 2025-07-15 VITALS
HEIGHT: 60 IN | WEIGHT: 133 LBS | BODY MASS INDEX: 26.11 KG/M2 | DIASTOLIC BLOOD PRESSURE: 72 MMHG | SYSTOLIC BLOOD PRESSURE: 128 MMHG

## 2025-07-15 DIAGNOSIS — M84.361A STRESS FRACTURE OF RIGHT TIBIA, INITIAL ENCOUNTER: Primary | ICD-10-CM

## 2025-07-15 NOTE — PROGRESS NOTES
[facility] [place] [facility] Office Follow Up Visit     Office Follow Up      Date: 07/15/2025   Patient Name: Skye De Oliveira  MRN: [id number]  YOB: 1950    Referring Physician: No ref. provider found     Chief Complaint:   Chief Complaint   Patient presents with    Follow-up     6 wk f/u-Stress fracture of right tibia,       History of Present Illness: Skye De Oliveira is a 74 y.o. female who returns to clinic today for follow up on right tibia pain. Her pain is a 1 /10 on the pain scale. Patient has tried the following previous treatments physical therapy . She mentions current symptoms of same as prior . She states that these treatments have Improved.      Subjective     Review of Systems: Review of Systems   Constitutional:  Negative for chills, fever, unexpected weight gain and unexpected weight loss.   HENT:  Negative for congestion, postnasal drip and rhinorrhea.    Eyes:  Negative for blurred vision.   Respiratory:  Negative for shortness of breath.    Cardiovascular:  Negative for leg swelling.   Gastrointestinal:  Negative for abdominal pain, nausea and vomiting.   Genitourinary:  Negative for difficulty urinating.   Musculoskeletal:  Positive for arthralgias. Negative for gait problem, joint swelling and myalgias.   Skin:  Negative for skin lesions and wound.   Neurological:  Negative for dizziness, weakness, light-headedness and numbness.   Hematological:  Does not bruise/bleed easily.   Psychiatric/Behavioral:  Negative for depressed mood.         Medications:   Current Outpatient Medications:     alendronate (FOSAMAX) 70 MG tablet, Take 1 tablet by mouth Every 7 (Seven) Days., Disp: 12 tablet, Rfl: 4    Allergy Relief 180 MG tablet, Take 1 tablet by mouth Daily., Disp: , Rfl:     Calcium Carbonate (CALCIUM 500 PO), Take 500 mg by mouth Daily., Disp: , Rfl:     Daridorexant HCl (Quviviq) 50 MG tablet, Take 1 tablet by mouth Every Night., Disp: 90  "tablet, Rfl: 1    famotidine (PEPCID) 40 MG tablet, Take 1 tablet by mouth Daily., Disp: 90 tablet, Rfl: 3    melatonin 5 MG tablet tablet, Take 1 tablet by mouth At Night As Needed., Disp: , Rfl:     multivitamin (THERAGRAN) tablet tablet, Take 1 tablet by mouth Daily., Disp: , Rfl:     omeprazole (priLOSEC) 40 MG capsule, Take 1 capsule by mouth Daily., Disp: 90 capsule, Rfl: 3    sertraline (ZOLOFT) 100 MG tablet, Take 1 tablet by mouth Daily., Disp: 90 tablet, Rfl: 3    traMADol (ULTRAM) 50 MG tablet, Take 1 tablet by mouth 4 (Four) Times a Day As Needed for Moderate Pain., Disp: 120 tablet, Rfl: 5    valsartan (DIOVAN) 80 MG tablet, Take 1 tablet by mouth 2 (Two) Times a Day., Disp: 180 tablet, Rfl: 1    vitamin B-12 (CYANOCOBALAMIN) 1000 MCG tablet, Take 1 tablet by mouth Daily., Disp: 90 tablet, Rfl: 1    vitamin D (ERGOCALCIFEROL) 1.25 MG (26804 UT) capsule capsule, TAKE 1 CAPSULE BY MOUTH 1 TIME EVERY WEEK, Disp: 7 capsule, Rfl: 0    Allergies:   Allergies   Allergen Reactions    Penicillins Hives     Tolerates cephalospins well         I have reviewed and updated the patient's chief complaint, history of present illness, review of systems, past medical history, surgical history, family history, social history, medications and allergy list as appropriate.     Objective      Vital Signs:   Vitals:    07/15/25 1306   BP: 128/72   Weight: 60.3 kg (133 lb)   Height: 152.4 cm (60\")     Body mass index is 25.97 kg/m².  BMI is >= 25 and <30. (Overweight) The following options were offered after discussion;: exercise counseling/recommendations and nutrition counseling/recommendations     Patient reports that she is a non-smoker and has not ever been a smoker.  This behavior was applauded and she was encouraged to continue in smoking cessation.  We will continue to monitor at subsequent visits.     Ortho Exam:  Right leg: no erythema ecchymosis swelling. Non tender to palpation over the anterior tibia. Full ROM in " knee and ankle. SILT. 2+ posterior tibial pulse.    Results Review:   Imaging Results (Last 24 Hours)       ** No results found for the last 24 hours. **            Procedures    Assessment / Plan      Assessment/Plan:   Diagnoses and all orders for this visit:    1. Stress fracture of right tibia, initial encounter (Primary)    Plan:  Continued improvement in pain.  Able to play golf.  Complete physical therapy.  Activities as tolerated.  Follow-up as needed.    Follow Up:   Return if symptoms worsen or fail to improve.      Justin Suarez MD  [facility]

## 2025-07-16 LAB
1OH-MIDAZOLAM UR QL SCN: NOT DETECTED NG/MG CREAT
6MAM UR QL SCN: NEGATIVE NG/ML
7AMINOCLONAZEPAM/CREAT UR: NOT DETECTED NG/MG CREAT
A-OH ALPRAZ/CREAT UR: NOT DETECTED NG/MG CREAT
A-OH-TRIAZOLAM/CREAT UR CFM: NOT DETECTED NG/MG CREAT
ACP UR QL CFM: NOT DETECTED
ALPRAZ/CREAT UR CFM: NOT DETECTED NG/MG CREAT
AMPHETAMINES UR QL SCN: NEGATIVE NG/ML
APAP UR QL SCN: NEGATIVE UG/ML
BARBITURATES UR QL SCN: NEGATIVE NG/ML
BENZODIAZ SCN METH UR: NEGATIVE
BUPRENORPHINE UR QL SCN: NEGATIVE
BUPRENORPHINE/CREAT UR: NOT DETECTED NG/MG CREAT
CARISOPRODOL UR QL: NEGATIVE NG/ML
CLONAZEPAM/CREAT UR CFM: NOT DETECTED NG/MG CREAT
COCAINE+BZE UR QL SCN: NEGATIVE NG/ML
CREAT UR-MCNC: 22 MG/DL
D-METHORPHAN UR-MCNC: NOT DETECTED NG/ML
D-METHORPHAN+LEVORPHANOL UR QL: NOT DETECTED
DESALKYLFLURAZ/CREAT UR: NOT DETECTED NG/MG CREAT
DIAZEPAM/CREAT UR: NOT DETECTED NG/MG CREAT
ETHANOL UR QL SCN: NEGATIVE G/DL
ETHANOL UR QL SCN: NEGATIVE NG/ML
FENTANYL CTO UR SCN-MCNC: NEGATIVE NG/ML
FENTANYL/CREAT UR: NOT DETECTED NG/MG CREAT
FLUNITRAZEPAM UR QL SCN: NOT DETECTED NG/MG CREAT
GABAPENTIN UR-MCNC: NEGATIVE UG/ML
HALLUCINOGENS UR: NEGATIVE
HYPNOTICS UR QL SCN: NEGATIVE
KETAMINE UR QL: NOT DETECTED
LORAZEPAM/CREAT UR: NOT DETECTED NG/MG CREAT
MEPERIDINE UR QL SCN: NEGATIVE NG/ML
METHADONE UR QL SCN: NEGATIVE NG/ML
METHADONE+METAB UR QL SCN: NEGATIVE NG/ML
MIDAZOLAM/CREAT UR CFM: NOT DETECTED NG/MG CREAT
MISCELLANEOUS, UR: NEGATIVE
N-NORTRAMADOL/CREAT UR CFM: 741 NG/MG CREAT
NORBUPRENORPHINE/CREAT UR: NOT DETECTED NG/MG CREAT
NORDIAZEPAM/CREAT UR: NOT DETECTED NG/MG CREAT
NORFENTANYL/CREAT UR: NOT DETECTED NG/MG CREAT
NORFLUNITRAZEPAM UR-MCNC: NOT DETECTED NG/MG CREAT
NORKETAMINE UR-MCNC: NOT DETECTED UG/ML
O-NORTRAMADOL UR CFM-MCNC: 5850 NG/MG CREAT
OPIATES UR SCN-MCNC: NEGATIVE NG/ML
OXAZEPAM/CREAT UR: NOT DETECTED NG/MG CREAT
OXYCODONE CTO UR SCN-MCNC: NEGATIVE NG/ML
PCP UR QL SCN: NEGATIVE NG/ML
PRESCRIBED MEDICATIONS: NORMAL
PROPOXYPH UR QL SCN: NEGATIVE NG/ML
TAPENTADOL CTO UR SCN-MCNC: NEGATIVE NG/ML
TEMAZEPAM/CREAT UR: NOT DETECTED NG/MG CREAT
TRAMADOL UR CFM-MCNC: 4241 NG/MG CREAT
TRAMADOL UR QL CFM: NORMAL
TRAMADOL UR QL SCN: NORMAL NG/ML
ZALEPLON UR-MCNC: NOT DETECTED NG/ML
ZOLPIDEM PHENYL-4-CARB UR QL SCN: NOT DETECTED
ZOLPIDEM UR QL SCN: NOT DETECTED
ZOPICLONE-N-OXIDE UR-MCNC: NOT DETECTED NG/ML

## (undated) DEVICE — NDL HYPO ECLPS SFTY 22G 1 1/2IN

## (undated) DEVICE — [HIGH FLOW INSUFFLATOR,  DO NOT USE IF PACKAGE IS DAMAGED,  KEEP DRY,  KEEP AWAY FROM SUNLIGHT,  PROTECT FROM HEAT AND RADIOACTIVE SOURCES.]: Brand: PNEUMOSURE

## (undated) DEVICE — TROCAR: Brand: KII FIOS FIRST ENTRY

## (undated) DEVICE — ENDOPATH XCEL BLADELESS TROCARS WITH STABILITY SLEEVES: Brand: ENDOPATH XCEL

## (undated) DEVICE — SUT SILK 2/0 SH 30IN K833H

## (undated) DEVICE — GLV SURG SENSICARE PI MIC PF SZ9 LF STRL

## (undated) DEVICE — PATIENT RETURN ELECTRODE, SINGLE-USE, CONTACT QUALITY MONITORING, ADULT, WITH 9FT CORD, FOR PATIENTS WEIGING OVER 33LBS. (15KG): Brand: MEGADYNE

## (undated) DEVICE — ECHELON FLEX POWERED PLUS LONG ARTICULATING ENDOSCOPIC LINEAR CUTTER, 60MM: Brand: ECHELON FLEX

## (undated) DEVICE — LAPAROSCOPIC SMOKE FILTRATION SYSTEM: Brand: PALL LAPAROSHIELD® PLUS LAPAROSCOPIC SMOKE FILTRATION SYSTEM

## (undated) DEVICE — ADHS SKIN PREMIERPRO EXOFIN TOPICAL HI/VISC .5ML

## (undated) DEVICE — SYR CONTRL LUERLOK 10CC

## (undated) DEVICE — Device

## (undated) DEVICE — PK BARIATRIC 10

## (undated) DEVICE — GOWN,NON-REINFORCED,SIRUS,SET IN SLV,XXL: Brand: MEDLINE

## (undated) DEVICE — BLANKT WARM UPPR/BDY ARM/OUT 57X196CM

## (undated) DEVICE — ENDOPATH XCEL UNIVERSAL TROCAR STABLILITY SLEEVES: Brand: ENDOPATH XCEL

## (undated) DEVICE — PENROSE DRAIN 18 X .5" SILICONE: Brand: MEDLINE

## (undated) DEVICE — ENSEAL LAPAROSCOPIC TISSUE SEALER G2 ARTICULATING CURVED JAW FOR USE WITH G2 GENERATOR 5MM DIAMETER 45CM SHAFT LENGTH: Brand: ENSEAL

## (undated) DEVICE — GLV SURG SENSICARE PI MIC PF SZ8.5 LF STRL

## (undated) DEVICE — ANTIBACTERIAL VIOLET BRAIDED (POLYGLACTIN 910), SYNTHETIC ABSORBABLE SUTURE: Brand: COATED VICRYL